# Patient Record
Sex: FEMALE | HISPANIC OR LATINO | Employment: OTHER | URBAN - METROPOLITAN AREA
[De-identification: names, ages, dates, MRNs, and addresses within clinical notes are randomized per-mention and may not be internally consistent; named-entity substitution may affect disease eponyms.]

---

## 2017-03-15 ENCOUNTER — OFFICE VISIT - HEALTHEAST (OUTPATIENT)
Dept: FAMILY MEDICINE | Facility: CLINIC | Age: 82
End: 2017-03-15

## 2017-03-15 DIAGNOSIS — N39.0 URINARY TRACT INFECTION: ICD-10-CM

## 2017-03-15 DIAGNOSIS — S39.012A LUMBAR STRAIN: ICD-10-CM

## 2017-03-15 DIAGNOSIS — R35.0 FREQUENCY OF URINATION: ICD-10-CM

## 2017-03-17 ENCOUNTER — AMBULATORY - HEALTHEAST (OUTPATIENT)
Dept: FAMILY MEDICINE | Facility: CLINIC | Age: 82
End: 2017-03-17

## 2017-03-17 DIAGNOSIS — N39.0 UTI (URINARY TRACT INFECTION): ICD-10-CM

## 2017-04-12 ENCOUNTER — OFFICE VISIT - HEALTHEAST (OUTPATIENT)
Dept: FAMILY MEDICINE | Facility: CLINIC | Age: 82
End: 2017-04-12

## 2017-04-12 DIAGNOSIS — R23.3 PETECHIAL RASH: ICD-10-CM

## 2017-04-19 ENCOUNTER — OFFICE VISIT - HEALTHEAST (OUTPATIENT)
Dept: FAMILY MEDICINE | Facility: CLINIC | Age: 82
End: 2017-04-19

## 2017-04-19 DIAGNOSIS — M54.9 BACK PAIN: ICD-10-CM

## 2017-04-19 DIAGNOSIS — I87.8 VENOUS STASIS: ICD-10-CM

## 2017-04-19 DIAGNOSIS — D64.9 ANEMIA: ICD-10-CM

## 2017-04-19 DIAGNOSIS — I10 ESSENTIAL HYPERTENSION WITH GOAL BLOOD PRESSURE LESS THAN 140/90: ICD-10-CM

## 2017-04-19 ASSESSMENT — MIFFLIN-ST. JEOR: SCORE: 1076.59

## 2017-04-20 ENCOUNTER — COMMUNICATION - HEALTHEAST (OUTPATIENT)
Dept: FAMILY MEDICINE | Facility: CLINIC | Age: 82
End: 2017-04-20

## 2017-09-16 ENCOUNTER — COMMUNICATION - HEALTHEAST (OUTPATIENT)
Dept: FAMILY MEDICINE | Facility: CLINIC | Age: 82
End: 2017-09-16

## 2017-09-16 DIAGNOSIS — I10 HYPERTENSION: ICD-10-CM

## 2017-12-02 ENCOUNTER — COMMUNICATION - HEALTHEAST (OUTPATIENT)
Dept: FAMILY MEDICINE | Facility: CLINIC | Age: 82
End: 2017-12-02

## 2017-12-02 DIAGNOSIS — I10 ESSENTIAL HYPERTENSION: ICD-10-CM

## 2017-12-05 ENCOUNTER — COMMUNICATION - HEALTHEAST (OUTPATIENT)
Dept: FAMILY MEDICINE | Facility: CLINIC | Age: 82
End: 2017-12-05

## 2017-12-21 ENCOUNTER — OFFICE VISIT - HEALTHEAST (OUTPATIENT)
Dept: FAMILY MEDICINE | Facility: CLINIC | Age: 82
End: 2017-12-21

## 2017-12-21 DIAGNOSIS — R35.0 FREQUENCY OF URINATION: ICD-10-CM

## 2017-12-21 DIAGNOSIS — M25.512 SHOULDER PAIN, LEFT: ICD-10-CM

## 2017-12-21 DIAGNOSIS — M25.561 BILATERAL ANTERIOR KNEE PAIN: ICD-10-CM

## 2017-12-21 DIAGNOSIS — M25.562 BILATERAL ANTERIOR KNEE PAIN: ICD-10-CM

## 2017-12-21 DIAGNOSIS — M19.90 OSTEOARTHRITIS: ICD-10-CM

## 2017-12-21 ASSESSMENT — MIFFLIN-ST. JEOR: SCORE: 1062.98

## 2017-12-21 NOTE — ASSESSMENT & PLAN NOTE
Generalized but anterior knee pain worse today, referred for physical therapy.  Also physical therapy for post fracture shoulder pain (2012) left side

## 2017-12-28 ENCOUNTER — COMMUNICATION - HEALTHEAST (OUTPATIENT)
Dept: FAMILY MEDICINE | Facility: CLINIC | Age: 82
End: 2017-12-28

## 2018-01-08 ENCOUNTER — OFFICE VISIT - HEALTHEAST (OUTPATIENT)
Dept: PHYSICAL THERAPY | Facility: REHABILITATION | Age: 83
End: 2018-01-08

## 2018-01-08 DIAGNOSIS — M25.561 BILATERAL CHRONIC KNEE PAIN: ICD-10-CM

## 2018-01-08 DIAGNOSIS — G89.29 BILATERAL CHRONIC KNEE PAIN: ICD-10-CM

## 2018-01-08 DIAGNOSIS — M25.612 SHOULDER STIFFNESS, LEFT: ICD-10-CM

## 2018-01-08 DIAGNOSIS — G89.29 CHRONIC LEFT SHOULDER PAIN: ICD-10-CM

## 2018-01-08 DIAGNOSIS — M25.512 CHRONIC LEFT SHOULDER PAIN: ICD-10-CM

## 2018-01-08 DIAGNOSIS — M25.562 BILATERAL CHRONIC KNEE PAIN: ICD-10-CM

## 2018-01-08 DIAGNOSIS — R53.1 DECREASED STRENGTH: ICD-10-CM

## 2018-01-11 ENCOUNTER — OFFICE VISIT - HEALTHEAST (OUTPATIENT)
Dept: PHYSICAL THERAPY | Facility: REHABILITATION | Age: 83
End: 2018-01-11

## 2018-01-11 DIAGNOSIS — M25.561 BILATERAL CHRONIC KNEE PAIN: ICD-10-CM

## 2018-01-11 DIAGNOSIS — M25.612 SHOULDER STIFFNESS, LEFT: ICD-10-CM

## 2018-01-11 DIAGNOSIS — M25.512 CHRONIC LEFT SHOULDER PAIN: ICD-10-CM

## 2018-01-11 DIAGNOSIS — G89.29 CHRONIC LEFT SHOULDER PAIN: ICD-10-CM

## 2018-01-11 DIAGNOSIS — G89.29 BILATERAL CHRONIC KNEE PAIN: ICD-10-CM

## 2018-01-11 DIAGNOSIS — R53.1 DECREASED STRENGTH: ICD-10-CM

## 2018-01-11 DIAGNOSIS — M25.562 BILATERAL CHRONIC KNEE PAIN: ICD-10-CM

## 2018-01-16 ENCOUNTER — OFFICE VISIT - HEALTHEAST (OUTPATIENT)
Dept: PHYSICAL THERAPY | Facility: REHABILITATION | Age: 83
End: 2018-01-16

## 2018-01-16 DIAGNOSIS — G89.29 BILATERAL CHRONIC KNEE PAIN: ICD-10-CM

## 2018-01-16 DIAGNOSIS — R53.1 DECREASED STRENGTH: ICD-10-CM

## 2018-01-16 DIAGNOSIS — M25.512 CHRONIC LEFT SHOULDER PAIN: ICD-10-CM

## 2018-01-16 DIAGNOSIS — G89.29 CHRONIC LEFT SHOULDER PAIN: ICD-10-CM

## 2018-01-16 DIAGNOSIS — M25.562 BILATERAL CHRONIC KNEE PAIN: ICD-10-CM

## 2018-01-16 DIAGNOSIS — M25.561 BILATERAL CHRONIC KNEE PAIN: ICD-10-CM

## 2018-01-16 DIAGNOSIS — M25.612 SHOULDER STIFFNESS, LEFT: ICD-10-CM

## 2018-01-18 ENCOUNTER — OFFICE VISIT - HEALTHEAST (OUTPATIENT)
Dept: PHYSICAL THERAPY | Facility: REHABILITATION | Age: 83
End: 2018-01-18

## 2018-01-18 DIAGNOSIS — G89.29 CHRONIC LEFT SHOULDER PAIN: ICD-10-CM

## 2018-01-18 DIAGNOSIS — M25.612 SHOULDER STIFFNESS, LEFT: ICD-10-CM

## 2018-01-18 DIAGNOSIS — M25.561 BILATERAL CHRONIC KNEE PAIN: ICD-10-CM

## 2018-01-18 DIAGNOSIS — R53.1 DECREASED STRENGTH: ICD-10-CM

## 2018-01-18 DIAGNOSIS — G89.29 BILATERAL CHRONIC KNEE PAIN: ICD-10-CM

## 2018-01-18 DIAGNOSIS — M25.562 BILATERAL CHRONIC KNEE PAIN: ICD-10-CM

## 2018-01-18 DIAGNOSIS — M25.512 CHRONIC LEFT SHOULDER PAIN: ICD-10-CM

## 2018-01-22 ENCOUNTER — OFFICE VISIT - HEALTHEAST (OUTPATIENT)
Dept: PHYSICAL THERAPY | Facility: REHABILITATION | Age: 83
End: 2018-01-22

## 2018-01-22 DIAGNOSIS — M25.512 CHRONIC LEFT SHOULDER PAIN: ICD-10-CM

## 2018-01-22 DIAGNOSIS — M25.561 BILATERAL CHRONIC KNEE PAIN: ICD-10-CM

## 2018-01-22 DIAGNOSIS — G89.29 CHRONIC LEFT SHOULDER PAIN: ICD-10-CM

## 2018-01-22 DIAGNOSIS — R53.1 DECREASED STRENGTH: ICD-10-CM

## 2018-01-22 DIAGNOSIS — M25.562 BILATERAL CHRONIC KNEE PAIN: ICD-10-CM

## 2018-01-22 DIAGNOSIS — M25.612 SHOULDER STIFFNESS, LEFT: ICD-10-CM

## 2018-01-22 DIAGNOSIS — G89.29 BILATERAL CHRONIC KNEE PAIN: ICD-10-CM

## 2018-01-25 ENCOUNTER — OFFICE VISIT - HEALTHEAST (OUTPATIENT)
Dept: PHYSICAL THERAPY | Facility: REHABILITATION | Age: 83
End: 2018-01-25

## 2018-01-25 DIAGNOSIS — M25.562 BILATERAL CHRONIC KNEE PAIN: ICD-10-CM

## 2018-01-25 DIAGNOSIS — M25.512 CHRONIC LEFT SHOULDER PAIN: ICD-10-CM

## 2018-01-25 DIAGNOSIS — G89.29 BILATERAL CHRONIC KNEE PAIN: ICD-10-CM

## 2018-01-25 DIAGNOSIS — G89.29 CHRONIC LEFT SHOULDER PAIN: ICD-10-CM

## 2018-01-25 DIAGNOSIS — M25.561 BILATERAL CHRONIC KNEE PAIN: ICD-10-CM

## 2018-01-25 DIAGNOSIS — M25.612 SHOULDER STIFFNESS, LEFT: ICD-10-CM

## 2018-01-29 ENCOUNTER — OFFICE VISIT - HEALTHEAST (OUTPATIENT)
Dept: PHYSICAL THERAPY | Facility: REHABILITATION | Age: 83
End: 2018-01-29

## 2018-01-29 DIAGNOSIS — M25.512 CHRONIC LEFT SHOULDER PAIN: ICD-10-CM

## 2018-01-29 DIAGNOSIS — M25.562 BILATERAL CHRONIC KNEE PAIN: ICD-10-CM

## 2018-01-29 DIAGNOSIS — R53.1 DECREASED STRENGTH: ICD-10-CM

## 2018-01-29 DIAGNOSIS — M25.561 BILATERAL CHRONIC KNEE PAIN: ICD-10-CM

## 2018-01-29 DIAGNOSIS — G89.29 BILATERAL CHRONIC KNEE PAIN: ICD-10-CM

## 2018-01-29 DIAGNOSIS — G89.29 CHRONIC LEFT SHOULDER PAIN: ICD-10-CM

## 2018-01-29 DIAGNOSIS — M25.612 SHOULDER STIFFNESS, LEFT: ICD-10-CM

## 2018-02-05 ENCOUNTER — OFFICE VISIT - HEALTHEAST (OUTPATIENT)
Dept: PHYSICAL THERAPY | Facility: REHABILITATION | Age: 83
End: 2018-02-05

## 2018-02-05 DIAGNOSIS — M25.512 CHRONIC LEFT SHOULDER PAIN: ICD-10-CM

## 2018-02-05 DIAGNOSIS — G89.29 CHRONIC LEFT SHOULDER PAIN: ICD-10-CM

## 2018-02-05 DIAGNOSIS — M25.612 SHOULDER STIFFNESS, LEFT: ICD-10-CM

## 2018-02-05 DIAGNOSIS — M25.562 BILATERAL CHRONIC KNEE PAIN: ICD-10-CM

## 2018-02-05 DIAGNOSIS — M25.561 BILATERAL CHRONIC KNEE PAIN: ICD-10-CM

## 2018-02-05 DIAGNOSIS — R53.1 DECREASED STRENGTH: ICD-10-CM

## 2018-02-05 DIAGNOSIS — G89.29 BILATERAL CHRONIC KNEE PAIN: ICD-10-CM

## 2018-02-12 ENCOUNTER — OFFICE VISIT - HEALTHEAST (OUTPATIENT)
Dept: PHYSICAL THERAPY | Facility: REHABILITATION | Age: 83
End: 2018-02-12

## 2018-02-12 DIAGNOSIS — M25.512 CHRONIC LEFT SHOULDER PAIN: ICD-10-CM

## 2018-02-12 DIAGNOSIS — R53.1 DECREASED STRENGTH: ICD-10-CM

## 2018-02-12 DIAGNOSIS — M25.561 BILATERAL CHRONIC KNEE PAIN: ICD-10-CM

## 2018-02-12 DIAGNOSIS — G89.29 BILATERAL CHRONIC KNEE PAIN: ICD-10-CM

## 2018-02-12 DIAGNOSIS — M25.562 BILATERAL CHRONIC KNEE PAIN: ICD-10-CM

## 2018-02-12 DIAGNOSIS — G89.29 CHRONIC LEFT SHOULDER PAIN: ICD-10-CM

## 2018-02-12 DIAGNOSIS — M25.612 SHOULDER STIFFNESS, LEFT: ICD-10-CM

## 2018-03-07 ENCOUNTER — COMMUNICATION - HEALTHEAST (OUTPATIENT)
Dept: FAMILY MEDICINE | Facility: CLINIC | Age: 83
End: 2018-03-07

## 2018-03-07 DIAGNOSIS — I10 ESSENTIAL HYPERTENSION: ICD-10-CM

## 2018-03-16 ENCOUNTER — COMMUNICATION - HEALTHEAST (OUTPATIENT)
Dept: FAMILY MEDICINE | Facility: CLINIC | Age: 83
End: 2018-03-16

## 2018-03-16 DIAGNOSIS — I10 ESSENTIAL HYPERTENSION: ICD-10-CM

## 2018-08-08 ENCOUNTER — COMMUNICATION - HEALTHEAST (OUTPATIENT)
Dept: FAMILY MEDICINE | Facility: CLINIC | Age: 83
End: 2018-08-08

## 2018-08-08 DIAGNOSIS — I10 HYPERTENSION: ICD-10-CM

## 2018-08-10 ENCOUNTER — COMMUNICATION - HEALTHEAST (OUTPATIENT)
Dept: FAMILY MEDICINE | Facility: CLINIC | Age: 83
End: 2018-08-10

## 2018-08-10 ENCOUNTER — AMBULATORY - HEALTHEAST (OUTPATIENT)
Dept: FAMILY MEDICINE | Facility: CLINIC | Age: 83
End: 2018-08-10

## 2018-08-10 DIAGNOSIS — I10 ESSENTIAL HYPERTENSION: ICD-10-CM

## 2018-09-14 ENCOUNTER — COMMUNICATION - HEALTHEAST (OUTPATIENT)
Dept: FAMILY MEDICINE | Facility: CLINIC | Age: 83
End: 2018-09-14

## 2018-09-14 DIAGNOSIS — I10 ESSENTIAL HYPERTENSION: ICD-10-CM

## 2018-09-28 ENCOUNTER — COMMUNICATION - HEALTHEAST (OUTPATIENT)
Dept: SCHEDULING | Facility: CLINIC | Age: 83
End: 2018-09-28

## 2018-10-02 ENCOUNTER — OFFICE VISIT - HEALTHEAST (OUTPATIENT)
Dept: FAMILY MEDICINE | Facility: CLINIC | Age: 83
End: 2018-10-02

## 2018-10-02 DIAGNOSIS — Z00.00 HEALTHCARE MAINTENANCE: ICD-10-CM

## 2018-10-02 DIAGNOSIS — I10 ESSENTIAL HYPERTENSION: ICD-10-CM

## 2018-10-02 DIAGNOSIS — M17.12 PRIMARY OSTEOARTHRITIS OF LEFT KNEE: ICD-10-CM

## 2018-10-02 LAB
ANION GAP SERPL CALCULATED.3IONS-SCNC: 11 MMOL/L (ref 5–18)
BUN SERPL-MCNC: 28 MG/DL (ref 8–28)
CALCIUM SERPL-MCNC: 9.2 MG/DL (ref 8.5–10.5)
CHLORIDE BLD-SCNC: 103 MMOL/L (ref 98–107)
CO2 SERPL-SCNC: 26 MMOL/L (ref 22–31)
CREAT SERPL-MCNC: 1.24 MG/DL (ref 0.6–1.1)
GFR SERPL CREATININE-BSD FRML MDRD: 41 ML/MIN/1.73M2
GLUCOSE BLD-MCNC: 108 MG/DL (ref 70–125)
HGB BLD-MCNC: 12.3 G/DL (ref 12–16)
POTASSIUM BLD-SCNC: 3.8 MMOL/L (ref 3.5–5)
SODIUM SERPL-SCNC: 140 MMOL/L (ref 136–145)

## 2018-10-02 NOTE — ASSESSMENT & PLAN NOTE
Sudden increase in symptoms, left knee, likely due to degenerative tear of medial meniscus-noobstructive symptoms  Patient requested knee injection, using Tylenol    Knee Arthrocentesis  Injection Procedure Note    Diagnosis: left,  Knee osteoarthritis    Post-operative Diagnosis: same    Indications: pain      Procedure Details   Verbal consent was obtained for procedure.  Area was prepped with Betadine sterilely.  Landmarks were palpated and 27-gauge 1-1/4 needle advanced from medial approach under the patella into the joint space.  Medications then injected.  Needle removed.      Complications:  None; patient tolerated the procedure well.

## 2018-10-02 NOTE — ASSESSMENT & PLAN NOTE
Hypertension is overly well controlled-diastolic hypotension  BP Readings from Last 3 Encounters:   10/02/18 116/42   12/21/17 112/52   04/19/17 122/56     Comment: No symptoms of presyncope, somewhat bradycardic today  Current antihypertensives bisoprolol 1 mg, Hyzaar 100/25 and amlodipine 10 mg  Plan: Stop bisoprolol, follow-up 1 month, check BMP

## 2018-10-04 ENCOUNTER — COMMUNICATION - HEALTHEAST (OUTPATIENT)
Dept: FAMILY MEDICINE | Facility: CLINIC | Age: 83
End: 2018-10-04

## 2018-10-09 ENCOUNTER — OFFICE VISIT - HEALTHEAST (OUTPATIENT)
Dept: FAMILY MEDICINE | Facility: CLINIC | Age: 83
End: 2018-10-09

## 2018-10-09 DIAGNOSIS — I10 ESSENTIAL HYPERTENSION: ICD-10-CM

## 2018-10-09 DIAGNOSIS — M70.50 PES ANSERINE BURSITIS: ICD-10-CM

## 2018-10-09 NOTE — ASSESSMENT & PLAN NOTE
Hypertension is well controlled  BP Readings from Last 3 Encounters:   10/09/18    10/02/18 116/42   12/21/17 112/52     Comment: Stopped bisoprolol last visit  Current antihypertensives amlodipine 10, losartan/hydrochlorothiazide 100/25  Plan: No change  Follow-up: 6 months

## 2018-10-16 ENCOUNTER — OFFICE VISIT - HEALTHEAST (OUTPATIENT)
Dept: FAMILY MEDICINE | Facility: CLINIC | Age: 83
End: 2018-10-16

## 2018-10-16 DIAGNOSIS — M70.50 PES ANSERINE BURSITIS: ICD-10-CM

## 2018-10-16 NOTE — ASSESSMENT & PLAN NOTE
Superimposed on osteoarthritis of the left knee.  Pretty clear that she has distinct symptoms from her osteoarthritis    Discussed option of injection.  Discussed uncertainty, especially with knee effusion, of my diagnosis      Past anserine bursa injection left knee  Discussed risks and benefits of injection with patient including nerve and artery injury as well as infection  Patient agreed to risks/requested injection  Sterilely prepped wrist with alcohol  Located landmarks and palpated for area of maximal tenderness on medial knee  Using 27-gauge 1-1/4 needle injected 1 cc of lidocaine with 1/2 cc of 40 mg Depo-Medrol  Patient tolerated this well.    She had immediate relief of pain following this injection

## 2018-10-22 ENCOUNTER — RECORDS - HEALTHEAST (OUTPATIENT)
Dept: GENERAL RADIOLOGY | Facility: CLINIC | Age: 83
End: 2018-10-22

## 2018-10-22 ENCOUNTER — OFFICE VISIT - HEALTHEAST (OUTPATIENT)
Dept: FAMILY MEDICINE | Facility: CLINIC | Age: 83
End: 2018-10-22

## 2018-10-22 DIAGNOSIS — M25.562 PAIN IN LEFT KNEE: ICD-10-CM

## 2018-10-22 DIAGNOSIS — M25.562 ACUTE PAIN OF LEFT KNEE: ICD-10-CM

## 2018-10-22 DIAGNOSIS — G89.29 OTHER CHRONIC PAIN: ICD-10-CM

## 2018-10-31 ENCOUNTER — OFFICE VISIT - HEALTHEAST (OUTPATIENT)
Dept: PHYSICAL THERAPY | Facility: REHABILITATION | Age: 83
End: 2018-10-31

## 2018-10-31 DIAGNOSIS — R26.9 ABNORMALITY OF GAIT: ICD-10-CM

## 2018-10-31 DIAGNOSIS — M25.562 LEFT MEDIAL KNEE PAIN: ICD-10-CM

## 2018-11-06 ENCOUNTER — OFFICE VISIT - HEALTHEAST (OUTPATIENT)
Dept: PHYSICAL THERAPY | Facility: REHABILITATION | Age: 83
End: 2018-11-06

## 2018-11-06 DIAGNOSIS — R26.9 ABNORMALITY OF GAIT: ICD-10-CM

## 2018-11-06 DIAGNOSIS — M25.562 LEFT MEDIAL KNEE PAIN: ICD-10-CM

## 2018-11-13 ENCOUNTER — OFFICE VISIT - HEALTHEAST (OUTPATIENT)
Dept: PHYSICAL THERAPY | Facility: REHABILITATION | Age: 83
End: 2018-11-13

## 2018-11-13 DIAGNOSIS — R26.9 ABNORMALITY OF GAIT: ICD-10-CM

## 2018-11-13 DIAGNOSIS — M25.562 LEFT MEDIAL KNEE PAIN: ICD-10-CM

## 2018-11-20 ENCOUNTER — OFFICE VISIT - HEALTHEAST (OUTPATIENT)
Dept: PHYSICAL THERAPY | Facility: REHABILITATION | Age: 83
End: 2018-11-20

## 2018-11-20 DIAGNOSIS — M25.562 LEFT MEDIAL KNEE PAIN: ICD-10-CM

## 2018-11-20 DIAGNOSIS — R26.9 ABNORMALITY OF GAIT: ICD-10-CM

## 2018-11-27 ENCOUNTER — OFFICE VISIT - HEALTHEAST (OUTPATIENT)
Dept: PHYSICAL THERAPY | Facility: REHABILITATION | Age: 83
End: 2018-11-27

## 2018-11-27 DIAGNOSIS — R26.9 ABNORMALITY OF GAIT: ICD-10-CM

## 2018-11-27 DIAGNOSIS — M25.562 LEFT MEDIAL KNEE PAIN: ICD-10-CM

## 2019-02-28 ENCOUNTER — COMMUNICATION - HEALTHEAST (OUTPATIENT)
Dept: FAMILY MEDICINE | Facility: CLINIC | Age: 84
End: 2019-02-28

## 2019-02-28 DIAGNOSIS — I10 ESSENTIAL HYPERTENSION: ICD-10-CM

## 2019-05-30 ENCOUNTER — COMMUNICATION - HEALTHEAST (OUTPATIENT)
Dept: FAMILY MEDICINE | Facility: CLINIC | Age: 84
End: 2019-05-30

## 2019-05-30 DIAGNOSIS — I10 ESSENTIAL HYPERTENSION: ICD-10-CM

## 2019-08-29 ENCOUNTER — COMMUNICATION - HEALTHEAST (OUTPATIENT)
Dept: FAMILY MEDICINE | Facility: CLINIC | Age: 84
End: 2019-08-29

## 2019-08-29 DIAGNOSIS — I10 ESSENTIAL HYPERTENSION: ICD-10-CM

## 2019-11-27 ENCOUNTER — COMMUNICATION - HEALTHEAST (OUTPATIENT)
Dept: FAMILY MEDICINE | Facility: CLINIC | Age: 84
End: 2019-11-27

## 2019-11-27 DIAGNOSIS — I10 ESSENTIAL HYPERTENSION: ICD-10-CM

## 2019-11-29 ENCOUNTER — COMMUNICATION - HEALTHEAST (OUTPATIENT)
Dept: SCHEDULING | Facility: CLINIC | Age: 84
End: 2019-11-29

## 2019-11-29 DIAGNOSIS — I10 ESSENTIAL HYPERTENSION: ICD-10-CM

## 2020-02-11 ENCOUNTER — OFFICE VISIT - HEALTHEAST (OUTPATIENT)
Dept: FAMILY MEDICINE | Facility: CLINIC | Age: 85
End: 2020-02-11

## 2020-02-11 DIAGNOSIS — R22.32 NODULE OF FINGER OF LEFT HAND: ICD-10-CM

## 2020-02-11 ASSESSMENT — MIFFLIN-ST. JEOR: SCORE: 1027.83

## 2020-02-11 NOTE — ASSESSMENT & PLAN NOTE
Volar aspect, seems to pop out just proximal to the PIP joint.  Came up overnight  Unclear what this is  No warmth, redness to suggest infection but with pain this is clearly on the differential  Synovial cyst also distinct possibility but it seemed to arise very quickly for this problem and does not transilluminate  Hematoma or other fluid collection possibility as well    Discussed option of referral to hand surgery/ultrasound/empiric treatment for infection and follow    Decided on the latter, will soak, splint at night, elevate, cephalexin, follow-up if not improving.  Discussed to contact us promptly should things worsen.

## 2020-02-28 ENCOUNTER — COMMUNICATION - HEALTHEAST (OUTPATIENT)
Dept: FAMILY MEDICINE | Facility: CLINIC | Age: 85
End: 2020-02-28

## 2020-02-28 ENCOUNTER — AMBULATORY - HEALTHEAST (OUTPATIENT)
Dept: FAMILY MEDICINE | Facility: CLINIC | Age: 85
End: 2020-02-28

## 2020-02-28 DIAGNOSIS — R22.32 NODULE OF FINGER OF LEFT HAND: ICD-10-CM

## 2020-02-28 DIAGNOSIS — I10 ESSENTIAL HYPERTENSION: ICD-10-CM

## 2020-03-02 ENCOUNTER — RECORDS - HEALTHEAST (OUTPATIENT)
Dept: ADMINISTRATIVE | Facility: OTHER | Age: 85
End: 2020-03-02

## 2020-03-06 ENCOUNTER — OFFICE VISIT - HEALTHEAST (OUTPATIENT)
Dept: FAMILY MEDICINE | Facility: CLINIC | Age: 85
End: 2020-03-06

## 2020-03-06 DIAGNOSIS — R22.32 NODULE OF FINGER OF LEFT HAND: ICD-10-CM

## 2020-03-06 DIAGNOSIS — Z01.818 PREOP EXAMINATION: ICD-10-CM

## 2020-03-06 LAB
HGB BLD-MCNC: 12.3 G/DL (ref 12–16)
POTASSIUM BLD-SCNC: 3.6 MMOL/L (ref 3.5–5)

## 2020-03-06 ASSESSMENT — MIFFLIN-ST. JEOR: SCORE: 1013.93

## 2020-03-09 LAB
ATRIAL RATE - MUSE: 57 BPM
DIASTOLIC BLOOD PRESSURE - MUSE: NORMAL
INTERPRETATION ECG - MUSE: NORMAL
P AXIS - MUSE: 47 DEGREES
PR INTERVAL - MUSE: 166 MS
QRS DURATION - MUSE: 84 MS
QT - MUSE: 454 MS
QTC - MUSE: 441 MS
R AXIS - MUSE: -6 DEGREES
SYSTOLIC BLOOD PRESSURE - MUSE: NORMAL
T AXIS - MUSE: 36 DEGREES
VENTRICULAR RATE- MUSE: 57 BPM

## 2020-03-12 ENCOUNTER — RECORDS - HEALTHEAST (OUTPATIENT)
Dept: ADMINISTRATIVE | Facility: OTHER | Age: 85
End: 2020-03-12

## 2020-03-17 ENCOUNTER — RECORDS - HEALTHEAST (OUTPATIENT)
Dept: ADMINISTRATIVE | Facility: OTHER | Age: 85
End: 2020-03-17

## 2020-03-17 LAB
LAB AP CHARGES (HE HISTORICAL CONVERSION): NORMAL
PATH REPORT.COMMENTS IMP SPEC: NORMAL
PATH REPORT.COMMENTS IMP SPEC: NORMAL
PATH REPORT.FINAL DX SPEC: NORMAL
PATH REPORT.GROSS SPEC: NORMAL
PATH REPORT.MICROSCOPIC SPEC OTHER STN: NORMAL
PATH REPORT.MICROSCOPIC SPEC OTHER STN: NORMAL
PATH REPORT.RELEVANT HX SPEC: NORMAL
RESULT FLAG (HE HISTORICAL CONVERSION): NORMAL

## 2020-03-23 ENCOUNTER — RECORDS - HEALTHEAST (OUTPATIENT)
Dept: ADMINISTRATIVE | Facility: OTHER | Age: 85
End: 2020-03-23

## 2020-03-26 ENCOUNTER — COMMUNICATION - HEALTHEAST (OUTPATIENT)
Dept: FAMILY MEDICINE | Facility: CLINIC | Age: 85
End: 2020-03-26

## 2020-03-26 DIAGNOSIS — I10 ESSENTIAL HYPERTENSION: ICD-10-CM

## 2020-08-12 ENCOUNTER — COMMUNICATION - HEALTHEAST (OUTPATIENT)
Dept: FAMILY MEDICINE | Facility: CLINIC | Age: 85
End: 2020-08-12

## 2020-09-03 ENCOUNTER — OFFICE VISIT - HEALTHEAST (OUTPATIENT)
Dept: FAMILY MEDICINE | Facility: CLINIC | Age: 85
End: 2020-09-03

## 2020-09-03 DIAGNOSIS — Z23 ENCOUNTER FOR IMMUNIZATION: ICD-10-CM

## 2020-09-03 DIAGNOSIS — Z78.0 ASYMPTOMATIC MENOPAUSAL STATE: ICD-10-CM

## 2020-09-03 DIAGNOSIS — I10 ESSENTIAL HYPERTENSION: ICD-10-CM

## 2020-09-03 DIAGNOSIS — F43.21 GRIEF REACTION: ICD-10-CM

## 2020-09-03 DIAGNOSIS — Z00.00 HEALTHCARE MAINTENANCE: ICD-10-CM

## 2020-09-03 LAB
ANION GAP SERPL CALCULATED.3IONS-SCNC: 9 MMOL/L (ref 5–18)
BUN SERPL-MCNC: 17 MG/DL (ref 8–28)
CALCIUM SERPL-MCNC: 9.4 MG/DL (ref 8.5–10.5)
CHLORIDE BLD-SCNC: 104 MMOL/L (ref 98–107)
CO2 SERPL-SCNC: 25 MMOL/L (ref 22–31)
CREAT SERPL-MCNC: 0.84 MG/DL (ref 0.6–1.1)
GFR SERPL CREATININE-BSD FRML MDRD: >60 ML/MIN/1.73M2
GLUCOSE BLD-MCNC: 95 MG/DL (ref 70–125)
HGB BLD-MCNC: 12.4 G/DL (ref 12–16)
POTASSIUM BLD-SCNC: 3.4 MMOL/L (ref 3.5–5)
SODIUM SERPL-SCNC: 138 MMOL/L (ref 136–145)

## 2020-09-03 ASSESSMENT — PATIENT HEALTH QUESTIONNAIRE - PHQ9: SUM OF ALL RESPONSES TO PHQ QUESTIONS 1-9: 2

## 2020-09-03 ASSESSMENT — MIFFLIN-ST. JEOR: SCORE: 996.07

## 2020-09-03 NOTE — ASSESSMENT & PLAN NOTE
Amlodipine 10, losartan/hydrochlorthiazide 100/25, blood pressure well controlled.  No change, check BMP

## 2020-09-03 NOTE — ASSESSMENT & PLAN NOTE
3 weeks ago, helped place her  in a care facility for dementia since things were not manageable at home.  She is now very sad and lonely.  Tearful, not sleeping at night well, difficulty enjoying things during the day, not suicidal.    She has numerous social outlets, largely through her [employer], however she has not been reaching out to them.    Also, she has a son whois in town who she sees regularly but he is going to be going to [place] in October    Recommend therapist, referral made  Recommend continuing social activities even if she does not feel like it  Recommend physical activity    I was asked about possibility medication.  Decided against this at this early stage, this is normal grief response.  If problems persist or worsen it for the next 6 weeks, recommend that they follow-up.

## 2020-09-08 ENCOUNTER — COMMUNICATION - HEALTHEAST (OUTPATIENT)
Dept: NURSING | Facility: CLINIC | Age: 85
End: 2020-09-08

## 2020-09-08 ENCOUNTER — COMMUNICATION - HEALTHEAST (OUTPATIENT)
Dept: FAMILY MEDICINE | Facility: CLINIC | Age: 85
End: 2020-09-08

## 2020-09-08 ENCOUNTER — COMMUNICATION - HEALTHEAST (OUTPATIENT)
Dept: CARE COORDINATION | Facility: CLINIC | Age: 85
End: 2020-09-08

## 2020-09-08 ASSESSMENT — ACTIVITIES OF DAILY LIVING (ADL): DEPENDENT_IADLS:: INDEPENDENT

## 2020-09-11 ENCOUNTER — AMBULATORY - HEALTHEAST (OUTPATIENT)
Dept: CARE COORDINATION | Facility: CLINIC | Age: 85
End: 2020-09-11

## 2020-09-11 DIAGNOSIS — Z65.9 PSYCHOSOCIAL PROBLEM: ICD-10-CM

## 2020-09-22 ENCOUNTER — OFFICE VISIT - HEALTHEAST (OUTPATIENT)
Dept: BEHAVIORAL HEALTH | Facility: CLINIC | Age: 85
End: 2020-09-22

## 2020-09-22 DIAGNOSIS — F43.21 GRIEF REACTION: ICD-10-CM

## 2020-09-22 ASSESSMENT — ANXIETY QUESTIONNAIRES
7. FEELING AFRAID AS IF SOMETHING AWFUL MIGHT HAPPEN: NOT AT ALL
5. BEING SO RESTLESS THAT IT IS HARD TO SIT STILL: NOT AT ALL
GAD7 TOTAL SCORE: 7
2. NOT BEING ABLE TO STOP OR CONTROL WORRYING: NEARLY EVERY DAY
3. WORRYING TOO MUCH ABOUT DIFFERENT THINGS: NEARLY EVERY DAY
6. BECOMING EASILY ANNOYED OR IRRITABLE: NOT AT ALL
1. FEELING NERVOUS, ANXIOUS, OR ON EDGE: SEVERAL DAYS
IF YOU CHECKED OFF ANY PROBLEMS ON THIS QUESTIONNAIRE, HOW DIFFICULT HAVE THESE PROBLEMS MADE IT FOR YOU TO DO YOUR WORK, TAKE CARE OF THINGS AT HOME, OR GET ALONG WITH OTHER PEOPLE: SOMEWHAT DIFFICULT
4. TROUBLE RELAXING: NOT AT ALL

## 2020-09-22 ASSESSMENT — PATIENT HEALTH QUESTIONNAIRE - PHQ9: SUM OF ALL RESPONSES TO PHQ QUESTIONS 1-9: 1

## 2020-09-23 ENCOUNTER — COMMUNICATION - HEALTHEAST (OUTPATIENT)
Dept: NURSING | Facility: CLINIC | Age: 85
End: 2020-09-23

## 2020-09-25 ENCOUNTER — OFFICE VISIT - HEALTHEAST (OUTPATIENT)
Dept: FAMILY MEDICINE | Facility: CLINIC | Age: 85
End: 2020-09-25

## 2020-09-25 DIAGNOSIS — F43.21 ADJUSTMENT DISORDER WITH DEPRESSED MOOD: ICD-10-CM

## 2020-09-25 ASSESSMENT — MIFFLIN-ST. JEOR: SCORE: 991.54

## 2020-09-25 NOTE — ASSESSMENT & PLAN NOTE
placed in memory care facility 2 months ago, now lonely and sad.  Seeing therapist, staying active  Problems getting somewhat better, 2 nights per week, wakes up for 3 hours in the middle of the night and cannot sleep, otherwise better.    Decided against medication at this point, continue with therapist.  Follow-up to discuss 6 weeks

## 2020-10-01 ENCOUNTER — OFFICE VISIT - HEALTHEAST (OUTPATIENT)
Dept: BEHAVIORAL HEALTH | Facility: HOSPITAL | Age: 85
End: 2020-10-01

## 2020-10-01 DIAGNOSIS — F43.21 GRIEF REACTION: ICD-10-CM

## 2020-10-01 ASSESSMENT — ANXIETY QUESTIONNAIRES
3. WORRYING TOO MUCH ABOUT DIFFERENT THINGS: MORE THAN HALF THE DAYS
4. TROUBLE RELAXING: NOT AT ALL
1. FEELING NERVOUS, ANXIOUS, OR ON EDGE: NOT AT ALL
6. BECOMING EASILY ANNOYED OR IRRITABLE: MORE THAN HALF THE DAYS
7. FEELING AFRAID AS IF SOMETHING AWFUL MIGHT HAPPEN: MORE THAN HALF THE DAYS
5. BEING SO RESTLESS THAT IT IS HARD TO SIT STILL: MORE THAN HALF THE DAYS
IF YOU CHECKED OFF ANY PROBLEMS ON THIS QUESTIONNAIRE, HOW DIFFICULT HAVE THESE PROBLEMS MADE IT FOR YOU TO DO YOUR WORK, TAKE CARE OF THINGS AT HOME, OR GET ALONG WITH OTHER PEOPLE: SOMEWHAT DIFFICULT
2. NOT BEING ABLE TO STOP OR CONTROL WORRYING: MORE THAN HALF THE DAYS
GAD7 TOTAL SCORE: 10

## 2020-10-01 ASSESSMENT — PATIENT HEALTH QUESTIONNAIRE - PHQ9: SUM OF ALL RESPONSES TO PHQ QUESTIONS 1-9: 2

## 2020-10-07 ENCOUNTER — COMMUNICATION - HEALTHEAST (OUTPATIENT)
Dept: NURSING | Facility: CLINIC | Age: 85
End: 2020-10-07

## 2020-10-08 ENCOUNTER — OFFICE VISIT - HEALTHEAST (OUTPATIENT)
Dept: BEHAVIORAL HEALTH | Facility: HOSPITAL | Age: 85
End: 2020-10-08

## 2020-10-08 DIAGNOSIS — F43.22 ADJUSTMENT DISORDER WITH ANXIETY: ICD-10-CM

## 2020-10-08 DIAGNOSIS — F43.21 GRIEF REACTION: ICD-10-CM

## 2020-10-08 ASSESSMENT — ANXIETY QUESTIONNAIRES
7. FEELING AFRAID AS IF SOMETHING AWFUL MIGHT HAPPEN: NEARLY EVERY DAY
IF YOU CHECKED OFF ANY PROBLEMS ON THIS QUESTIONNAIRE, HOW DIFFICULT HAVE THESE PROBLEMS MADE IT FOR YOU TO DO YOUR WORK, TAKE CARE OF THINGS AT HOME, OR GET ALONG WITH OTHER PEOPLE: SOMEWHAT DIFFICULT
GAD7 TOTAL SCORE: 9
5. BEING SO RESTLESS THAT IT IS HARD TO SIT STILL: NOT AT ALL
2. NOT BEING ABLE TO STOP OR CONTROL WORRYING: NEARLY EVERY DAY
4. TROUBLE RELAXING: NOT AT ALL
6. BECOMING EASILY ANNOYED OR IRRITABLE: NOT AT ALL
1. FEELING NERVOUS, ANXIOUS, OR ON EDGE: NOT AT ALL
3. WORRYING TOO MUCH ABOUT DIFFERENT THINGS: NEARLY EVERY DAY

## 2020-10-08 ASSESSMENT — PATIENT HEALTH QUESTIONNAIRE - PHQ9: SUM OF ALL RESPONSES TO PHQ QUESTIONS 1-9: 0

## 2020-10-16 ENCOUNTER — COMMUNICATION - HEALTHEAST (OUTPATIENT)
Dept: CARE COORDINATION | Facility: CLINIC | Age: 85
End: 2020-10-16

## 2020-10-28 ENCOUNTER — OFFICE VISIT - HEALTHEAST (OUTPATIENT)
Dept: BEHAVIORAL HEALTH | Facility: HOSPITAL | Age: 85
End: 2020-10-28

## 2020-10-28 ENCOUNTER — AMBULATORY - HEALTHEAST (OUTPATIENT)
Dept: BEHAVIORAL HEALTH | Facility: HOSPITAL | Age: 85
End: 2020-10-28

## 2020-10-28 DIAGNOSIS — F43.22 ADJUSTMENT DISORDER WITH ANXIETY: ICD-10-CM

## 2020-10-28 DIAGNOSIS — F43.21 GRIEF REACTION: ICD-10-CM

## 2020-10-28 ASSESSMENT — ANXIETY QUESTIONNAIRES
6. BECOMING EASILY ANNOYED OR IRRITABLE: NEARLY EVERY DAY
4. TROUBLE RELAXING: NEARLY EVERY DAY
GAD7 TOTAL SCORE: 20
3. WORRYING TOO MUCH ABOUT DIFFERENT THINGS: NEARLY EVERY DAY
1. FEELING NERVOUS, ANXIOUS, OR ON EDGE: MORE THAN HALF THE DAYS
7. FEELING AFRAID AS IF SOMETHING AWFUL MIGHT HAPPEN: NEARLY EVERY DAY
IF YOU CHECKED OFF ANY PROBLEMS ON THIS QUESTIONNAIRE, HOW DIFFICULT HAVE THESE PROBLEMS MADE IT FOR YOU TO DO YOUR WORK, TAKE CARE OF THINGS AT HOME, OR GET ALONG WITH OTHER PEOPLE: EXTREMELY DIFFICULT
2. NOT BEING ABLE TO STOP OR CONTROL WORRYING: NEARLY EVERY DAY
5. BEING SO RESTLESS THAT IT IS HARD TO SIT STILL: NEARLY EVERY DAY

## 2020-10-28 ASSESSMENT — PATIENT HEALTH QUESTIONNAIRE - PHQ9: SUM OF ALL RESPONSES TO PHQ QUESTIONS 1-9: 11

## 2020-11-04 ENCOUNTER — OFFICE VISIT - HEALTHEAST (OUTPATIENT)
Dept: BEHAVIORAL HEALTH | Facility: HOSPITAL | Age: 85
End: 2020-11-04

## 2020-11-04 DIAGNOSIS — F43.22 ADJUSTMENT DISORDER WITH ANXIETY: ICD-10-CM

## 2020-11-04 DIAGNOSIS — F43.21 GRIEF REACTION: ICD-10-CM

## 2020-11-04 ASSESSMENT — ANXIETY QUESTIONNAIRES
GAD7 TOTAL SCORE: 3
2. NOT BEING ABLE TO STOP OR CONTROL WORRYING: SEVERAL DAYS
4. TROUBLE RELAXING: NOT AT ALL
7. FEELING AFRAID AS IF SOMETHING AWFUL MIGHT HAPPEN: NOT AT ALL
3. WORRYING TOO MUCH ABOUT DIFFERENT THINGS: SEVERAL DAYS
IF YOU CHECKED OFF ANY PROBLEMS ON THIS QUESTIONNAIRE, HOW DIFFICULT HAVE THESE PROBLEMS MADE IT FOR YOU TO DO YOUR WORK, TAKE CARE OF THINGS AT HOME, OR GET ALONG WITH OTHER PEOPLE: SOMEWHAT DIFFICULT
5. BEING SO RESTLESS THAT IT IS HARD TO SIT STILL: NOT AT ALL
6. BECOMING EASILY ANNOYED OR IRRITABLE: NOT AT ALL
1. FEELING NERVOUS, ANXIOUS, OR ON EDGE: SEVERAL DAYS

## 2020-11-04 ASSESSMENT — PATIENT HEALTH QUESTIONNAIRE - PHQ9: SUM OF ALL RESPONSES TO PHQ QUESTIONS 1-9: 0

## 2020-11-10 ENCOUNTER — COMMUNICATION - HEALTHEAST (OUTPATIENT)
Dept: NURSING | Facility: CLINIC | Age: 85
End: 2020-11-10

## 2020-11-18 ENCOUNTER — OFFICE VISIT - HEALTHEAST (OUTPATIENT)
Dept: BEHAVIORAL HEALTH | Facility: HOSPITAL | Age: 85
End: 2020-11-18

## 2020-11-18 DIAGNOSIS — F43.22 ADJUSTMENT DISORDER WITH ANXIETY: ICD-10-CM

## 2020-11-18 DIAGNOSIS — F43.21 GRIEF REACTION: ICD-10-CM

## 2020-11-18 ASSESSMENT — PATIENT HEALTH QUESTIONNAIRE - PHQ9: SUM OF ALL RESPONSES TO PHQ QUESTIONS 1-9: 0

## 2020-11-18 ASSESSMENT — ANXIETY QUESTIONNAIRES
6. BECOMING EASILY ANNOYED OR IRRITABLE: NOT AT ALL
2. NOT BEING ABLE TO STOP OR CONTROL WORRYING: MORE THAN HALF THE DAYS
4. TROUBLE RELAXING: NOT AT ALL
3. WORRYING TOO MUCH ABOUT DIFFERENT THINGS: SEVERAL DAYS
5. BEING SO RESTLESS THAT IT IS HARD TO SIT STILL: NOT AT ALL
1. FEELING NERVOUS, ANXIOUS, OR ON EDGE: MORE THAN HALF THE DAYS
7. FEELING AFRAID AS IF SOMETHING AWFUL MIGHT HAPPEN: NOT AT ALL
GAD7 TOTAL SCORE: 5
IF YOU CHECKED OFF ANY PROBLEMS ON THIS QUESTIONNAIRE, HOW DIFFICULT HAVE THESE PROBLEMS MADE IT FOR YOU TO DO YOUR WORK, TAKE CARE OF THINGS AT HOME, OR GET ALONG WITH OTHER PEOPLE: SOMEWHAT DIFFICULT

## 2020-11-23 ENCOUNTER — COMMUNICATION - HEALTHEAST (OUTPATIENT)
Dept: NURSING | Facility: CLINIC | Age: 85
End: 2020-11-23

## 2020-11-25 ENCOUNTER — OFFICE VISIT - HEALTHEAST (OUTPATIENT)
Dept: FAMILY MEDICINE | Facility: CLINIC | Age: 85
End: 2020-11-25

## 2020-11-25 DIAGNOSIS — F43.21 GRIEF REACTION: ICD-10-CM

## 2020-11-25 DIAGNOSIS — M75.81 ROTATOR CUFF TENDONITIS, RIGHT: ICD-10-CM

## 2020-11-25 ASSESSMENT — MIFFLIN-ST. JEOR: SCORE: 1014.22

## 2020-11-25 NOTE — ASSESSMENT & PLAN NOTE
Recurrent problem, x-ray back in 2012 was negative.  Recommend physical therapy, she agreed.  Discussed option of injection, declinedat this point and I concur.  We will see back if needed.

## 2020-11-30 ENCOUNTER — COMMUNICATION - HEALTHEAST (OUTPATIENT)
Dept: CARE COORDINATION | Facility: CLINIC | Age: 85
End: 2020-11-30

## 2020-12-09 ENCOUNTER — AMBULATORY - HEALTHEAST (OUTPATIENT)
Dept: BEHAVIORAL HEALTH | Facility: HOSPITAL | Age: 85
End: 2020-12-09

## 2020-12-17 ENCOUNTER — COMMUNICATION - HEALTHEAST (OUTPATIENT)
Dept: NURSING | Facility: CLINIC | Age: 85
End: 2020-12-17

## 2021-01-06 ENCOUNTER — OFFICE VISIT - HEALTHEAST (OUTPATIENT)
Dept: BEHAVIORAL HEALTH | Facility: HOSPITAL | Age: 86
End: 2021-01-06

## 2021-01-06 ENCOUNTER — COMMUNICATION - HEALTHEAST (OUTPATIENT)
Dept: FAMILY MEDICINE | Facility: CLINIC | Age: 86
End: 2021-01-06

## 2021-01-06 DIAGNOSIS — F43.22 ADJUSTMENT DISORDER WITH ANXIETY: ICD-10-CM

## 2021-01-06 DIAGNOSIS — F43.21 GRIEF REACTION: ICD-10-CM

## 2021-01-06 DIAGNOSIS — I10 ESSENTIAL HYPERTENSION: ICD-10-CM

## 2021-01-06 RX ORDER — HYDROCHLOROTHIAZIDE 25 MG/1
25 TABLET ORAL DAILY
Qty: 30 TABLET | Refills: 11 | Status: SHIPPED | OUTPATIENT
Start: 2021-01-06 | End: 2022-01-13

## 2021-01-06 ASSESSMENT — ANXIETY QUESTIONNAIRES
GAD7 TOTAL SCORE: 2
IF YOU CHECKED OFF ANY PROBLEMS ON THIS QUESTIONNAIRE, HOW DIFFICULT HAVE THESE PROBLEMS MADE IT FOR YOU TO DO YOUR WORK, TAKE CARE OF THINGS AT HOME, OR GET ALONG WITH OTHER PEOPLE: NOT DIFFICULT AT ALL
4. TROUBLE RELAXING: NOT AT ALL
2. NOT BEING ABLE TO STOP OR CONTROL WORRYING: NOT AT ALL
7. FEELING AFRAID AS IF SOMETHING AWFUL MIGHT HAPPEN: NOT AT ALL
1. FEELING NERVOUS, ANXIOUS, OR ON EDGE: SEVERAL DAYS
6. BECOMING EASILY ANNOYED OR IRRITABLE: SEVERAL DAYS
5. BEING SO RESTLESS THAT IT IS HARD TO SIT STILL: NOT AT ALL
3. WORRYING TOO MUCH ABOUT DIFFERENT THINGS: NOT AT ALL

## 2021-01-06 ASSESSMENT — PATIENT HEALTH QUESTIONNAIRE - PHQ9: SUM OF ALL RESPONSES TO PHQ QUESTIONS 1-9: 0

## 2021-01-13 ENCOUNTER — OFFICE VISIT - HEALTHEAST (OUTPATIENT)
Dept: BEHAVIORAL HEALTH | Facility: HOSPITAL | Age: 86
End: 2021-01-13

## 2021-01-13 DIAGNOSIS — F43.22 ADJUSTMENT DISORDER WITH ANXIETY: ICD-10-CM

## 2021-01-13 DIAGNOSIS — F43.21 GRIEF REACTION: ICD-10-CM

## 2021-01-13 ASSESSMENT — ANXIETY QUESTIONNAIRES
2. NOT BEING ABLE TO STOP OR CONTROL WORRYING: NOT AT ALL
3. WORRYING TOO MUCH ABOUT DIFFERENT THINGS: NOT AT ALL
4. TROUBLE RELAXING: NOT AT ALL
7. FEELING AFRAID AS IF SOMETHING AWFUL MIGHT HAPPEN: NOT AT ALL
6. BECOMING EASILY ANNOYED OR IRRITABLE: NOT AT ALL
5. BEING SO RESTLESS THAT IT IS HARD TO SIT STILL: NOT AT ALL
1. FEELING NERVOUS, ANXIOUS, OR ON EDGE: NOT AT ALL
IF YOU CHECKED OFF ANY PROBLEMS ON THIS QUESTIONNAIRE, HOW DIFFICULT HAVE THESE PROBLEMS MADE IT FOR YOU TO DO YOUR WORK, TAKE CARE OF THINGS AT HOME, OR GET ALONG WITH OTHER PEOPLE: NOT DIFFICULT AT ALL
GAD7 TOTAL SCORE: 0

## 2021-01-13 ASSESSMENT — PATIENT HEALTH QUESTIONNAIRE - PHQ9: SUM OF ALL RESPONSES TO PHQ QUESTIONS 1-9: 0

## 2021-01-20 ENCOUNTER — OFFICE VISIT - HEALTHEAST (OUTPATIENT)
Dept: BEHAVIORAL HEALTH | Facility: HOSPITAL | Age: 86
End: 2021-01-20

## 2021-01-20 ENCOUNTER — COMMUNICATION - HEALTHEAST (OUTPATIENT)
Dept: NURSING | Facility: CLINIC | Age: 86
End: 2021-01-20

## 2021-01-20 DIAGNOSIS — F43.21 GRIEF REACTION: ICD-10-CM

## 2021-01-20 DIAGNOSIS — F43.22 ADJUSTMENT DISORDER WITH ANXIETY: ICD-10-CM

## 2021-01-20 ASSESSMENT — PATIENT HEALTH QUESTIONNAIRE - PHQ9: SUM OF ALL RESPONSES TO PHQ QUESTIONS 1-9: 0

## 2021-01-20 ASSESSMENT — ANXIETY QUESTIONNAIRES
5. BEING SO RESTLESS THAT IT IS HARD TO SIT STILL: NOT AT ALL
2. NOT BEING ABLE TO STOP OR CONTROL WORRYING: NOT AT ALL
7. FEELING AFRAID AS IF SOMETHING AWFUL MIGHT HAPPEN: NOT AT ALL
3. WORRYING TOO MUCH ABOUT DIFFERENT THINGS: NOT AT ALL
1. FEELING NERVOUS, ANXIOUS, OR ON EDGE: NOT AT ALL
4. TROUBLE RELAXING: NOT AT ALL
IF YOU CHECKED OFF ANY PROBLEMS ON THIS QUESTIONNAIRE, HOW DIFFICULT HAVE THESE PROBLEMS MADE IT FOR YOU TO DO YOUR WORK, TAKE CARE OF THINGS AT HOME, OR GET ALONG WITH OTHER PEOPLE: NOT DIFFICULT AT ALL
GAD7 TOTAL SCORE: 0
6. BECOMING EASILY ANNOYED OR IRRITABLE: NOT AT ALL

## 2021-01-25 ENCOUNTER — COMMUNICATION - HEALTHEAST (OUTPATIENT)
Dept: NURSING | Facility: CLINIC | Age: 86
End: 2021-01-25

## 2021-01-27 ENCOUNTER — COMMUNICATION - HEALTHEAST (OUTPATIENT)
Dept: CARE COORDINATION | Facility: CLINIC | Age: 86
End: 2021-01-27

## 2021-01-27 ENCOUNTER — OFFICE VISIT - HEALTHEAST (OUTPATIENT)
Dept: BEHAVIORAL HEALTH | Facility: HOSPITAL | Age: 86
End: 2021-01-27

## 2021-01-27 DIAGNOSIS — F43.22 ADJUSTMENT DISORDER WITH ANXIETY: ICD-10-CM

## 2021-01-27 DIAGNOSIS — F43.21 GRIEF REACTION: ICD-10-CM

## 2021-01-27 ASSESSMENT — ANXIETY QUESTIONNAIRES
GAD7 TOTAL SCORE: 0
3. WORRYING TOO MUCH ABOUT DIFFERENT THINGS: NOT AT ALL
5. BEING SO RESTLESS THAT IT IS HARD TO SIT STILL: NOT AT ALL
6. BECOMING EASILY ANNOYED OR IRRITABLE: NOT AT ALL
4. TROUBLE RELAXING: NOT AT ALL
IF YOU CHECKED OFF ANY PROBLEMS ON THIS QUESTIONNAIRE, HOW DIFFICULT HAVE THESE PROBLEMS MADE IT FOR YOU TO DO YOUR WORK, TAKE CARE OF THINGS AT HOME, OR GET ALONG WITH OTHER PEOPLE: NOT DIFFICULT AT ALL
2. NOT BEING ABLE TO STOP OR CONTROL WORRYING: NOT AT ALL
7. FEELING AFRAID AS IF SOMETHING AWFUL MIGHT HAPPEN: NOT AT ALL
1. FEELING NERVOUS, ANXIOUS, OR ON EDGE: NOT AT ALL

## 2021-01-27 ASSESSMENT — PATIENT HEALTH QUESTIONNAIRE - PHQ9: SUM OF ALL RESPONSES TO PHQ QUESTIONS 1-9: 0

## 2021-02-03 ENCOUNTER — COMMUNICATION - HEALTHEAST (OUTPATIENT)
Dept: FAMILY MEDICINE | Facility: CLINIC | Age: 86
End: 2021-02-03

## 2021-02-03 DIAGNOSIS — I10 ESSENTIAL HYPERTENSION: ICD-10-CM

## 2021-02-03 RX ORDER — LOSARTAN POTASSIUM 100 MG/1
100 TABLET ORAL DAILY
Qty: 90 TABLET | Refills: 2 | Status: SHIPPED | OUTPATIENT
Start: 2021-02-03 | End: 2022-01-13

## 2021-02-04 ENCOUNTER — OFFICE VISIT - HEALTHEAST (OUTPATIENT)
Dept: BEHAVIORAL HEALTH | Facility: HOSPITAL | Age: 86
End: 2021-02-04

## 2021-02-04 ENCOUNTER — AMBULATORY - HEALTHEAST (OUTPATIENT)
Dept: BEHAVIORAL HEALTH | Facility: HOSPITAL | Age: 86
End: 2021-02-04

## 2021-02-04 DIAGNOSIS — F43.22 ADJUSTMENT DISORDER WITH ANXIETY: ICD-10-CM

## 2021-02-04 DIAGNOSIS — F43.21 GRIEF REACTION: ICD-10-CM

## 2021-02-04 ASSESSMENT — PATIENT HEALTH QUESTIONNAIRE - PHQ9: SUM OF ALL RESPONSES TO PHQ QUESTIONS 1-9: 0

## 2021-02-04 ASSESSMENT — ANXIETY QUESTIONNAIRES
3. WORRYING TOO MUCH ABOUT DIFFERENT THINGS: NOT AT ALL
5. BEING SO RESTLESS THAT IT IS HARD TO SIT STILL: NOT AT ALL
GAD7 TOTAL SCORE: 0
4. TROUBLE RELAXING: NOT AT ALL
7. FEELING AFRAID AS IF SOMETHING AWFUL MIGHT HAPPEN: NOT AT ALL
IF YOU CHECKED OFF ANY PROBLEMS ON THIS QUESTIONNAIRE, HOW DIFFICULT HAVE THESE PROBLEMS MADE IT FOR YOU TO DO YOUR WORK, TAKE CARE OF THINGS AT HOME, OR GET ALONG WITH OTHER PEOPLE: NOT DIFFICULT AT ALL
6. BECOMING EASILY ANNOYED OR IRRITABLE: NOT AT ALL
1. FEELING NERVOUS, ANXIOUS, OR ON EDGE: NOT AT ALL
2. NOT BEING ABLE TO STOP OR CONTROL WORRYING: NOT AT ALL

## 2021-02-10 ENCOUNTER — OFFICE VISIT - HEALTHEAST (OUTPATIENT)
Dept: BEHAVIORAL HEALTH | Facility: HOSPITAL | Age: 86
End: 2021-02-10

## 2021-02-10 DIAGNOSIS — F43.21 GRIEF REACTION: ICD-10-CM

## 2021-02-10 DIAGNOSIS — F43.22 ADJUSTMENT DISORDER WITH ANXIETY: ICD-10-CM

## 2021-02-10 ASSESSMENT — PATIENT HEALTH QUESTIONNAIRE - PHQ9: SUM OF ALL RESPONSES TO PHQ QUESTIONS 1-9: 0

## 2021-02-10 ASSESSMENT — ANXIETY QUESTIONNAIRES
6. BECOMING EASILY ANNOYED OR IRRITABLE: NOT AT ALL
IF YOU CHECKED OFF ANY PROBLEMS ON THIS QUESTIONNAIRE, HOW DIFFICULT HAVE THESE PROBLEMS MADE IT FOR YOU TO DO YOUR WORK, TAKE CARE OF THINGS AT HOME, OR GET ALONG WITH OTHER PEOPLE: NOT DIFFICULT AT ALL
GAD7 TOTAL SCORE: 0
4. TROUBLE RELAXING: NOT AT ALL
7. FEELING AFRAID AS IF SOMETHING AWFUL MIGHT HAPPEN: NOT AT ALL
3. WORRYING TOO MUCH ABOUT DIFFERENT THINGS: NOT AT ALL
1. FEELING NERVOUS, ANXIOUS, OR ON EDGE: NOT AT ALL
2. NOT BEING ABLE TO STOP OR CONTROL WORRYING: NOT AT ALL
5. BEING SO RESTLESS THAT IT IS HARD TO SIT STILL: NOT AT ALL

## 2021-02-17 ENCOUNTER — COMMUNICATION - HEALTHEAST (OUTPATIENT)
Dept: FAMILY MEDICINE | Facility: CLINIC | Age: 86
End: 2021-02-17

## 2021-02-17 ENCOUNTER — OFFICE VISIT - HEALTHEAST (OUTPATIENT)
Dept: BEHAVIORAL HEALTH | Facility: HOSPITAL | Age: 86
End: 2021-02-17

## 2021-02-17 DIAGNOSIS — F43.21 GRIEF REACTION: ICD-10-CM

## 2021-02-17 DIAGNOSIS — F43.22 ADJUSTMENT DISORDER WITH ANXIETY: ICD-10-CM

## 2021-02-17 ASSESSMENT — ANXIETY QUESTIONNAIRES
1. FEELING NERVOUS, ANXIOUS, OR ON EDGE: NOT AT ALL
6. BECOMING EASILY ANNOYED OR IRRITABLE: NOT AT ALL
IF YOU CHECKED OFF ANY PROBLEMS ON THIS QUESTIONNAIRE, HOW DIFFICULT HAVE THESE PROBLEMS MADE IT FOR YOU TO DO YOUR WORK, TAKE CARE OF THINGS AT HOME, OR GET ALONG WITH OTHER PEOPLE: NOT DIFFICULT AT ALL
3. WORRYING TOO MUCH ABOUT DIFFERENT THINGS: NOT AT ALL
7. FEELING AFRAID AS IF SOMETHING AWFUL MIGHT HAPPEN: NOT AT ALL
4. TROUBLE RELAXING: NOT AT ALL
GAD7 TOTAL SCORE: 0
5. BEING SO RESTLESS THAT IT IS HARD TO SIT STILL: NOT AT ALL
2. NOT BEING ABLE TO STOP OR CONTROL WORRYING: NOT AT ALL

## 2021-02-17 ASSESSMENT — PATIENT HEALTH QUESTIONNAIRE - PHQ9: SUM OF ALL RESPONSES TO PHQ QUESTIONS 1-9: 0

## 2021-02-20 ENCOUNTER — IMMUNIZATION (OUTPATIENT)
Dept: FAMILY MEDICINE | Facility: CLINIC | Age: 86
End: 2021-02-20
Payer: COMMERCIAL

## 2021-02-20 PROCEDURE — 0001A PR COVID VAC PFIZER DIL RECON 30 MCG/0.3 ML IM: CPT

## 2021-02-20 PROCEDURE — 91300 PR COVID VAC PFIZER DIL RECON 30 MCG/0.3 ML IM: CPT

## 2021-02-22 ENCOUNTER — COMMUNICATION - HEALTHEAST (OUTPATIENT)
Dept: NURSING | Facility: CLINIC | Age: 86
End: 2021-02-22

## 2021-02-26 ENCOUNTER — AMBULATORY - HEALTHEAST (OUTPATIENT)
Dept: BEHAVIORAL HEALTH | Facility: HOSPITAL | Age: 86
End: 2021-02-26

## 2021-03-02 ENCOUNTER — COMMUNICATION - HEALTHEAST (OUTPATIENT)
Dept: CARE COORDINATION | Facility: CLINIC | Age: 86
End: 2021-03-02

## 2021-03-03 ENCOUNTER — OFFICE VISIT - HEALTHEAST (OUTPATIENT)
Dept: BEHAVIORAL HEALTH | Facility: HOSPITAL | Age: 86
End: 2021-03-03

## 2021-03-03 DIAGNOSIS — F43.21 GRIEF REACTION: ICD-10-CM

## 2021-03-03 DIAGNOSIS — F43.22 ADJUSTMENT DISORDER WITH ANXIETY: ICD-10-CM

## 2021-03-03 ASSESSMENT — ANXIETY QUESTIONNAIRES
IF YOU CHECKED OFF ANY PROBLEMS ON THIS QUESTIONNAIRE, HOW DIFFICULT HAVE THESE PROBLEMS MADE IT FOR YOU TO DO YOUR WORK, TAKE CARE OF THINGS AT HOME, OR GET ALONG WITH OTHER PEOPLE: NOT DIFFICULT AT ALL
3. WORRYING TOO MUCH ABOUT DIFFERENT THINGS: NOT AT ALL
6. BECOMING EASILY ANNOYED OR IRRITABLE: NOT AT ALL
5. BEING SO RESTLESS THAT IT IS HARD TO SIT STILL: NOT AT ALL
GAD7 TOTAL SCORE: 0
4. TROUBLE RELAXING: NOT AT ALL
7. FEELING AFRAID AS IF SOMETHING AWFUL MIGHT HAPPEN: NOT AT ALL
2. NOT BEING ABLE TO STOP OR CONTROL WORRYING: NOT AT ALL
1. FEELING NERVOUS, ANXIOUS, OR ON EDGE: NOT AT ALL

## 2021-03-03 ASSESSMENT — PATIENT HEALTH QUESTIONNAIRE - PHQ9: SUM OF ALL RESPONSES TO PHQ QUESTIONS 1-9: 0

## 2021-03-05 ENCOUNTER — OFFICE VISIT - HEALTHEAST (OUTPATIENT)
Dept: FAMILY MEDICINE | Facility: CLINIC | Age: 86
End: 2021-03-05

## 2021-03-05 DIAGNOSIS — M75.81 ROTATOR CUFF TENDONITIS, RIGHT: ICD-10-CM

## 2021-03-05 ASSESSMENT — MIFFLIN-ST. JEOR: SCORE: 1000.61

## 2021-03-05 NOTE — ASSESSMENT & PLAN NOTE
Today with prominence of long head of the biceps tendinitis.  Patient was referred for physical therapy/OT last time I saw her, 11/25/2020.  However, it appears that she was not contacted for this.  Will refer again today.  Offered long head of the biceps injection which she accepted.  Bicipital groove/tendon was found using thumb.  40 mg of Depo-Medrol 1 cc of lidocaine were injected with a 27-gauge 1-1/2 inch long needle around the bicipital groove,  This was prepped with alcohol prior to this.  Patient tolerated the procedure well.

## 2021-03-10 ENCOUNTER — OFFICE VISIT - HEALTHEAST (OUTPATIENT)
Dept: BEHAVIORAL HEALTH | Facility: HOSPITAL | Age: 86
End: 2021-03-10

## 2021-03-10 DIAGNOSIS — F43.22 ADJUSTMENT DISORDER WITH ANXIETY: ICD-10-CM

## 2021-03-10 DIAGNOSIS — F43.21 GRIEF REACTION: ICD-10-CM

## 2021-03-10 ASSESSMENT — ANXIETY QUESTIONNAIRES
3. WORRYING TOO MUCH ABOUT DIFFERENT THINGS: NOT AT ALL
5. BEING SO RESTLESS THAT IT IS HARD TO SIT STILL: NOT AT ALL
7. FEELING AFRAID AS IF SOMETHING AWFUL MIGHT HAPPEN: NOT AT ALL
4. TROUBLE RELAXING: NOT AT ALL
IF YOU CHECKED OFF ANY PROBLEMS ON THIS QUESTIONNAIRE, HOW DIFFICULT HAVE THESE PROBLEMS MADE IT FOR YOU TO DO YOUR WORK, TAKE CARE OF THINGS AT HOME, OR GET ALONG WITH OTHER PEOPLE: NOT DIFFICULT AT ALL
6. BECOMING EASILY ANNOYED OR IRRITABLE: NOT AT ALL
GAD7 TOTAL SCORE: 0
2. NOT BEING ABLE TO STOP OR CONTROL WORRYING: NOT AT ALL
1. FEELING NERVOUS, ANXIOUS, OR ON EDGE: NOT AT ALL

## 2021-03-10 ASSESSMENT — PATIENT HEALTH QUESTIONNAIRE - PHQ9: SUM OF ALL RESPONSES TO PHQ QUESTIONS 1-9: 0

## 2021-03-13 ENCOUNTER — IMMUNIZATION (OUTPATIENT)
Dept: FAMILY MEDICINE | Facility: CLINIC | Age: 86
End: 2021-03-13
Attending: FAMILY MEDICINE
Payer: COMMERCIAL

## 2021-03-13 PROCEDURE — 0002A PR COVID VAC PFIZER DIL RECON 30 MCG/0.3 ML IM: CPT

## 2021-03-13 PROCEDURE — 91300 PR COVID VAC PFIZER DIL RECON 30 MCG/0.3 ML IM: CPT

## 2021-03-18 ENCOUNTER — OFFICE VISIT - HEALTHEAST (OUTPATIENT)
Dept: BEHAVIORAL HEALTH | Facility: HOSPITAL | Age: 86
End: 2021-03-18

## 2021-03-18 DIAGNOSIS — F43.22 ADJUSTMENT DISORDER WITH ANXIETY: ICD-10-CM

## 2021-03-18 DIAGNOSIS — F43.21 GRIEF REACTION: ICD-10-CM

## 2021-03-18 ASSESSMENT — ANXIETY QUESTIONNAIRES
6. BECOMING EASILY ANNOYED OR IRRITABLE: NOT AT ALL
5. BEING SO RESTLESS THAT IT IS HARD TO SIT STILL: NOT AT ALL
7. FEELING AFRAID AS IF SOMETHING AWFUL MIGHT HAPPEN: NOT AT ALL
4. TROUBLE RELAXING: NOT AT ALL
2. NOT BEING ABLE TO STOP OR CONTROL WORRYING: NOT AT ALL
IF YOU CHECKED OFF ANY PROBLEMS ON THIS QUESTIONNAIRE, HOW DIFFICULT HAVE THESE PROBLEMS MADE IT FOR YOU TO DO YOUR WORK, TAKE CARE OF THINGS AT HOME, OR GET ALONG WITH OTHER PEOPLE: NOT DIFFICULT AT ALL
1. FEELING NERVOUS, ANXIOUS, OR ON EDGE: NOT AT ALL
3. WORRYING TOO MUCH ABOUT DIFFERENT THINGS: NOT AT ALL
GAD7 TOTAL SCORE: 0

## 2021-03-18 ASSESSMENT — PATIENT HEALTH QUESTIONNAIRE - PHQ9: SUM OF ALL RESPONSES TO PHQ QUESTIONS 1-9: 0

## 2021-03-24 ENCOUNTER — COMMUNICATION - HEALTHEAST (OUTPATIENT)
Dept: NURSING | Facility: CLINIC | Age: 86
End: 2021-03-24

## 2021-03-25 ENCOUNTER — OFFICE VISIT - HEALTHEAST (OUTPATIENT)
Dept: BEHAVIORAL HEALTH | Facility: HOSPITAL | Age: 86
End: 2021-03-25

## 2021-03-25 DIAGNOSIS — F43.21 GRIEF REACTION: ICD-10-CM

## 2021-03-25 DIAGNOSIS — F43.22 ADJUSTMENT DISORDER WITH ANXIETY: ICD-10-CM

## 2021-03-25 ASSESSMENT — ANXIETY QUESTIONNAIRES
IF YOU CHECKED OFF ANY PROBLEMS ON THIS QUESTIONNAIRE, HOW DIFFICULT HAVE THESE PROBLEMS MADE IT FOR YOU TO DO YOUR WORK, TAKE CARE OF THINGS AT HOME, OR GET ALONG WITH OTHER PEOPLE: NOT DIFFICULT AT ALL
4. TROUBLE RELAXING: NOT AT ALL
2. NOT BEING ABLE TO STOP OR CONTROL WORRYING: NOT AT ALL
3. WORRYING TOO MUCH ABOUT DIFFERENT THINGS: NOT AT ALL
6. BECOMING EASILY ANNOYED OR IRRITABLE: NOT AT ALL
7. FEELING AFRAID AS IF SOMETHING AWFUL MIGHT HAPPEN: NOT AT ALL
1. FEELING NERVOUS, ANXIOUS, OR ON EDGE: NOT AT ALL
5. BEING SO RESTLESS THAT IT IS HARD TO SIT STILL: NOT AT ALL
GAD7 TOTAL SCORE: 0

## 2021-03-25 ASSESSMENT — PATIENT HEALTH QUESTIONNAIRE - PHQ9: SUM OF ALL RESPONSES TO PHQ QUESTIONS 1-9: 0

## 2021-04-01 ENCOUNTER — OFFICE VISIT - HEALTHEAST (OUTPATIENT)
Dept: BEHAVIORAL HEALTH | Facility: HOSPITAL | Age: 86
End: 2021-04-01

## 2021-04-01 DIAGNOSIS — F43.22 ADJUSTMENT DISORDER WITH ANXIETY: ICD-10-CM

## 2021-04-01 DIAGNOSIS — F43.21 GRIEF REACTION: ICD-10-CM

## 2021-04-01 ASSESSMENT — ANXIETY QUESTIONNAIRES
5. BEING SO RESTLESS THAT IT IS HARD TO SIT STILL: NOT AT ALL
4. TROUBLE RELAXING: NOT AT ALL
7. FEELING AFRAID AS IF SOMETHING AWFUL MIGHT HAPPEN: NOT AT ALL
3. WORRYING TOO MUCH ABOUT DIFFERENT THINGS: NOT AT ALL
1. FEELING NERVOUS, ANXIOUS, OR ON EDGE: NOT AT ALL
6. BECOMING EASILY ANNOYED OR IRRITABLE: NOT AT ALL
GAD7 TOTAL SCORE: 0
2. NOT BEING ABLE TO STOP OR CONTROL WORRYING: NOT AT ALL
IF YOU CHECKED OFF ANY PROBLEMS ON THIS QUESTIONNAIRE, HOW DIFFICULT HAVE THESE PROBLEMS MADE IT FOR YOU TO DO YOUR WORK, TAKE CARE OF THINGS AT HOME, OR GET ALONG WITH OTHER PEOPLE: NOT DIFFICULT AT ALL

## 2021-04-01 ASSESSMENT — PATIENT HEALTH QUESTIONNAIRE - PHQ9: SUM OF ALL RESPONSES TO PHQ QUESTIONS 1-9: 0

## 2021-04-16 ENCOUNTER — COMMUNICATION - HEALTHEAST (OUTPATIENT)
Dept: CARE COORDINATION | Facility: CLINIC | Age: 86
End: 2021-04-16

## 2021-04-16 ENCOUNTER — OFFICE VISIT - HEALTHEAST (OUTPATIENT)
Dept: BEHAVIORAL HEALTH | Facility: HOSPITAL | Age: 86
End: 2021-04-16

## 2021-04-16 DIAGNOSIS — F43.21 GRIEF REACTION: ICD-10-CM

## 2021-04-16 DIAGNOSIS — F43.22 ADJUSTMENT DISORDER WITH ANXIETY: ICD-10-CM

## 2021-04-16 ASSESSMENT — ANXIETY QUESTIONNAIRES
4. TROUBLE RELAXING: NOT AT ALL
2. NOT BEING ABLE TO STOP OR CONTROL WORRYING: NOT AT ALL
3. WORRYING TOO MUCH ABOUT DIFFERENT THINGS: NOT AT ALL
IF YOU CHECKED OFF ANY PROBLEMS ON THIS QUESTIONNAIRE, HOW DIFFICULT HAVE THESE PROBLEMS MADE IT FOR YOU TO DO YOUR WORK, TAKE CARE OF THINGS AT HOME, OR GET ALONG WITH OTHER PEOPLE: NOT DIFFICULT AT ALL
7. FEELING AFRAID AS IF SOMETHING AWFUL MIGHT HAPPEN: NOT AT ALL
GAD7 TOTAL SCORE: 0
1. FEELING NERVOUS, ANXIOUS, OR ON EDGE: NOT AT ALL
5. BEING SO RESTLESS THAT IT IS HARD TO SIT STILL: NOT AT ALL
6. BECOMING EASILY ANNOYED OR IRRITABLE: NOT AT ALL

## 2021-04-16 ASSESSMENT — PATIENT HEALTH QUESTIONNAIRE - PHQ9: SUM OF ALL RESPONSES TO PHQ QUESTIONS 1-9: 0

## 2021-04-27 ENCOUNTER — COMMUNICATION - HEALTHEAST (OUTPATIENT)
Dept: NURSING | Facility: CLINIC | Age: 86
End: 2021-04-27

## 2021-04-28 ENCOUNTER — OFFICE VISIT - HEALTHEAST (OUTPATIENT)
Dept: BEHAVIORAL HEALTH | Facility: HOSPITAL | Age: 86
End: 2021-04-28

## 2021-04-28 DIAGNOSIS — F43.21 GRIEF REACTION: ICD-10-CM

## 2021-04-28 DIAGNOSIS — F43.22 ADJUSTMENT DISORDER WITH ANXIETY: ICD-10-CM

## 2021-04-28 ASSESSMENT — ANXIETY QUESTIONNAIRES
IF YOU CHECKED OFF ANY PROBLEMS ON THIS QUESTIONNAIRE, HOW DIFFICULT HAVE THESE PROBLEMS MADE IT FOR YOU TO DO YOUR WORK, TAKE CARE OF THINGS AT HOME, OR GET ALONG WITH OTHER PEOPLE: NOT DIFFICULT AT ALL
GAD7 TOTAL SCORE: 0
4. TROUBLE RELAXING: NOT AT ALL
7. FEELING AFRAID AS IF SOMETHING AWFUL MIGHT HAPPEN: NOT AT ALL
6. BECOMING EASILY ANNOYED OR IRRITABLE: NOT AT ALL
2. NOT BEING ABLE TO STOP OR CONTROL WORRYING: NOT AT ALL
3. WORRYING TOO MUCH ABOUT DIFFERENT THINGS: NOT AT ALL
1. FEELING NERVOUS, ANXIOUS, OR ON EDGE: NOT AT ALL
5. BEING SO RESTLESS THAT IT IS HARD TO SIT STILL: NOT AT ALL

## 2021-04-28 ASSESSMENT — PATIENT HEALTH QUESTIONNAIRE - PHQ9: SUM OF ALL RESPONSES TO PHQ QUESTIONS 1-9: 0

## 2021-05-04 ENCOUNTER — COMMUNICATION - HEALTHEAST (OUTPATIENT)
Dept: NURSING | Facility: CLINIC | Age: 86
End: 2021-05-04

## 2021-05-05 ENCOUNTER — COMMUNICATION - HEALTHEAST (OUTPATIENT)
Dept: FAMILY MEDICINE | Facility: CLINIC | Age: 86
End: 2021-05-05

## 2021-05-05 DIAGNOSIS — I10 ESSENTIAL HYPERTENSION: ICD-10-CM

## 2021-05-06 ENCOUNTER — OFFICE VISIT - HEALTHEAST (OUTPATIENT)
Dept: FAMILY MEDICINE | Facility: CLINIC | Age: 86
End: 2021-05-06

## 2021-05-06 DIAGNOSIS — M25.532 LEFT WRIST PAIN: ICD-10-CM

## 2021-05-06 RX ORDER — AMLODIPINE BESYLATE 10 MG/1
TABLET ORAL
Qty: 90 TABLET | Refills: 3 | Status: SHIPPED | OUTPATIENT
Start: 2021-05-06 | End: 2021-08-12

## 2021-05-12 ENCOUNTER — OFFICE VISIT - HEALTHEAST (OUTPATIENT)
Dept: BEHAVIORAL HEALTH | Facility: HOSPITAL | Age: 86
End: 2021-05-12

## 2021-05-12 ENCOUNTER — AMBULATORY - HEALTHEAST (OUTPATIENT)
Dept: BEHAVIORAL HEALTH | Facility: HOSPITAL | Age: 86
End: 2021-05-12

## 2021-05-12 DIAGNOSIS — F43.22 ADJUSTMENT DISORDER WITH ANXIETY: ICD-10-CM

## 2021-05-12 DIAGNOSIS — F43.21 GRIEF REACTION: ICD-10-CM

## 2021-05-12 ASSESSMENT — ANXIETY QUESTIONNAIRES
IF YOU CHECKED OFF ANY PROBLEMS ON THIS QUESTIONNAIRE, HOW DIFFICULT HAVE THESE PROBLEMS MADE IT FOR YOU TO DO YOUR WORK, TAKE CARE OF THINGS AT HOME, OR GET ALONG WITH OTHER PEOPLE: NOT DIFFICULT AT ALL
7. FEELING AFRAID AS IF SOMETHING AWFUL MIGHT HAPPEN: NOT AT ALL
2. NOT BEING ABLE TO STOP OR CONTROL WORRYING: NOT AT ALL
1. FEELING NERVOUS, ANXIOUS, OR ON EDGE: NOT AT ALL
6. BECOMING EASILY ANNOYED OR IRRITABLE: NOT AT ALL
GAD7 TOTAL SCORE: 0
5. BEING SO RESTLESS THAT IT IS HARD TO SIT STILL: NOT AT ALL
4. TROUBLE RELAXING: NOT AT ALL
3. WORRYING TOO MUCH ABOUT DIFFERENT THINGS: NOT AT ALL

## 2021-05-26 ENCOUNTER — OFFICE VISIT - HEALTHEAST (OUTPATIENT)
Dept: FAMILY MEDICINE | Facility: CLINIC | Age: 86
End: 2021-05-26

## 2021-05-26 ENCOUNTER — OFFICE VISIT - HEALTHEAST (OUTPATIENT)
Dept: BEHAVIORAL HEALTH | Facility: HOSPITAL | Age: 86
End: 2021-05-26

## 2021-05-26 ENCOUNTER — COMMUNICATION - HEALTHEAST (OUTPATIENT)
Dept: NURSING | Facility: CLINIC | Age: 86
End: 2021-05-26

## 2021-05-26 DIAGNOSIS — F43.21 GRIEF REACTION: ICD-10-CM

## 2021-05-26 DIAGNOSIS — M18.12 ARTHRITIS OF CARPOMETACARPAL (CMC) JOINT OF LEFT THUMB: ICD-10-CM

## 2021-05-26 DIAGNOSIS — I10 ESSENTIAL HYPERTENSION: ICD-10-CM

## 2021-05-26 DIAGNOSIS — F43.22 ADJUSTMENT DISORDER WITH ANXIETY: ICD-10-CM

## 2021-05-26 LAB
ANION GAP SERPL CALCULATED.3IONS-SCNC: 12 MMOL/L (ref 5–18)
BUN SERPL-MCNC: 22 MG/DL (ref 8–28)
CALCIUM SERPL-MCNC: 8.8 MG/DL (ref 8.5–10.5)
CHLORIDE BLD-SCNC: 104 MMOL/L (ref 98–107)
CO2 SERPL-SCNC: 24 MMOL/L (ref 22–31)
CREAT SERPL-MCNC: 0.94 MG/DL (ref 0.6–1.1)
GFR SERPL CREATININE-BSD FRML MDRD: 56 ML/MIN/1.73M2
GLUCOSE BLD-MCNC: 99 MG/DL (ref 70–125)
HGB BLD-MCNC: 11.8 G/DL (ref 12–16)
POTASSIUM BLD-SCNC: 3.9 MMOL/L (ref 3.5–5)
SODIUM SERPL-SCNC: 140 MMOL/L (ref 136–145)

## 2021-05-26 ASSESSMENT — ANXIETY QUESTIONNAIRES
6. BECOMING EASILY ANNOYED OR IRRITABLE: NOT AT ALL
2. NOT BEING ABLE TO STOP OR CONTROL WORRYING: NOT AT ALL
7. FEELING AFRAID AS IF SOMETHING AWFUL MIGHT HAPPEN: NOT AT ALL
IF YOU CHECKED OFF ANY PROBLEMS ON THIS QUESTIONNAIRE, HOW DIFFICULT HAVE THESE PROBLEMS MADE IT FOR YOU TO DO YOUR WORK, TAKE CARE OF THINGS AT HOME, OR GET ALONG WITH OTHER PEOPLE: NOT DIFFICULT AT ALL
4. TROUBLE RELAXING: NOT AT ALL
3. WORRYING TOO MUCH ABOUT DIFFERENT THINGS: NOT AT ALL
1. FEELING NERVOUS, ANXIOUS, OR ON EDGE: NOT AT ALL
5. BEING SO RESTLESS THAT IT IS HARD TO SIT STILL: NOT AT ALL
GAD7 TOTAL SCORE: 0

## 2021-05-26 ASSESSMENT — PATIENT HEALTH QUESTIONNAIRE - PHQ9
SUM OF ALL RESPONSES TO PHQ QUESTIONS 1-9: 1
SUM OF ALL RESPONSES TO PHQ QUESTIONS 1-9: 0
SUM OF ALL RESPONSES TO PHQ QUESTIONS 1-9: 0

## 2021-05-26 NOTE — ASSESSMENT & PLAN NOTE
Blood pressure well controlled on amlodipine 10, hydrochlorothiazide 25 and losartan 100.  No changes, check BMP and hemoglobin today.

## 2021-05-26 NOTE — ASSESSMENT & PLAN NOTE
Wrist pain seems more localized now to this joint.  Has improved somewhat over the last visit.  Recommend ongoing use of acetaminophen, could add diclofenac gel.  Patient has wrist splint but probably not as effective as thumb spica splint would be.  Offered this but declined at this point.  Recommend relative rest, agree with idea of ice.  No further intervention planned at this point.

## 2021-05-27 ENCOUNTER — COMMUNICATION - HEALTHEAST (OUTPATIENT)
Dept: FAMILY MEDICINE | Facility: CLINIC | Age: 86
End: 2021-05-27

## 2021-05-27 VITALS
DIASTOLIC BLOOD PRESSURE: 77 MMHG | HEART RATE: 99 BPM | WEIGHT: 150 LBS | SYSTOLIC BLOOD PRESSURE: 134 MMHG | RESPIRATION RATE: 16 BRPM

## 2021-05-27 ASSESSMENT — PATIENT HEALTH QUESTIONNAIRE - PHQ9
SUM OF ALL RESPONSES TO PHQ QUESTIONS 1-9: 2
SUM OF ALL RESPONSES TO PHQ QUESTIONS 1-9: 2
SUM OF ALL RESPONSES TO PHQ QUESTIONS 1-9: 0
SUM OF ALL RESPONSES TO PHQ QUESTIONS 1-9: 11
SUM OF ALL RESPONSES TO PHQ QUESTIONS 1-9: 0

## 2021-05-28 ENCOUNTER — RECORDS - HEALTHEAST (OUTPATIENT)
Dept: ADMINISTRATIVE | Facility: CLINIC | Age: 86
End: 2021-05-28

## 2021-05-28 ASSESSMENT — ANXIETY QUESTIONNAIRES
GAD7 TOTAL SCORE: 0
GAD7 TOTAL SCORE: 0
GAD7 TOTAL SCORE: 2
GAD7 TOTAL SCORE: 0
GAD7 TOTAL SCORE: 0
GAD7 TOTAL SCORE: 9
GAD7 TOTAL SCORE: 0
GAD7 TOTAL SCORE: 5
GAD7 TOTAL SCORE: 20
GAD7 TOTAL SCORE: 3
GAD7 TOTAL SCORE: 10
GAD7 TOTAL SCORE: 7
GAD7 TOTAL SCORE: 0

## 2021-05-29 ENCOUNTER — RECORDS - HEALTHEAST (OUTPATIENT)
Dept: ADMINISTRATIVE | Facility: CLINIC | Age: 86
End: 2021-05-29

## 2021-05-29 NOTE — TELEPHONE ENCOUNTER
Refill Approved    Rx renewed per Medication Renewal Policy. Medication was last renewed on 8/10/18.    Virginia Rosario, [facility] [place] Connection Triage/Med Refill 5/31/2019     Requested Prescriptions   Pending Prescriptions Disp Refills     losartan-hydrochlorothiazide (HYZAAR) 100-25 mg per tablet [Pharmacy Med Name: LOSARTAN/HCTZ 100-25MG TAB] 90 tablet 2     Sig: TAKE 1 TABLET BY MOUTH ONCE DAILY       Diuretics/Combination Diuretics Refill Protocol  Passed - 5/30/2019  9:22 AM        Passed - Visit with PCP or prescribing provider visit in past 12 months     Last office visit with prescriber/PCP: 10/16/2018 Abdulkadir Rodriguez MD OR same dept: 10/22/2018 Eda Valencia MD OR same specialty: 10/22/2018 Eda Valencia MD  Last physical: Visit date not found Last MTM visit: Visit date not found   Next visit within 3 mo: Visit date not found  Next physical within 3 mo: Visit date not found  Prescriber OR PCP: Abdulkadir Rodriguez MD  Last diagnosis associated with med order: There are no diagnoses linked to this encounter.  If protocol passes may refill for 12 months if within 3 months of last provider visit (or a total of 15 months).             Passed - Serum Potassium in past 12 months      Lab Results   Component Value Date    Potassium 3.8 10/02/2018             Passed - Serum Sodium in past 12 months      Lab Results   Component Value Date    Sodium 140 10/02/2018             Passed - Blood pressure on file in past 12 months     BP Readings from Last 1 Encounters:   10/22/18 124/70             Passed - Serum Creatinine in past 12 months      Creatinine   Date Value Ref Range Status   10/02/2018 1.24 (H) 0.60 - 1.10 mg/dL Final

## 2021-05-30 VITALS — HEIGHT: 59 IN | BODY MASS INDEX: 33.06 KG/M2 | WEIGHT: 164 LBS

## 2021-05-30 VITALS — WEIGHT: 162.4 LBS | BODY MASS INDEX: 33.36 KG/M2

## 2021-05-30 VITALS — BODY MASS INDEX: 33.9 KG/M2 | WEIGHT: 165 LBS

## 2021-05-31 VITALS — BODY MASS INDEX: 32.46 KG/M2 | WEIGHT: 161 LBS | HEIGHT: 59 IN

## 2021-05-31 NOTE — TELEPHONE ENCOUNTER
Refill Approved    Rx renewed per Medication Renewal Policy. Medication was last renewed on 2/28/19.5/31/19    Virginia Rosario, [facility] Connection Triage/Med Refill 8/30/2019     Requested Prescriptions   Pending Prescriptions Disp Refills     amLODIPine (NORVASC) 10 MG tablet [Pharmacy Med Name: AMLODIPINE 10MG TAB] 90 tablet 1     Sig: TAKE 1 TABLET BY MOUTH ONCE DAILY       Calcium-Channel Blockers Protocol Passed - 8/29/2019  9:35 AM        Passed - PCP or prescribing provider visit in past 12 months or next 3 months     Last office visit with prescriber/PCP: 10/16/2018 Abdulkadir Rodriguez MD OR same dept: 10/22/2018 Eda Valencia MD OR same specialty: 10/22/2018 Eda Valencia MD  Last physical: Visit date not found Last MTM visit: Visit date not found   Next visit within 3 mo: Visit date not found  Next physical within 3 mo: Visit date not found  Prescriber OR PCP: Abdulkadir Rodriguez MD  Last diagnosis associated with med order: 1. Essential hypertension  - amLODIPine (NORVASC) 10 MG tablet [Pharmacy Med Name: AMLODIPINE 10MG TAB]; TAKE 1 TABLET BY MOUTH ONCE DAILY  Dispense: 90 tablet; Refill: 1  - losartan-hydrochlorothiazide (HYZAAR) 100-25 mg per tablet [Pharmacy Med Name: LOSARTAN/HCTZ 100-25MG TAB]; TAKE 1 TABLET BY MOUTH ONCE DAILY  Dispense: 90 tablet; Refill: 0    If protocol passes may refill for 12 months if within 3 months of last provider visit (or a total of 15 months).             Passed - Blood pressure filed in past 12 months     BP Readings from Last 1 Encounters:   10/22/18 124/70             losartan-hydrochlorothiazide (HYZAAR) 100-25 mg per tablet [Pharmacy Med Name: LOSARTAN/HCTZ 100-25MG TAB] 90 tablet 0     Sig: TAKE 1 TABLET BY MOUTH ONCE DAILY       Diuretics/Combination Diuretics Refill Protocol  Passed - 8/29/2019  9:35 AM        Passed - Visit with PCP or prescribing provider visit in past 12 months     Last office visit with prescriber/PCP: 10/16/2018 Abdulkadir Rodriguez  MD David OR same dept: 10/22/2018 Eda Valencia MD OR same specialty: 10/22/2018 Eda Valencia MD  Last physical: Visit date not found Last MTM visit: Visit date not found   Next visit within 3 mo: Visit date not found  Next physical within 3 mo: Visit date not found  Prescriber OR PCP: Abdulkadir Rodriguez MD  Last diagnosis associated with med order: 1. Essential hypertension  - amLODIPine (NORVASC) 10 MG tablet [Pharmacy Med Name: AMLODIPINE 10MG TAB]; TAKE 1 TABLET BY MOUTH ONCE DAILY  Dispense: 90 tablet; Refill: 1  - losartan-hydrochlorothiazide (HYZAAR) 100-25 mg per tablet [Pharmacy Med Name: LOSARTAN/HCTZ 100-25MG TAB]; TAKE 1 TABLET BY MOUTH ONCE DAILY  Dispense: 90 tablet; Refill: 0    If protocol passes may refill for 12 months if within 3 months of last provider visit (or a total of 15 months).             Passed - Serum Potassium in past 12 months      Lab Results   Component Value Date    Potassium 3.8 10/02/2018             Passed - Serum Sodium in past 12 months      Lab Results   Component Value Date    Sodium 140 10/02/2018             Passed - Blood pressure on file in past 12 months     BP Readings from Last 1 Encounters:   10/22/18 124/70             Passed - Serum Creatinine in past 12 months      Creatinine   Date Value Ref Range Status   10/02/2018 1.24 (H) 0.60 - 1.10 mg/dL Final

## 2021-06-02 VITALS — WEIGHT: 157 LBS | BODY MASS INDEX: 32.25 KG/M2

## 2021-06-02 VITALS — BODY MASS INDEX: 32.41 KG/M2 | WEIGHT: 157.75 LBS

## 2021-06-02 VITALS — WEIGHT: 159.25 LBS | BODY MASS INDEX: 32.72 KG/M2

## 2021-06-02 VITALS — BODY MASS INDEX: 32.82 KG/M2 | WEIGHT: 159.75 LBS

## 2021-06-03 NOTE — TELEPHONE ENCOUNTER
Medication Question or Clarification  Who is calling: Pharmacy: [facility]  What medication are you calling about?: hyzaar  What dose do you take?: 100-25 mg  How often are you taking the medication?: daily  Who prescribed the medication?: Jennifer  What is your question/concern?: on backorder- please send 2 separate new rx  Pharmacy: [facility]  Okay to leave a detailed message?: Yes  Site CMT - Please call the pharmacy to obtain any additional needed information.

## 2021-06-03 NOTE — TELEPHONE ENCOUNTER
Refill NOT Given    Refill given per Policy, patient informed they are overdue for Labs and Office Visit & B/P  OV 10/16/18    Cristy Grey, [facility] Connection Triage/Med Refill 11/28/2019    Requested Prescriptions   Pending Prescriptions Disp Refills     losartan-hydrochlorothiazide (HYZAAR) 100-25 mg per tablet [Pharmacy Med Name: LOSARTAN/HCTZ 100-25MG TAB] 90 tablet 0     Sig: TAKE 1 TABLET BY MOUTH ONCE DAILY       Diuretics/Combination Diuretics Refill Protocol  Failed - 11/27/2019  9:26 AM        Failed - Visit with PCP or prescribing provider visit in past 12 months     Last office visit with prescriber/PCP: 10/16/2018 Abdulkadir Rodriguez MD OR same dept: Visit date not found OR same specialty: 10/22/2018 Eda Valencia MD  Last physical: Visit date not found Last MTM visit: Visit date not found   Next visit within 3 mo: Visit date not found  Next physical within 3 mo: Visit date not found  Prescriber OR PCP: Abdulkadir Rodriguez MD  Last diagnosis associated with med order: 1. Essential hypertension  - losartan-hydrochlorothiazide (HYZAAR) 100-25 mg per tablet [Pharmacy Med Name: LOSARTAN/HCTZ 100-25MG TAB]; TAKE 1 TABLET BY MOUTH ONCE DAILY  Dispense: 90 tablet; Refill: 0  - amLODIPine (NORVASC) 10 MG tablet [Pharmacy Med Name: AMLODIPINE 10MG TAB]; TAKE 1 TABLET BY MOUTH ONCE DAILY  Dispense: 90 tablet; Refill: 0    If protocol passes may refill for 12 months if within 3 months of last provider visit (or a total of 15 months).             Failed - Serum Potassium in past 12 months      No results found for: LN-POTASSIUM          Failed - Serum Sodium in past 12 months      No results found for: LN-SODIUM          Failed - Blood pressure on file in past 12 months     BP Readings from Last 1 Encounters:   10/22/18 124/70             Failed - Serum Creatinine in past 12 months      Creatinine   Date Value Ref Range Status   10/02/2018 1.24 (H) 0.60 - 1.10 mg/dL Final             amLODIPine (NORVASC)  10 MG tablet [Pharmacy Med Name: AMLODIPINE 10MG TAB] 90 tablet 0     Sig: TAKE 1 TABLET BY MOUTH ONCE DAILY       Calcium-Channel Blockers Protocol Failed - 11/27/2019  9:26 AM        Failed - PCP or prescribing provider visit in past 12 months or next 3 months     Last office visit with prescriber/PCP: 10/16/2018 Abdulkadir Rodriguez MD OR same dept: Visit date not found OR same specialty: 10/22/2018 Eda Valencia MD  Last physical: Visit date not found Last MTM visit: Visit date not found   Next visit within 3 mo: Visit date not found  Next physical within 3 mo: Visit date not found  Prescriber OR PCP: Abdulkadir Rodriguez MD  Last diagnosis associated with med order: 1. Essential hypertension  - losartan-hydrochlorothiazide (HYZAAR) 100-25 mg per tablet [Pharmacy Med Name: LOSARTAN/HCTZ 100-25MG TAB]; TAKE 1 TABLET BY MOUTH ONCE DAILY  Dispense: 90 tablet; Refill: 0  - amLODIPine (NORVASC) 10 MG tablet [Pharmacy Med Name: AMLODIPINE 10MG TAB]; TAKE 1 TABLET BY MOUTH ONCE DAILY  Dispense: 90 tablet; Refill: 0    If protocol passes may refill for 12 months if within 3 months of last provider visit (or a total of 15 months).             Failed - Blood pressure filed in past 12 months     BP Readings from Last 1 Encounters:   10/22/18 124/70

## 2021-06-04 ENCOUNTER — COMMUNICATION - HEALTHEAST (OUTPATIENT)
Dept: NURSING | Facility: CLINIC | Age: 86
End: 2021-06-04

## 2021-06-04 VITALS
OXYGEN SATURATION: 96 % | RESPIRATION RATE: 16 BRPM | HEIGHT: 58 IN | HEART RATE: 64 BPM | WEIGHT: 151 LBS | TEMPERATURE: 98.7 F | DIASTOLIC BLOOD PRESSURE: 58 MMHG | SYSTOLIC BLOOD PRESSURE: 115 MMHG | BODY MASS INDEX: 31.7 KG/M2

## 2021-06-04 VITALS
HEART RATE: 80 BPM | RESPIRATION RATE: 24 BRPM | WEIGHT: 155 LBS | BODY MASS INDEX: 32.54 KG/M2 | DIASTOLIC BLOOD PRESSURE: 54 MMHG | HEIGHT: 58 IN | SYSTOLIC BLOOD PRESSURE: 108 MMHG

## 2021-06-04 VITALS
WEIGHT: 148 LBS | SYSTOLIC BLOOD PRESSURE: 132 MMHG | HEART RATE: 71 BPM | RESPIRATION RATE: 15 BRPM | HEIGHT: 58 IN | BODY MASS INDEX: 31.07 KG/M2 | TEMPERATURE: 98.4 F | DIASTOLIC BLOOD PRESSURE: 73 MMHG

## 2021-06-05 VITALS
TEMPERATURE: 97.6 F | SYSTOLIC BLOOD PRESSURE: 180 MMHG | BODY MASS INDEX: 30.86 KG/M2 | WEIGHT: 147 LBS | HEIGHT: 58 IN | HEART RATE: 76 BPM | RESPIRATION RATE: 14 BRPM | DIASTOLIC BLOOD PRESSURE: 67 MMHG

## 2021-06-05 VITALS
HEIGHT: 58 IN | TEMPERATURE: 97.7 F | RESPIRATION RATE: 18 BRPM | DIASTOLIC BLOOD PRESSURE: 72 MMHG | SYSTOLIC BLOOD PRESSURE: 138 MMHG | WEIGHT: 149 LBS | BODY MASS INDEX: 31.28 KG/M2 | HEART RATE: 74 BPM

## 2021-06-05 VITALS
WEIGHT: 152 LBS | RESPIRATION RATE: 16 BRPM | TEMPERATURE: 97.4 F | BODY MASS INDEX: 31.91 KG/M2 | DIASTOLIC BLOOD PRESSURE: 74 MMHG | SYSTOLIC BLOOD PRESSURE: 147 MMHG | HEART RATE: 69 BPM | HEIGHT: 58 IN

## 2021-06-06 NOTE — PROGRESS NOTES
Assessment/ Plan  Problem List Items Addressed This Visit        Unprioritized    Nodule of finger of left hand - Primary     Volar aspect, seems to pop out just proximal to the PIP joint.  Came up overnight  Unclear what this is  No warmth, redness to suggest infection but with pain this is clearly on the differential  Synovial cyst also distinct possibility but it seemed to arise very quickly for this problem and does not transilluminate  Hematoma or other fluid collection possibility as well    Discussed option of referral to hand surgery/ultrasound/empiric treatment for infection and follow    Decided on the latter, will soak, splint at night, elevate, cephalexin, follow-up if not improving.  Discussed to contact us promptly should things worsen.         Relevant Medications    cephalexin (KEFLEX) 500 MG capsule        Subjective  CC:  Chief Complaint   Patient presents with     Hand Pain     left middle finger swollen     HPI:  Finger pain; third finger, left hand    Duration/ timing of onset: 3 days  Location of pain volar surface    Severity/ Quality: mod- severe  Modifying factors: Make it worse- moving it   Make it better- nothing  Stiffness/ difficulty moving? yes  History of finger problems/injury or previous work-up/ treatment?  No  Comments came on very quickly    BP Readings from Last 3 Encounters:   02/11/20 108/54   10/22/18 124/70   10/16/18 126/52         PFSH:  Patient Active Problem List   Diagnosis     Diverticulosis     Esophageal reflux     Osteoarthritis     Hyperlipidemia     Essential hypertension     Healthcare maintenance     Pes anserine bursitis     Nodule of finger of left hand     Current medications reviewed as follows:  Current Outpatient Medications on File Prior to Visit   Medication Sig     acetaminophen (TYLENOL) 325 MG tablet Take 650 mg by mouth every 6 (six) hours as needed for pain.     amLODIPine (NORVASC) 10 MG tablet TAKE 1 TABLET BY MOUTH ONCE DAILY      "hydroCHLOROthiazide (HYDRODIURIL) 25 MG tablet Take 1 tablet (25 mg total) by mouth daily.     losartan (COZAAR) 100 MG tablet Take 1 tablet (100 mg total) by mouth daily.     [DISCONTINUED] biotin 1 mg tablet Take 1,000 mcg by mouth daily.      [DISCONTINUED] diclofenac sodium (VOLTAREN) 1 % Gel Apply topically to left knee 4x/day as needed for pain     [DISCONTINUED] losartan-hydrochlorothiazide (HYZAAR) 100-25 mg per tablet TAKE 1 TABLET BY MOUTH ONCE DAILY     [DISCONTINUED] omeprazole (PRILOSEC OTC) 20 MG tablet Take 40 mg by mouth daily.     [DISCONTINUED] triamcinolone (KENALOG) 0.1 % cream Apply to rash twice daily.     No current facility-administered medications on file prior to visit.      Social History     Tobacco Use   Smoking Status Never Smoker   Smokeless Tobacco Never Used     Social History     Social History Narrative    Patient is  to Dom    Moved to [facility] in 2016     Patient Care Team:  Abdulkadir Rodriguez MD as PCP - General  Abdulkadir Rodriguez MD as Assigned PCP  ROS  Negative constitutional, rheumatic, skin      Objective  Physical Exam  Vitals:    02/11/20 0841   BP: 108/54   Patient Site: Left Arm   Patient Position: Sitting   Cuff Size: Adult Regular   Pulse: 80   Resp: 24   Weight: 155 lb (70.3 kg)   Height: 4' 10\" (1.473 m)     Body mass index is 32.4 kg/m .  IP joint with distinct area of swelling, induration, no redness or warmth, no significant pain on superficial palpation, some pain on deeper palpation.  In around this is normal.  Diagnostics:   None      Please note: Voice recognition software was used in this dictation.  It may therefore contain typographical errors.        "

## 2021-06-06 NOTE — PROGRESS NOTES
Preoperative Exam    Scheduled Procedure: exc left middle finger mass  Surgery Date:  03/12/2020  Surgery Location: [facility] [place] fax:[phone]    Surgeon:  Dr. Gage Kaplan    Assessment/Plan:     1. Preop examination  Low risk surgery, patient without risk factors.  No special recommendations except amlodipine and losartan with a sip of water on morning of surgery.  - Hemoglobin  - Potassium  - Electrocardiogram Perform - Clinic    2. 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     Subjective:      Laine Sharma is a 86 y.o. female who presents for a preoperative consultation.  86-year-old with recent development of nodule on needle finger of the left hand.  Presented rapidly in early February, moderate tenderness.  Treated initially for possible infection with cephalexin with no effect.    All other systems reviewed and are negative, other than those listed in the HPI.    Pertinent History  Do you have difficulty breathing or chest pain after walking up a flight of stairs: No  History of obstructive sleep apnea: No  Steroid use in the last 6 months: No  Frequent Aspirin/NSAID use: No  Prior Blood Transfusion: No  Prior Blood Transfusion Reaction: No  If for some reason prior to, during or after the procedure, if it is medically indicated, would you be willing to have a blood transfusion?:  There is no transfusion refusal.    Current Outpatient Medications   Medication Sig  Dispense Refill     acetaminophen (TYLENOL) 325 MG tablet Take 650 mg by mouth every 6 (six) hours as needed for pain.       amLODIPine (NORVASC) 10 MG tablet Take 1 tablet (10 mg total) by mouth daily. 90 tablet 3     hydroCHLOROthiazide (HYDRODIURIL) 25 MG tablet Take 1 tablet (25 mg total) by mouth daily. 30 tablet 3     losartan (COZAAR) 100 MG tablet Take 1 tablet (100 mg total) by mouth daily. 30 tablet 11     No current facility-administered medications for this visit.         Allergies   Allergen Reactions     Iodine        Patient Active Problem List   Diagnosis     Diverticulosis     Esophageal reflux     Osteoarthritis     Hyperlipidemia     Essential hypertension     Healthcare maintenance     Pes anserine bursitis     Nodule of finger of left hand       No past medical history on file.    Past Surgical History:   Procedure Laterality Date     [place] REMOVAL GALLBLADDER      Description: Cholecystectomy;  Recorded: 01/06/2009;     [place] TOTAL ABDOM HYSTERECTOMY      Description: Hysterectomy;  Recorded: 01/06/2009;       Social History     Socioeconomic History     Marital status:      Spouse name: Not on file     Number of children: Not on file     Years of education: Not on file     Highest education level: Not on file   Occupational History     Not on file   Social Needs     Financial resource strain: Not on file     Food insecurity:     Worry: Not on file     Inability: Not on file     Transportation needs:     Medical: Not on file     Non-medical: Not on file   Tobacco Use     Smoking status: Never Smoker     Smokeless tobacco: Never Used   Substance and Sexual Activity     Alcohol use: No     Drug use: Not on file     Sexual activity: Not on file   Lifestyle     Physical activity:     Days per week: Not on file     Minutes per session: Not on file     Stress: Not on file   Relationships     Social connections:     Talks on phone: Not on file     Gets together: Not on file     Attends Shinto  service: Not on file     Active member of club or organization: Not on file     Attends meetings of clubs or organizations: Not on file     Relationship status: Not on file     Intimate partner violence:     Fear of current or ex partner: Not on file     Emotionally abused: Not on file     Physically abused: Not on file     Forced sexual activity: Not on file   Other Topics Concern     Not on file   Social History Narrative    Patient is  to Dom    Moved to [facility] house in 2016       Objective:     There were no vitals filed for this visit.      Physical Exam:  Physical Exam  Gen- alert, oriented/ appropriately responsive  HEENT- normal cephalic, atraumatic.   Chest- Normal inspiration and expiration.    Clear to ascultation.    No chest wall deformity or scar.  CV- Heart regular rate and rhythm  normal tones, no murmurs   No gallops or rubs.  Ext- appear well perfused, no edema  Skin- warm and dry,   Nodule on finger as previously described  no visualized rash  Results for orders placed or performed in visit on 03/06/20   Hemoglobin   Result Value Ref Range    Hemoglobin 12.3 12.0 - 16.0 g/dL   Electrocardiogram Perform - Clinic   Result Value Ref Range    SYSTOLIC BLOOD PRESSURE      DIASTOLIC BLOOD PRESSURE      VENTRICULAR RATE 57 BPM    ATRIAL RATE 57 BPM    P-R INTERVAL 166 ms    QRS DURATION 84 ms    Q-T INTERVAL 454 ms    QTC CALCULATION (BEZET) 441 ms    P Axis 47 degrees    R AXIS -6 degrees    T AXIS 36 degrees    MUSE DIAGNOSIS       Sinus bradycardia  Otherwise normal ECG  When compared with ECG of 12-JUL-2004 04:27,  Premature supraventricular complexes are no longer Present       Personally reviewed EKG and agree  Note that patient is on a beta-blocker    Potassium is pending and will be forwarded  Immunization History   Administered Date(s) Administered     DT (pediatric) 10/01/1997     Influenza high dose,seasonal,PF, 65+ yrs 10/17/2016     Influenza, inj, historic,unspecified 10/06/2012,  09/21/2015, 09/19/2017     Pneumo Conj 13-V (2010&after) 10/17/2016     Pneumo Polysac 23-V 07/30/2004     Td, adult adsorbed, PF 10/02/2018     Td,adult,historic,unspecified 01/31/2008     Tdap 01/31/2008     ZOSTER, LIVE 01/19/2012           Electronically signed by Abdulkadir Rodriguez MD 03/06/20 1:24 PM

## 2021-06-06 NOTE — TELEPHONE ENCOUNTER
Who is calling:  Patient   Reason for Call:  Patient states her  have appointment today at 1 pm with Abdulkadir Abreu patient is coming with him she is questioning if provider can see her finger .? PCP prescribed medication for her finger it didn't do nothing requesting if provider can take a look .   Date of last appointment with primary care: 02/11/20  Okay to leave a detailed message: No

## 2021-06-09 NOTE — PROGRESS NOTES
Assessment/Plan:   Acute cystitis  Acute lumbar strain    Ice to low back every couple hours for a few days then may alternate with heat  Drink water  Cephalexin twice a day for 7 days  Urine culture pending - will call with result.    Follow up if worse or no better    Subjective:      Laine Sharma is a 83 y.o. female who presents with increased urinary frequency and urgency.  This started about 4 days ago, got a bit better and now is worse again.  No fever or chills.  Some bloating.  Mild constipation.  No vomiting or diarrhea.  She denies blood in the urine.  Has some pain with voiding, is only voiding small amounts.  Some right side low back pain - onset today when she crossed her right leg over left knee to put on her sock.  This has happened before and she usually tries to remember not to do that but forgot this morning.  She took tylenol and it is already feeling better. No rash.      Allergies   Allergen Reactions     Iodine      Current Outpatient Prescriptions on File Prior to Visit   Medication Sig Dispense Refill     acetaminophen (TYLENOL) 325 MG tablet Take 650 mg by mouth every 6 (six) hours as needed for pain.       amLODIPine (NORVASC) 10 MG tablet Take 1 tablet (10 mg total) by mouth daily. 90 tablet 3     aspirin 325 MG tablet Take 325 mg by mouth every 6 (six) hours as needed for pain.       biotin 1 mg tablet Take 1,000 mcg by mouth daily.        bisoprolol (ZEBETA) 5 MG tablet Take 1 tablet (5 mg total) by mouth daily. 90 tablet 3     omeprazole (PRILOSEC OTC) 20 MG tablet Take 40 mg by mouth daily.       valsartan-hydrochlorothiazide (DIOVAN-HCT) 320-25 mg per tablet Take 1 tablet by mouth daily. 90 tablet 3     amoxicillin (AMOXIL) 500 MG capsule Take 1 capsule (500 mg total) by mouth 3 (three) times a day. 30 capsule 0     triamcinolone (KENALOG) 0.1 % cream Apply to rash twice daily. 30 g 6     No current facility-administered medications on file prior to visit.      Patient Active Problem  List   Diagnosis     Diverticulosis     Esophageal reflux     Osteoarthritis     Hyperlipidemia     Essential hypertension       Objective:     Visit Vitals     /60     Pulse (!) 54     Temp 98  F (36.7  C) (Oral)     Resp 14     Wt 165 lb (74.8 kg)     SpO2 96%     Breastfeeding No     BMI 33.9 kg/m2       Physical  General Appearance: Alert, cooperative, no distress  Head: Normocephalic, without obvious abnormality, atraumatic  Eyes: Conjunctivae are normal.  Lungs: Clear to auscultation bilaterally, respirations unlabored  Heart: Regular rate and rhythm  Abdomen: Soft, non-tender, no masses, no organomegaly, no CVA tenderness with percussion.  There is soreness and muscle spasm low lumbar area on the right side.  Negative SLR.  Gait normal.   Skin: no rashes or lesions  Psychiatric: Patient has a normal mood and affect.        Recent Results (from the past 24 hour(s))   Urinalysis-UC if Indicated   Result Value Ref Range    Color, UA Yellow Colorless, Yellow, Straw, Light Yellow    Clarity, UA Slightly Cloudy (!) Clear    Glucose, UA Negative Negative    Bilirubin, UA Negative Negative    Ketones, UA Negative Negative    Specific Gravity, UA 1.015 1.005 - 1.030    Blood, UA Trace (!) Negative    pH, UA 6.5 5.0 - 8.0    Protein, UA Negative Negative mg/dL    Urobilinogen, UA 0.2 E.U./dL 0.2 E.U./dL, 1.0 E.U./dL    Nitrite, UA Positive (!) Negative    Leukocytes, UA Moderate (!) Negative    Bacteria, UA Few (!) None Seen hpf    RBC, UA 0-2 None Seen, 0-2 hpf    WBC, UA 25-50 (!) None Seen, 0-5 hpf    Squam Epithel, UA 5-10 (!) None Seen, 0-5 lpf

## 2021-06-10 ENCOUNTER — COMMUNICATION - HEALTHEAST (OUTPATIENT)
Dept: CARE COORDINATION | Facility: CLINIC | Age: 86
End: 2021-06-10

## 2021-06-10 NOTE — TELEPHONE ENCOUNTER
Called and spoke to patient and schedule future visit    ----- Message -----  From: Sathish Brown MD  Sent: 6/9/2020   1:05 PM CDT  To: [facility] Family [facility]/Ob Support Pool    Please set up Annual Wellness visit virtual visit.

## 2021-06-10 NOTE — PROGRESS NOTES
Subjective:      Patient ID: Laine Sharma is a 83 y.o. female.    Chief Complaint:    HPI Laine Sharma is a 83 y.o. female who presents today complaining of rash x 1 day. Patient woke up this morning with a rash on her lower legs.  The rash does not itch, she has not tried any new lotions, soaps, or other products.  She has not been walking through any tall grasses.  She has never had a rash like this before.  She is not currently taking any blood thinners other than aspirin.  She is not having any other sick symptoms such as fever, cough, or runny nose.  She denies any weakness, tingling, or headaches.  He had a UTI a few months ago, however she has not had any new medications since then.  She has never had any problems with her liver, she does not drink, nor does she have a history of hepatitis.        Past Surgical History:   Procedure Laterality Date     [place] REMOVAL GALLBLADDER      Description: Cholecystectomy;  Recorded: 01/06/2009;     [place] TOTAL ABDOM HYSTERECTOMY      Description: Hysterectomy;  Recorded: 01/06/2009;       No family history on file.    Social History   Substance Use Topics     Smoking status: Never Smoker     Smokeless tobacco: Never Used     Alcohol use No       Review of Systems   Constitutional: Negative for chills, fatigue and fever.   HENT: Negative for congestion and sore throat.    Respiratory: Negative for cough and shortness of breath.    Musculoskeletal: Negative for myalgias.   Skin: Positive for rash. Negative for wound.   Neurological: Negative for weakness, numbness and headaches.       Objective:     /60  Pulse 60  Temp 98.4  F (36.9  C) (Oral)   Resp 16  Wt 162 lb 6.4 oz (73.7 kg)  SpO2 95%  BMI 33.36 kg/m2    Physical Exam   Constitutional: She appears well-developed and well-nourished. No distress.   HENT:   Head: Normocephalic and atraumatic.   Right Ear: External ear normal.   Left Ear: External ear normal.   Mouth/Throat: Oropharynx is clear and moist. No  oropharyngeal exudate.   Eyes: Conjunctivae and EOM are normal. Pupils are equal, round, and reactive to light.   Neck: Normal range of motion. Neck supple.   Cardiovascular: Normal rate, regular rhythm and normal heart sounds.  Exam reveals no gallop and no friction rub.    No murmur heard.  Pulmonary/Chest: Effort normal and breath sounds normal. No respiratory distress. She has no wheezes. She has no rales.   Abdominal: Soft. Bowel sounds are normal. She exhibits no distension and no mass. There is no hepatosplenomegaly. There is no tenderness. There is no rebound and no guarding.   Lymphadenopathy:     She has no cervical adenopathy.   Neurological: She is alert.   Skin: Skin is warm and dry. Petechiae noted. No bruising and no purpura noted. She is not diaphoretic. No erythema.   Nonraised nonblanching petechial rash noted on  bilateral lower extremities below the knee.  No excoriations noted   Psychiatric: She has a normal mood and affect. Her behavior is normal. Judgment and thought content normal.     Recent Results (from the past 24 hour(s))   HM1 (CBC with Diff)   Result Value Ref Range    WBC 6.8 4.0 - 11.0 thou/uL    RBC 3.73 (L) 3.80 - 5.40 mill/uL    Hemoglobin 11.8 (L) 12.0 - 16.0 g/dL    Hematocrit 33.6 (L) 35.0 - 47.0 %    MCV 90 80 - 100 fL    MCH 31.5 27.0 - 34.0 pg    MCHC 35.0 32.0 - 36.0 g/dL    RDW 12.2 11.0 - 14.5 %    Platelets 184 140 - 440 thou/uL    MPV 6.3 (L) 7.0 - 10.0 fL    Neutrophils % 57 50 - 70 %    Lymphocytes % 25 20 - 40 %    Monocytes % 11 (H) 2 - 10 %    Eosinophils % 7 (H) 0 - 6 %    Basophils % 1 0 - 2 %    Neutrophils Absolute 3.9 2.0 - 7.7 thou/uL    Lymphocytes Absolute 1.7 0.8 - 4.4 thou/uL    Monocytes Absolute 0.7 0.0 - 0.9 thou/uL    Eosinophils Absolute 0.5 (H) 0.0 - 0.4 thou/uL    Basophils Absolute 0.0 0.0 - 0.2 thou/uL     INR and PT/PTT still pending.    Procedures  Considering the patient is otherwise well and healthy, she has normal vital signs, and no other  symptoms her petechia may possibly be idiopathic.  Her white count shows no signs of infection.  Her platelet levels are normal.  Her INR and PT/PTT are still pending, but I believe that they are most likely going to be normal considering she is not currently on any blood thinners.  She had no other signs of bleeding.  I recommend that she follow-up with her primary care provider.  I informed the patient that it is unlikely that her rash will just disappear, it will likely take the same amount of time as a bruise takes to heal.  If the patient's rash is still present when she follows up with primary, she may require a biopsy of the lesion.    At the end of the encounter, I discussed results, diagnosis, medications. Discussed red flags for immediate return to clinic/ER, as well as indications for follow up if no improvement. Patient  understood and agreed to plan. Patient was stable for discharge.      Assessment / Plan:     1. Petechial rash  INR    HM1(CBC and Differential)    APTT(PTT)    HM1 (CBC with Diff)         Patient Instructions   1) You will be notified of the rest of your lab results.  2) Follow up with primary care in about 1 week to revaluate the rash   3) Follow up sooner if you start having other sick symptoms.

## 2021-06-10 NOTE — PROGRESS NOTES
Assessment/ Plan  1. Venous stasis  Prescription for knee-high Jobst stockings, 20-30 mmHg.    2. Essential hypertension with goal blood pressure less than 140/90  At goal, continue high-dose Diovan/HCT, amlodipine 10 and 5 mg bisoprolol  - Basic Metabolic Panel    3. Anemia  Very mild, suspect this may be due to age, chronic disease or renal issues.  Check ferritin  - HM2(CBC w/o Differential)  - Ferritin    4. Back pain  Latissimus dorsi on right side with specific movement.  Recommend time, Tylenol, stretching, ice or heat.  Consider physical therapy in the future.    Body mass index is 33.69 kg/(m^2).    Subjective  CC:  Chief Complaint   Patient presents with     Follow-up     Flank Pain     right side, goes down to right leg.      HPI:    Here for follow-up on rash, lower extremities.  Patient was seen 4/12 in the walk-in clinic.  Petechial rash and lower extremities, non-itching.  Patient has a history of lower extremity edema.      Back pain  Narrative R flank pain  ---------------------  Duration/timing- happens after putting on sock, crossing legs- only after crossing legs has been going on about 2 months  Location/ radiation- R flank  Quality/Severity-very sharp, severe, only after crossing leg and bending over to put on socks  Context/modifying factors-otherwise does not hurt  Red flags- progressive weakness, weight loss/ fever, night-time awakening?- no  Back pain history  previous evaluation, treatment, imaging? -,  No problem  Comments-no previous history of back problems    HTN  Narrative/ comments: Last visit 12/22/16, we cut back on bisoprolol from 10 mg to 5 mg because she was having some shortness of breath.  Patient reports this is helped.  Antihypertensive meds: Amlodipine 10 mg, Diovan/HCT, bisoprolol 5 mg  Does patient check blood pressure on his/her own?  Occasionally, has been good  Low salt, plenty of fruits and vegetables in diet?  Yes  Exercise?  No  Alcohol?  No  Meds that may raise BP  (NSAIDs, decongestants etc?  No    Patient also notes that she had slightly low hemoglobin on visit in urgent care.  11.8.  Normocytic indices.  -    PFSH:  Current medications reviewed as follows:  Current Outpatient Prescriptions on File Prior to Visit   Medication Sig     amLODIPine (NORVASC) 10 MG tablet Take 1 tablet (10 mg total) by mouth daily.     biotin 1 mg tablet Take 1,000 mcg by mouth daily.      bisoprolol (ZEBETA) 5 MG tablet Take 1 tablet (5 mg total) by mouth daily.     omeprazole (PRILOSEC OTC) 20 MG tablet Take 40 mg by mouth daily.     valsartan-hydrochlorothiazide (DIOVAN-HCT) 320-25 mg per tablet Take 1 tablet by mouth daily.     acetaminophen (TYLENOL) 325 MG tablet Take 650 mg by mouth every 6 (six) hours as needed for pain.     amoxicillin (AMOXIL) 500 MG capsule Take 1 capsule (500 mg total) by mouth 3 (three) times a day.     aspirin 325 MG tablet Take 325 mg by mouth every 6 (six) hours as needed for pain.     triamcinolone (KENALOG) 0.1 % cream Apply to rash twice daily.     No current facility-administered medications on file prior to visit.      Patient Active Problem List    Diagnosis Date Noted     Hyperlipidemia      Priority: High     Overview Note:     Created by Conversion         Essential hypertension      Priority: High     Overview Note:     Created by Conversion    Replacement Utility updated for latest IMO load       Esophageal reflux      Priority: Medium     Overview Note:     Created by Conversion         Osteoarthritis      Priority: Medium     Overview Note:     Created by Conversion    Replacement Utility updated for latest IMO load       Diverticulosis      Overview Note:     Created by Conversion  [facility] Annotation: May  5 2010  8:50Abdulkadir Miller: colonoscopy    Replacement Utility updated for latest IMO load       History   Smoking Status     Never Smoker   Smokeless Tobacco     Never Used     Social History     Social History Narrative    Patient is  " to Dom    Moved to [facility] house in 2016         Patient Care Team:  Abdulkadir Rodriguez MD as PCP - General  ROS  Full 10 system review including constitutional, respiratory, cardiac, gi, urinary, rheumatologic, neurologic, reproductive, dermatologic psychiatric is  performed (via questionnaire) and is negative      Objective  Physical Exam  Vitals:    04/19/17 1556   BP: 122/56   Patient Site: Left Arm   Patient Position: Sitting   Cuff Size: Adult Regular   Pulse: 72   Resp: 20   Temp: 98.7  F (37.1  C)   TempSrc: Oral   Weight: 164 lb (74.4 kg)   Height: 4' 10.5\" (1.486 m)     Pleasant 83-year-old no distress  Gen- alert, oriented/ appropriately responsive  HEENT- normal cephalic, atraumatic.   Chest- Normal inspiration and expiration.  Clear to ascultation.  No chest wall deformity or scar.  CV- Heart regular rate and rhythm, normal tones, no murmurs gallops or rubs.  Ext- appear well perfused, 1+ edema  Skin- warm and dry, symmetric petechial rash, anterior and medial shins lower extremities.  Consistent with venous stasis change.    Diagnostics  Results for orders placed or performed in visit on 04/19/17   HM2(CBC w/o Differential)   Result Value Ref Range    WBC 7.3 4.0 - 11.0 thou/uL    RBC 3.93 3.80 - 5.40 mill/uL    Hemoglobin 12.1 12.0 - 16.0 g/dL    Hematocrit 35.9 35.0 - 47.0 %    MCV 91 80 - 100 fL    MCH 30.9 27.0 - 34.0 pg    MCHC 33.8 32.0 - 36.0 g/dL    RDW 11.6 11.0 - 14.5 %    Platelets 262 140 - 440 thou/uL    MPV 6.7 (L) 7.0 - 10.0 fL         Please note: Voice recognition software was used in this dictation.  It may therefore contain typographical errors.    "

## 2021-06-11 ENCOUNTER — OFFICE VISIT - HEALTHEAST (OUTPATIENT)
Dept: BEHAVIORAL HEALTH | Facility: HOSPITAL | Age: 86
End: 2021-06-11

## 2021-06-11 DIAGNOSIS — F43.21 GRIEF REACTION: ICD-10-CM

## 2021-06-11 DIAGNOSIS — F43.22 ADJUSTMENT DISORDER WITH ANXIETY: ICD-10-CM

## 2021-06-11 ASSESSMENT — ANXIETY QUESTIONNAIRES
2. NOT BEING ABLE TO STOP OR CONTROL WORRYING: NOT AT ALL
1. FEELING NERVOUS, ANXIOUS, OR ON EDGE: NOT AT ALL
7. FEELING AFRAID AS IF SOMETHING AWFUL MIGHT HAPPEN: NOT AT ALL
3. WORRYING TOO MUCH ABOUT DIFFERENT THINGS: NOT AT ALL
GAD7 TOTAL SCORE: 0
5. BEING SO RESTLESS THAT IT IS HARD TO SIT STILL: NOT AT ALL
IF YOU CHECKED OFF ANY PROBLEMS ON THIS QUESTIONNAIRE, HOW DIFFICULT HAVE THESE PROBLEMS MADE IT FOR YOU TO DO YOUR WORK, TAKE CARE OF THINGS AT HOME, OR GET ALONG WITH OTHER PEOPLE: NOT DIFFICULT AT ALL
6. BECOMING EASILY ANNOYED OR IRRITABLE: NOT AT ALL
4. TROUBLE RELAXING: NOT AT ALL

## 2021-06-11 ASSESSMENT — PATIENT HEALTH QUESTIONNAIRE - PHQ9: SUM OF ALL RESPONSES TO PHQ QUESTIONS 1-9: 0

## 2021-06-11 NOTE — PROGRESS NOTES
"Mental Health tele Visit Note    Patient: Laine Sharma    : 1933 MRN: [id number]    Date: 10/01/2020 Start time: 8:04  Stop Time: 8:55   Intake Session # 2    The patient has been notified of the following:   \"We have found that certain health care needs can be provided without the need for a face to face visit.  This service lets us provide the care you need with a phone conversation.  I will have full access to your [facility] medical record during this entire phone call.   I will be taking notes for your medical record. Since this is like an office visit, we will bill your insurance company for this service.  There are potential benefits and risks of telephone visits (e.g. limits to patient confidentiality) that differ from in-person visits.?  Confidentiality still applies for telephone services, and nobody will record the visit.  It is important to be in a quiet, private space that is free of distractions (including cell phone or other devices) during the visit.?? If during the course of the call I believe a telephone visit is not appropriate, you will not be charged for this service\"  Consent has been obtained for this service by care team member: Yes, per verbal agreement   Session Type: Patient is participating in a telemedicine phone visit: No device for video visit    Chief Complaint   Patient presents with     Assessment session     Telephone visit for mental health      New symptoms or complaints: Patient returned for her second assessment session. She reported with a diagnosis of grief following 's removal from home a memory care where he is being receiving care.     Functional Impairment:   Personal: 3  Family: 3  Work: 3  Social:3        ASSESSMENT: Current Emotional / Mental Status (status of significant symptoms):              Risk status (Self / Other harm or suicidal ideation)              Patient denied personal safety              Patient denies current or recent suicidal ideation or " behaviors.              Patient denies current or recent homicidal ideation or behaviors.              Patient denies current or recent self injurious behavior or ideation.              Patient denies other safety concerns.              Patient denied a change in risk factors              Patient denied the change in the protective factors              Recommended that patient call 911 or go to the local ED should there be a change in any of these risk factors.                Attitude:                                   Cooperative               Orientation:                             3X              Speech                          Rate / Production:       Normal/ Responsive                          Volume:                       Normal               Mood:                                      Anxious  crying all the time              Thought Content:                    Clear               Thought Form:                        Coherent  Logical               Insight:                                     Good     Patient's impression of their current status: Patient shared ongoing sadness about 's memory. Shared she is much more worried about him than herself. Felt bad when she visited him and found out he starts to forget who she is. Mentioned about their wedding anniversary coming up on 10/7 but he could not remember;seemed like he was being informed about something new. This has been increased tears in patient. Reports she tries to keep all the pain inside when she is with him and explode after she leaves the memory care facility. Family continues to support her. Patient provided information needed around family/social history for her diagnostic assessment. She has one more session to complete this process. She denied any safety concern today.     Therapist impression of patients current state: Writer called the patient for her second assessment over the phone. Assessed for safety. Offered empathy about  reported feelings related to 's illness. Completed the section related to her family/social history. Encouraged the patient to keep in touch with the family as they are a big support. Patient remains determined to process her feelings and learn how to cope with current stress. She will benefit from active listening, empathy and validation as appropriate. She will also be able to challenge any negative thoughts will continue to process her grief.     Type of psychotherapeutic technique provided: Client centered and , rapport building, assessment    Progress toward short term goals: continues to experience sadeness and cries due to thinking about 's situation    Review of long term goals: Not done at today's visit; will be developed once the DA is completed     Diagnosis:  1. Grief reaction      Plan and Follow up: Patient will keep in touch with family. Will visit her  weekly as allowed. Will keep up with self care( talk walks, get some sleep, keep up with her good diet as well as personal care). Patient will get her next phone visit in a week.     Discharge Criteria/Planning: patient will complete her assessement before a treatment plan can be developed    I have reviewed the note as documented above.  This accurately captures the substance of my conversation with the patient.  As the provider I attest to compliance with applicable laws and regulations related to telemedicine.  Performed and documented by CHASITY Newell- JOAQUIM; [facility] 10/2/2020

## 2021-06-11 NOTE — PROGRESS NOTES
"Mental Health tele Visit Note    Patient: Laine Sharma    : 1933 MRN: [id number]    Date:   Start time: 8:05  Stop Time: 8:55  Assessment Session # 1    The patient has been notified of the following:   \"We have found that certain health care needs can be provided without the need for a face to face visit.  This service lets us provide the care you need with a phone conversation.  I will have full access to your [facility] medical record during this entire phone call.   I will be taking notes for your medical record. Since this is like an office visit, we will bill your insurance company for this service.  There are potential benefits and risks of telephone visits (e.g. limits to patient confidentiality) that differ from in-person visits.?  Confidentiality still applies for telephone services, and nobody will record the visit.  It is important to be in a quiet, private space that is free of distractions (including cell phone or other devices) during the visit.?? If during the course of the call I believe a telephone visit is not appropriate, you will not be charged for this service\"  Consent has been obtained for this service by care team member: Yes, per verbal agreement   Session Type: Patient is participating in a telemedicine phone visit: No device for Video visit    Chief Complaint   Patient presents with     Assessment visit 1     Telephone visit for mental health      New symptoms or complaints: Patient presented on time to this mental health clinic having been referred to this provider by Abdulkadir Rodriguez MD with [facility] for a psychotherapy. She presented with Grief Reaction.  A diagnostic assessment was initiated with patient cooperation.  AIDET was performed.  Patient and writer completed the necessary paper work for today's visit. These include PHQ 9; CHAU 7, CAGE AID, C-SSRS, and WHODAS 2.0.  Additional information is needed to complete the DA. Patient  Was " explained that the process with take up to 3 sessions. She was scheduled for a second diagnostic assessment encounter  for 10/01.     Functional Impairment:   Personal: 4  Family: 4  Work: 0  Social:4        ASSESSMENT: Current Emotional / Mental Status (status of significant symptoms):              Risk status (Self / Other harm or suicidal ideation)              Patient denied a personal safety              Patient denies current or recent suicidal ideation or behaviors.              Patient denies current or recent homicidal ideation or behaviors.              Patient denies current or recent self injurious behavior or ideation.              Patient denies other safety concerns.              Patient denied change in risk factors              Patient denied the change in the protective factors              Recommended that patient call 911 or go to the local ED should there be a change in any of these risk factors.                Attitude:                                   Cooperative               Orientation:                             3X              Speech                          Rate / Production:       Normal/ Responsive                          Volume:                       Normal               Mood:                                      Anxious  Sad  tearful               Thought Content:                    Clear               Thought Form:                        Coherent  Logical               Insight:                                     Good     Patient's impression of their current status:Frank reported that on 8/03, her  woke up in the middle of night grabbed the table lamp and left the house. Patient then called son who found him down street.  was sent to memory care. She expressed sadness about his memory loss. She notes they are very close and talk about everything but every time she brings up the incident when he left home at night, he denies it. She notes this brings sadness and  tears. Has been restraining herself from crying when she is with  who is currently in a memory care facility. She is allowed to visit him once a week due to COVID19.  She shared that she has been crying a lot especially during night.Shared she sleeps okay but has to wait until Midnight so she can be exhausted enough. This is a change for her. Has noted some decreased appetite but forces herself to eat. Has a strong support network: 2 sisters-in- law who lives near by check on her regularly, at least 3 times a week. The three plan to get together soon. Own brother lives at [facility] but talk more often. Step son also talks to her. She has been in touch with her  and she prays for her  and herself. Patient has a cat that she enjoys having around when she feels alone. She also watches TV at night. Patient's expectation is to be able to talk to a professional and learn how to cope. She opted for a weekly visit for now.     Therapist impression of patients current state: Patient was self determined to have her therapy sessions and learn how to cope with presented issues. She will do well with active listening, empathy, validation of her feelings as appropriate and some options to challenge any negative thoughts related to her separation from  and his illness.     Type of psychotherapeutic technique provided: Client centered and rapport building, assessment PHQ9:1; CHAU 7: 7; CAGE AID: 0/4; WHODAS 2.0:25 %; C-SSRS: 0    Progress toward short term goals: Unable to assess at this very first intake session     Review of long term goals: Not done at today's visit  Diagnosis:  1. Adjustment disorder, unspecified type      Plan and Follow up: Patient will keep up with current social and family connection. Patient's next telephone visit is in a week. Will continue to visit her  as permitted.     Discharge Criteria/Planning: Patient will continue with follow-up until therapy can be discontinued  without return of signs and symptoms.     I have reviewed the note as documented above.  This accurately captures the substance of my conversation with the patient.  As the provider I attest to compliance with applicable laws and regulations related to telemedicine.  Performed and documented by CHASITY Newell- [facility]; [facility] 9/22/2020

## 2021-06-11 NOTE — PROGRESS NOTES
I spent over 15 minutes spent, greater than 50% of this counseling regarding following issues:      Problem List Items Addressed This Visit        Unprioritized    Adjustment disorder with depressed mood - Primary      placed in memory care facility 2 months ago, now lonely and sad.  Seeing therapist, staying active  Problems getting somewhat better, 2 nights per week, wakes up for 3 hours in the middle of the night and cannot sleep, otherwise better.    Decided against medication at this point, continue with therapist.  Follow-up to discuss 6 weeks               Pt presented for:  Chief Complaint   Patient presents with     Follow-up     As described above.  Patient able to visit her , doing so once a week.  Would do it more, except at the long distance drive and she does not feel comfortable doing that alone.  Remains active with her Mos[employer],  keeps in touch.  Some conflict with her brother and his wife makes her less likely to visit him in [place] this year.

## 2021-06-11 NOTE — PROGRESS NOTES
9/8/2020  Patient enrolled in [facility] as of 9-8-20.  Reviewed assessment, goals, and any delegations from [facility] RN/CCC SW    CHW follow up: 9-23-20

## 2021-06-11 NOTE — PROGRESS NOTES
Assessment and Plan:       Problem List Items Addressed This Visit        High    Essential hypertension     Amlodipine 10, losartan/hydrochlorthiazide 100/25, blood pressure well controlled.  No change, check BMP         Relevant Orders    Basic Metabolic Panel    Hemoglobin (Completed)       Unprioritized    Healthcare maintenance - Primary     DEXA scan, flu, advanced directives completed  Encourage regular physical activity         Relevant Orders    DXA Bone Density Scan    Grief reaction     3 weeks ago, helped place her  in a care facility for dementia since things were not manageable at home.  She is now very sad and lonely.  Tearful, not sleeping at night well, difficulty enjoying things during the day, not suicidal.    She has numerous social outlets, largely through her [employer], however she has not been reaching out to them.    Also, she has a son who is in town who she sees regularly but he is going to be going to [place] in October    Recommend therapist, referral made  Recommend continuing social activities even if she does not feel like it  Recommend physical activity    I was asked about possibility medication.  Decided against this at this early stage, this is normal grief response.  If problems persist or worsen it for the next 6 weeks, recommend that they follow-up.         Relevant Orders    AMB REFERRAL TO MENTAL HEALTH AND ADDICTION  - Adult (18+); Outpatient Treatment; Individual/Couples/Family/Group Therapy/Health Psychology; [facility] [phone]    Asymptomatic menopausal state      DEXA scan         Relevant Orders    DXA Bone Density Scan      Other Visit Diagnoses     Encounter for immunization         Relevant Orders    Influenza,Quad,High Dose,PF 65 YR+ (Completed)            The patient's current medical problems were reviewed.    I have had an Advance Directives discussion with the patient.  The following health maintenance schedule was reviewed  with the patient and provided in printed form in the after visit summary:   Health Maintenance   Topic Date Due     DXA SCAN  07/06/1998     ZOSTER VACCINES (2 of 3) 03/15/2012     INFLUENZA VACCINE RULE BASED (1) 08/01/2020     FALL RISK ASSESSMENT  02/11/2021     MEDICARE ANNUAL WELLNESS VISIT  09/03/2021     ADVANCE CARE PLANNING  10/09/2023     TD 18+ HE  10/02/2028     PNEUMOCOCCAL IMMUNIZATION 65+ LOW/MEDIUM RISK  Completed     HEPATITIS B VACCINES  Aged Out        Subjective:   Chief Complaint: Laine Sharma is an 87 y.o. female here for an Annual Wellness visit.   HPI: See discussion above, primarily spoke about mood/loneliness, recent placement of  in a care facility    Review of Systems:    Please see above.  The rest of the review of systems are negative for all systems.    Patient Care Team:  Abdulkadir Rodriguez MD as PCP - General  Abdulkadir Rodriguez MD as Assigned PCP     Patient Active Problem List   Diagnosis     Diverticulosis     Esophageal reflux     Osteoarthritis     Hyperlipidemia     Essential hypertension     Healthcare maintenance     Pes anserine bursitis     Nodule of finger of left hand     Grief reaction     Asymptomatic menopausal state      No past medical history on file.   Past Surgical History:   Procedure Laterality Date     APPENDECTOMY       AL REMOVAL GALLBLADDER      Description: Cholecystectomy;  Recorded: 01/06/2009;     AL TOTAL ABDOM HYSTERECTOMY      Description: Hysterectomy;  Recorded: 01/06/2009;      No family history on file.   Social History     Socioeconomic History     Marital status:      Spouse name: Not on file     Number of children: Not on file     Years of education: Not on file     Highest education level: Not on file   Occupational History     Not on file   Social Needs     Financial resource strain: Not on file     Food insecurity     Worry: Not on file     Inability: Not on file     Transportation needs     Medical: Not on file      "Non-medical: Not on file   Tobacco Use     Smoking status: Never Smoker     Smokeless tobacco: Never Used   Substance and Sexual Activity     Alcohol use: No     Drug use: Not on file     Sexual activity: Not on file   Lifestyle     Physical activity     Days per week: Not on file     Minutes per session: Not on file     Stress: Not on file   Relationships     Social connections     Talks on phone: Not on file     Gets together: Not on file     Attends [employer] service: Not on file     Active member of club or organization: Not on file     Attends meetings of clubs or organizations: Not on file     Relationship status: Not on file     Intimate partner violence     Fear of current or ex partner: Not on file     Emotionally abused: Not on file     Physically abused: Not on file     Forced sexual activity: Not on file   Other Topics Concern     Not on file   Social History Narrative    Patient is  to Dom    Moved to [facility] in 2016      Current Outpatient Medications   Medication Sig Dispense Refill     acetaminophen (TYLENOL) 325 MG tablet Take 650 mg by mouth every 6 (six) hours as needed for pain.       amLODIPine (NORVASC) 10 MG tablet Take 1 tablet (10 mg total) by mouth daily. 90 tablet 3     hydroCHLOROthiazide (HYDRODIURIL) 25 MG tablet Take 1 tablet (25 mg total) by mouth daily. 30 tablet 11     losartan (COZAAR) 100 MG tablet Take 1 tablet (100 mg total) by mouth daily. 30 tablet 11     No current facility-administered medications for this visit.       Objective:   Vital Signs:   Visit Vitals  /73 (Patient Site: Right Arm, Patient Position: Sitting, Cuff Size: Adult Regular)   Pulse 71   Temp 98.4  F (36.9  C) (Oral)   Resp 15   Ht 4' 10\" (1.473 m)   Wt 148 lb (67.1 kg)   BMI 30.93 kg/m           VisionScreening:  No exam data present     PHYSICAL EXAM  Gen- alert, oriented/ appropriately responsive  HEENT- normal cephalic, atraumatic.   Chest- Normal inspiration and expiration.    Clear to " ascultation.    No chest wall deformity or scar.  CV- Heart regular rate and rhythm  normal tones, no murmurs   No gallops or rubs.  Ext- appear well perfused, no edema  Skin- warm and dry,   no visualized rash      Assessment Results 9/3/2020   Activities of Daily Living No help needed   Instrumental Activities of Daily Living No help needed   Mini Cog Total Score 3   Some recent data might be hidden     A Mini-Cog score of 0-2 suggests the possibility of dementia, score of 3-5 suggests no dementia      Identified Health Risks:     She is at risk for lack of exercise and has been provided with information to increase physical activity for the benefit of her well-being.  The patient was counseled and encouraged to consider modifying their diet and eating habits. She was provided with information on recommended healthy diet options.  Information on urinary incontinence and treatment options given to patient.  Patient's advanced directive was discussed and I am comfortable with the patient's wishes.

## 2021-06-11 NOTE — PROGRESS NOTES
9/23/2020   Clinic Care Coordination Contact    Community Health Worker Follow Up    Goals:   Goals Addressed                 This Visit's Progress       Patient Stated      Mental Health Management (pt-stated)        Goal Statement: I want to be aware of support groups in my area (grief,  recently in memory care) within 1-2 months  Date Goal set: 09/08/20  Barriers: Unsure of resources  Strengths: Children support  Date to Achieve By: 11/08/2020  Patient expressed understanding of goal: yes  Action steps to achieve this goal:  1. I will wait to get the resources from   2. I will do puzzles to keep busy.  3. I will meet with friend today for lunch.  4. I will meet with my stepson tomorrow night to connect with my  through [employer].   5. I will see Dr Rodriguez this Friday 9-25-20 at 1:20pm for follow up.  6. I will update CCC team    Updated: 9-23-20 AL          Called and spoke to patient and follow up on goal.  Patient reported:  -doing okay.   -will be meeting with lady friend today for lunch  -stepson will be over at the house tomorrow night to connect with  through [employer].  Patient expressed feeling emotional staying at home would to go to adult day group but the places are not open due to COVID.    Engaged in conversation with patient of things to do to keep busy and distract from feeling isolated.  Patient been doing puzzles and watch sports on TV.  She will look for some more puzzles to do.  Has appt with PCP this Friday at 1:20pm.    CHW Next Follow up 2 weeks: 10-7-20

## 2021-06-12 NOTE — PROGRESS NOTES
Outpatient Mental Health Treatment Plan    Name:  Laine Sharma  :  1933  MRN:  [id number]    Treatment Plan:  Initial Treatment Plan  Intake/initial treatment plan date:  10/28/2020  Benefit and risks and alternatives have been discussed: Yes  Is this treatment appropriate with minimal intrusion/restrictions: Yes  Estimated duration of treatment:  10+  Anticipated frequency of services:  Every week.  Necessity for frequency: This frequency is needed to establish therapeutic goals and for continuity of care in order to monitor progress.  Necessity for treatment: To address cognitive, behavioral, and/or emotional barriers in order to work toward goals and to improve quality of life.    Session Type: Patient is presenting for an Individual session.    Plan:   Grief and Loss    Goal: Accept the loss of beloved ones and return to stable level of functioning as evidenced by acceptance     Strategies:    ? [x]  Express unresolved emotions regarding losses  ? [x]  Display an understanding of the grief process and how this process may be exacerbated by or may exacerbate mental illness symptoms   ?[x]  Develop an understanding of how avoidance of the grief process may affect functioning in all areas   ?[x]  Develop alternative diversions and other coping mechanisms that are related to loss issues   ?[x]  Resolve spiritual conflict   ?[x]  Redevelop a supportive social system and improve interpersonal relationships      Degree to which this is a problem (1-4): 4    Degree to which goal is met (1-4):1    Date of  Next Review: 2021       ?   ? Anxiety    Goal:  Decrease average anxiety level from 4 to 3.   Strategies: ? [x]Learn and practice relaxation techniques and other coping strategies (e.g., thought stopping, reframing,    meditation)     ? [x] Increase involvement in meaningful activities     ? [x] Discuss sleep hygiene     ? [x] Explore thoughts and expectations about self and others     ? [x] Identify and  monitor triggers for panic/anxiety symptoms     ? [x] Implement physical activity routine (with physician approval)     ? [] Consider introduction of bibliotherapy and/or videos     ? [x] Continue compliance with medical treatment plan (or explore  barriers)                                         Degree to which this is a problem: 4  Degree to which goal is met: 1  Date of  Next Review: 1/28/2021       Functional Impairment:  1=Not at all/Rarely  2=Some days  3=Most Days  4=Every Day    Personal : 4  Family : 4  Social : 4   Work/school : 0    Diagnosis:   1. Grief reaction   2.Adjustment disorder with anxiety    WHODAS 2.0 12-item version:2 or 4 %    Scores presented in qualifiers to represent level of disability.   NO Problem (none, absent, negligible,...) 0-4%    H1= 0  H2= 0  H3= 0    Clinical assessments and measures completed:. PHQ9: 11-due to grief ;GAD7;20; CAGE AID: 0/4; WHODAS 2:0: 4 %; C-SSRS: 0     Strengths:Patient identified the following strengths or resources that will help them succeed in treatment: Religion / Protestant, friends / good social support, family support, insight, intelligence and strong social skills.     Limitations:Things that may interfere with the patient's success in treatment include: grief : lost  and now brother is pretty ill.  Too much to handle alone.    Cultural Considerations:   American. Family oriented. Strong in her Latter day Ciara. Uses [employer]. Might needs extra support to handle household responsibilities.     Persons responsible for this plan: Patient and Provider    Psychotherapist Signature           Patient Signature:              Guardian Signature             Provider: Performed and documented by JUDE Newell; [facility]   Date:  10/28/2020

## 2021-06-12 NOTE — PROGRESS NOTES
"Evaluator Name: Rosa Stafford     Credentials:  MSW-LICSW;[facility]    PATIENT'S NAME: Laine Sharma  PREFERRED NAME: Laine  PREFERRED PRONOUNS: None  MRN:   [id number]  :   1933   ACCT. NUMBER: [id number]  DATE OF SERVICE: 10/08/2020  START TIME: 8:00  END TIME: 9:00  PREFERRED PHONE:[phone]  May we leave a program related message: Yes  SERVICE MODALITY:  Phone Visit:    The patient has been notified of the following:      \"We have found that certain health care needs can be provided without the need for a face to face visit.  This service lets us provide the care you need with a phone conversation.       I will have full access to your [facility] medical record during this entire phone call.  I will be taking notes for your medical record.      Since this is like an office visit, we will bill your insurance company for this service.       There are potential benefits and risks of telephone visits (e.g. limits to patient confidentiality) that differ from in-person visits. Confidentiality still applies for telephone services, and nobody will record the visit. It is important to be in a quiet, private space that is free of distractions (including cell phone or other devices) during the visit.??      If during the course of the call I believe a telephone visit is not appropriate, you will not be charged for this service\"     Consent has been obtained for this service by care team member: Yes     UNIVERSAL ADULT DIAGNOSTIC ASSESSMENT   Identifying Information:  Patient is a 87 y.o., .  The pronoun use throughout this assessment reflects the patient's chosen pronoun.  Patient was referred for an assessment by self.  Patient attended the session alone.     Chief Complaint:  The reason for seeking services at this time is: \"On ,  woke up in the middle of night grabbed the table lamp and left the house. I called my son who found him down street confused. He called for help and  was taken to " "memory care facility. He has been there since then. He does not recall anything. It makes me sad and I have been crying since then. I try to retrain myself from crying in front of him when I visit him but that all I do when I leave the facility. Crie all night until she is exhausted. I can see him only once a week due to COVID 19\" Patient shared she has dome decreased appetite and forces herself to eat. The problem(s) began the night of 8/3. Patient has attempted to resolve these concerns by seeing her PCP who sent to this writer by he PCP for psychotherapy to process her grief. This week patient found out that  is now being visited by hospice care team. This confuses her and wonders if they know something she does not know.   This week also, patient learned that her brother who lives in [place] is on ventilator due to COVID 19. She is not able to be there for him and she is concerned by his health. Niece wants her there but she can't make it as she has to be there for .    Social/Family History:  Patient reported they grew up in [place], [place].  They were raised by foster parents.   Parents  when patient was very younger. Never had a chance to know them. Grand parents could not afford to raise all 4 children so they sent them to foster care. 3 boys in one foster care and 2 girls in a different foster care. Patient reported that her childhood was terrible due to not having her own parents. Mother  giving birth the 5th child at age 23. Both mom and baby . Father  at age 42 of diabetes.  Patient is the youngest of 5. She lost her oldest brother 20 years ago.  Her other brother is 83. He lives in [facility]. He has disability and daughter prevents them from communicating. She is controlling. The other brother lives in [place]. He is in hospital, ventilator fighting COVID19. His daughter wants her there, a sign that he might not make it. This bring much more anxiety and sadness. Her older sister " never talked to her for 42 years now. She is described as greedy and sarcastic. The patient describes their cultural background as inclusive.  Has sisters in laws that are very supportive.     Cultural influences and impact on patient's life structure, values, norms, and healthcare: Spiritual Beliefs: Cheondoism/Jewish and very active.  Contextual influences on patient's health include: Individual Factors: never talked to sister for the last 42 years and Family Factors:  being in a memory care, brother being ill and on a ventulator fighting COVID 19 . These factors will be addressed in the Preliminary Treatment plan.  Patient identified their preferred language to be English. Patient reported she does not need the assistance of an  or other support involved in therapy.     Patient reported had no significant delays in developmental tasks. Patient's highest education level was GED. Left school in 11th grade to work. She was living in a foster home. Patient identified the following learning problems: none reported; only the fact she was living in a foster home.   Modifications will not be used to assist communication in therapy.  Patient reports they are  able to understand written materials.    Patient reported the following relationship history. Patient's current relationship status is  for 42 years.  Patient identified their sexual orientation as heterosexual.  Patient reported having zero child(pérez). Has 5 step children whom she treats like her own children and treat her like their own mother.     Patient's current living/housing situation involves staying in own home/apartment. She lives alone since 8/03 as  is now in memory care facility. She reports that housing is stable. Patient identified siblings, adult child, friends and sisters in law,  and others in [employer] as part of their support system.  Patient identified the quality of these relationships as stable and  meaningful.      Patient is currently retired since 1988. Patient reports their finances are obtained through [employer].  Patient does not identify finances as a current stressor.      Patient reported that they have not been involved with the legal system. Patient denies being on probation / parole / under the jurisdiction of the court.    Patient's Strengths and Limitations:  Patient identified the following strengths or resources that will help them succeed in treatment: Zoroastrian / Jew, friends / good social support, family support, insight, intelligence and strong social skills. Things that may interfere with the patient's success in treatment include: too much to handle alone, uncertaintly of the wellbeing of the loved ones (  in memory care/hospice, brother on a ventilator fighting COVID 19).   _______________________________________________  Personal and Family Medical History:    Patient does not report a family history of mental health concerns.  Patient reports family history is not on file. Patient denied any previous MH issues. Patient has had a physical exam to rule out medical causes for current symptoms.  Date of last physical exam was within the past year. Symptoms have developed since last physical exam and client was encouraged to follow up with PCP.. The patient has a [facility] Provider, who is named Abdulkadir Rodriguez MD Patient reports the following current medical concerns Hypotension for over 42 years. .  There are not significant appetite / nutritional concerns / weight changes.   Patient does not report a history of head injury / trauma / cognitive impairment.      Patient reports current meds as:   Current Outpatient Medications   Medication Sig Dispense Refill     acetaminophen (TYLENOL) 325 MG tablet Take 650 mg by mouth every 6 (six) hours as needed for pain.       amLODIPine (NORVASC) 10 MG tablet Take 1 tablet (10 mg total) by mouth daily. 90 tablet 3      hydroCHLOROthiazide (HYDRODIURIL) 25 MG tablet Take 1 tablet (25 mg total) by mouth daily. 30 tablet 11     losartan (COZAAR) 100 MG tablet Take 1 tablet (100 mg total) by mouth daily. 30 tablet 11     No current facility-administered medications for this visit.      Medication Adherence: Patient reports taking prescribed medications as prescribed.    Patient Allergies:    Allergies   Allergen Reactions     Iodine      Medical History:  No past medical history on file.    Current Mental Status Exam:   Appearance:  unable to assess over the phone    Eye Contact:  unable to assess over the phone   Psychomotor:  unable to assess over the phone       Gait / station:  Unable to assess over the phone    Attitude / Demeanor: Cooperative   Speech      Rate / Production: Normal/ Responsive      Volume:  Normal  volume      Language:  English is primary language  Mood:   Anxious  Irritable  Sad   Affect:   can't see her, on phone    Thought Content: Clear   Thought Process: Coherent  Logical  [place]      Associations: No loosening of associations  Insight:   Good   Judgment:  Intact   Orientation:  Person Place Time Situation  Attention/concentration: Good    Rating Scales:    PHQ9:    PHQ-9 SCORE 9/22/2020 10/1/2020 10/8/2020   PHQ-9 Total Score 1 2 0   ;    GAD7:    CHAU-7 Total Score 9/22/2020 10/1/2020 10/8/2020   CHAU-7 Total Score 7 10 9     CGI:     First:Considering your total clinical experience with this particular patient population, how severe are the patient's symptoms at this time?: 2 - Borderline ill (10/8/2020  8:00 AM)      Most recent:Compared to the patient's condition at the START of treatment, this patient's condition is:: 2 - Much improved (10/8/2020  8:00 AM)    Substance Use:     Patient reported no family history of chemical health issues.  Patient has not received chemical dependency treatment in the past.  Patient has not ever been to detox.  Patient is not currently receiving any chemical  dependency treatment.     Patient denies using alcohol.  Patient denies using tobacco.  Patient denies using marijuana.  Patient denies using caffeine.  Patient denies cocaine/crack use.  Patient denies meth/amphetamine use.  Patient denies use of heroin  Patient denies use of other opiates.  Patient denies inhalant use  Patient denies use of benzodiazepines.  Patient denies use of hallucinogens.  Patient denies use of barbiturates, sedatives, or hypnotics.  Patient denies use of over the counter drugs.  Patient denies use of other substances.    CAGE- AID:    CAGE-AID Total Score 9/22/2020   Total Score 0      Patient reported the following problems as a result of their substance use: none identified. No history of any mood altering substance    Dimension Scale Ratings:No history of any mood altering substance    Significant Losses / Trauma / Abuse / Neglect Issues:   Patient did not serve in the .  There are indications or report of significant loss, trauma, abuse or neglect issues related to: shared she lost mother when she was 2. Grew up in a foster home. Though denies any abuse there. Concerns for possible neglect are not present.     Safety Assessment:   Current Safety Concerns:  Cottle Suicide Severity Rating Scale (Lifetime/Recent)  Cottle Suicide Severity Rating (Lifetime/Recent) 9/22/2020   1. Wish to be Dead (Lifetime) No   1. Wish to be Dead (Past Month) No     Patient denies current homicidal ideation and behaviors.  Patient denies current self-injurious ideation and behaviors.    Patient denied risk behaviors associated with substance use.  Patient denies any high risk behaviors associated with mental health symptoms.  Patient reports the following current concerns for their personal safety: None.  Patient reports there are firearms in the house. None reported.     History of Safety Concerns:  Patient denied a history of homicidal ideation.    Patient denied a history of personal safety  concerns.    Patient denied a history of assaultive behaviors.   Patient denied a history of assaultive behaviors.     Patient denied a history of risk behaviors associated with substance use.  Patient denies any history of high risk behaviors associated with mental health symptoms.    Patient reports the following protective factors: spirituality, positive relationships positive family connections, forward/future oriented thinking, safe and stable environment, effectively controls impulses, secure attachment, committment to well-being and financial stability    Risk Plan:  See Preliminary Treatment Plan for Safety and Risk Management Plan    Review of Symptoms per patient report:  Depression: Change in sleep, Change in appetite, Feeling sad, and Frequent crying  Pauline:  No Symptoms  Psychosis: No Symptoms  Anxiety: Excessive worry, Nervousness and worries- CHAU 7: 9  Panic:  No symptoms  Post Traumatic Stress Disorder:  No Symptoms   Eating Disorder: No Symptoms  ADD / ADHD:  No symptoms  Conduct Disorder: No symptoms  Autism Spectrum Disorder: No symptoms  Obsessive Compulsive Disorder:No Symptoms    Patient reports the following compulsive behaviors and treatment history: None reported.      Diagnostic Criteria:   1.Grief reaction   2. Adjustment disorder with anxiety    Functional Status:  Patient reports the following functional impairments: good.     WHODAS:   WHODAS 9/22/2020   Total Score 12 or 25 %     Clinical Summary:   1. Reason for assessment: Psychotherapy after  moved to a memory care. Now experiencing grief.  Also brother on a ventilator fighting COVID 19. Concerns about possibility to lose him.  2. Psychosocial, Cultural and Contextual Factors:  in a memory care facility/hospice care  .  3. As evidenced by self report and criteria, client meets DSM5 criteria of:   Grief reaction   Other Diagnoses that is relevant to services : Adjustment disorder with anxiety  4. R/O: Major  Depressive  Disorder - does not meet DSM 5 criteria- PHQ9: 0. Also has been active, playing Puzzles, organizing her home and communicating with others.   5. Provisional Diagnosis:    Grief Reaction    Adjustment disorder with anxiety   6. Prognosis: Expect Improvement.  7. Likely consequences of symptoms if not treated: worsening and hospitalization  8. Client strengths include:  caring, empathetic, goal-focused, good listener, insightful, intelligent, motivated, open to learning and wants to learn .     Recommendations:     1. Plan for Safety and Risk Management:   Recommended that patient call 911 or go to the local ED should there be a change in any of these risk factors. Ongoing assessment.   Report to child / adult protection services was NA.     2. Patient's identified None reported.     3. Initial Treatment will focus on:     Adjustment Difficulties related to: family concerns(  and brother)     4. Resources/Service Plan:    services are not indicated.   Modifications to assist communication are not indicated.   Additional disability accommodations are not indicated.      5. Collaboration:   Collaboration / coordination of treatment will be initiated with the following support professionals: primary care physician.      6.  Referrals:   The following referral(s) will be initiated: Outpatient Mental Health Therapy.   Next Scheduled Appointment: 10/28/2020     A Release of Information has been obtained for the following: primary care physician.    7. GIA:   GIA:  Discussed denies using alcohol.. Provider gave patient printed information about the effects of chemical use on their health and well being. Recommendations:     8. Records:   were reviewed at time of assessment.   Information in this assessment was obtained from the medical record and provided by patient who is a a good historian.   Patient will have open access to their mental health medical record..    Eval type:  Mental Health    Staff  Name/Credentials:  Rosa GASPAR; [facility]     10/8/2020

## 2021-06-12 NOTE — PROGRESS NOTES
"Mental Health Psychotherapy Telephone Note    Patient: Laine Sharma    : 1933 MRN: [id number]    Date: 10/28/2020  Start time: 16:09   Stop Time: 17:00  Psychotherapy Session # 1    The patient has been notified of the following:   \"We have found that certain health care needs can be provided without the need for a face to face visit.  This service lets us provide the care you need with a phone conversation.  I will have full access to your [facility] medical record during this entire phone call.   I will be taking notes for your medical record. Since this is like an office visit, we will bill your insurance company for this service.  There are potential benefits and risks of telephone visits (e.g. limits to patient confidentiality) that differ from in-person visits.?  Confidentiality still applies for telephone services, and nobody will record the visit.  It is important to be in a quiet, private space that is free of distractions (including cell phone or other devices) during the visit.?? If during the course of the call I believe a telephone visit is not appropriate, you will not be charged for this service\"  Consent has been obtained for this service by care team member: Yes, per verbal agreement   Session Type: Patient is participating in a telemedicine phone visit: No device for video visit    Chief Complaint   Patient presents with     MH Follow Up     Telephone visit for psychtherapy      MH Treatment Plan     New symptoms or complaints: \"  passes away last week. Brother is very ill\"  PHQ9: 11;GAD7;20; CAGE AID: 0/4; WHODAS 2:0: 25 %; C-SSRS: 0    Functional Impairment:   Personal: 4  Family: 4  Work: 3  Social:0        ASSESSMENT: Current Emotional / Mental Status (status of significant symptoms):              Risk status (Self / Other harm or suicidal ideation)              Patient denied a personal safety              Patient denies current or recent suicidal ideation or behaviors.              " Patient denies current or recent homicidal ideation or behaviors.              Patient denies current or recent self injurious behavior or ideation.              Patient denies other safety concerns.              Patient denied the change in risk factors              Patient denied the change in the protective factors              Recommended that patient call 911 or go to the local ED should there be a change in any of these risk factors.                Attitude:                                   Cooperative               Orientation:                             3x              Speech                          Rate / Production:       Normal/ Responsive                          Volume:                       Normal               Mood:                                      Anxious  Sad  worried              Thought Content:                    Clear               Thought Form:                        Coherent  Logical               Insight:                                     Good     Patient's impression of their current status: Patient verified his/her name and  at the beginning  of this visit. Patient  Was seen outside the work hours. She was not available at the scheduled time( 10 am) as she is preparing for her 's funerals.  Patient's  passed away a week ago. He is to be put to rest tomorrow.  She reported severe emotional reactions and uncertainty due to the loss of her  and her brother who is also very ill.  Patient did though indicate that she has a strong support from her family and the community and is building back her daniel.  Her next psychotherapy visit is in a week    Therapist impression of patients current state: Active and empathetic listening, consistent eye  contact, unconditional positive regard, and warm acceptance were used to help provide direct emotional support to the patient. Writer and patient completed her treatment plan which will target current expressed emotions  related to grief.  Patient will do well with active listening, empathy, validation of her feelings, and a gentle challenge of some distortions related to her loss.    Type of psychotherapeutic technique provided: Insight oriented, Client centered, Solution-focused, CBT and Initial treatment plan, grief processing    Progress toward short term goals: more sadness, grief     Review of long term goals:  10/28/2020 next review: 1/28/2021  Diagnosis:  1. Grief reaction    2. Adjustment disorder with anxiety      Plan and Follow up:   Patient will spend some times with family members to distract her mind  Patient will call her  when she needs him to pray for her  Patient plans to look at her family album when she feels she is missing her   Patients next telephone visit is in a week.     Discharge Criteria/Planning: Patient will continue with follow-up until therapy can be discontinued without return of signs and symptoms.    I have reviewed the note as documented above.  This accurately captures the substance of my conversation with the patient.  As the provider I attest to compliance with applicable laws and regulations related to telemedicine.  Performed and documented by CHASITY Newell- JOAQUIM; [facility] 10/28/2020

## 2021-06-12 NOTE — PROGRESS NOTES
10/7/2020  Clinic Care Coordination Contact  [facility]/Voicemail       Clinical Data: Care Coordinator Outreach  Outreach attempted x 1.  Left message on patient's voicemail with call back information and requested return call.  Plan: Care CoordinatorCare Coordinator will try to reach patient again in 10 business days.

## 2021-06-12 NOTE — PROGRESS NOTES
Clinic Care Coordination Contact  Care Team Conversations     MIKEY left Laine a voicemail explaining MIKEY attempted to confirm eligibility with insurance for singles shot. She has a [employer] on file which appears to be just dental coverage. Then a [employer] on file and MIKEY was unable to get through to determine coverage.    Provided her with the [facility] phone number so she can call and review plan information

## 2021-06-12 NOTE — PROGRESS NOTES
"10/7/2020   Clinic Care Coordination Contact    Community Health Worker Follow Up    Goals:   Goals Addressed                 This Visit's Progress       Patient Stated      Mental Health Management (pt-stated)   50%     Goal Statement: I want to be aware of support groups in my area (grief,  recently in memory care) within 1-2 months  Date Goal set: 09/08/20  Barriers: Unsure of resources  Strengths: Children support  Date to Achieve By: 11/08/2020  Patient expressed understanding of goal: yes  Action steps to achieve this goal:  1. I will continue to follow up with therapist  Rafia Mcfarland   2. I will continue to do puzzles to keep busy.  3. I will continue to meet with sister in law and stepson on a weekly bases.  4. I will update CCC team    Updated: 10-7-20 AL        Other (pt-stated)        Goal Statement: I would resources and assistance to get shingle shots in the next 2 months.  Date Goal set: 10/7/2020    Barriers: limited insurance coverage  Strengths: has Medicare, UCare, stable monthly income  Date to Achieve By: 12-7-20  Patient expressed understanding of goal: Yes  Action steps to achieve this goal:  1. I will talk to [facility] SW on 10-16-20 at 9am for support to see what resources or assistance to cover shingle shots through Medicare and U[employer].  2. I will update [facility] Team at next outreach.                Called and spoke to patient and follow up on goal.  Patient reported:  -doing okay. Feels emotional in the evening \" I miss Dom. he is not at home anymore.\"  -saw stepson yesterday. [place] visits couple times a week. Had lunch with sister in law this week.  she comes to visit a few times a week  -working on a new puzzle-1000-piece   -can't go visit brother in [place] due to COVID.   -talking to therapist now she calls once a week. Has appt with her tomorrow at 8am.    Saw PCP 9-25-20 was told to call [facility] to get the shingle shots. She called but her insurance don't cover shingle " shots.    CHW supported and assisted patient to connect with [facility] in [facility] to check on coverage for shingle shots.  Conference call with patient and spoke to Shae, Pharmist at [facility] [phone].  Provided patient [employer] and Medicare ID number.  Stated based on her insurance it is cover but has to pay out of $97. Stated she if meets her deductible then it is cover 100%.     Patients stated she pays monthly premium of $357.00 and not sure why they are covering the shots.  Received monthly Social Security long-term $1200 and  is $1700.    Scheduled appt with [facility] MIKEY 10-16-20 at 9am for support to verify with insurance coverage for shingle shots and assistance/resources to cover it.    CHW Next Follow-up: monthly   [facility] MIKEY 10-16-20 at 9am    CHW Plan: follow up on goals    Next Outreach: 11-10-20

## 2021-06-13 NOTE — PROGRESS NOTES
11/10/2020  Clinic Care Coordination Contact  [facility]/Voicemail       Clinical Data: Care Coordinator Outreach  Outreach attempted x 1.  Left message on patient's voicemail with call back information and requested return call.  Plan: Care Coordinatordinorah try to reach patient again in 10 business days.    CHW Follow up: 11-23-20

## 2021-06-13 NOTE — PROGRESS NOTES
Clinic Care Coordination Contact    Situation: Patient chart reviewed by care coordinator.    Background: MIKEY completed chart review    Assessment: CHW assisted Laine with calling [facility], Laine continues to attend mental health therapy, reported her  recently      Plan/Recommendations: Standard outreach

## 2021-06-13 NOTE — PROGRESS NOTES
Writer called the patient for her session as scheduled. Writer left her a message since she did not answer the phone. Writer called again to check if she was available on phone this time. Did several time but still did not reach the patient.

## 2021-06-13 NOTE — PROGRESS NOTES
.  Assessment/ Plan  Problem List Items Addressed This Visit        Unprioritized    Grief reaction     Working with a therapist, discussed option of medication and decided against at this point.         Rotator cuff tendonitis, right - Primary     Recurrent problem, x-ray back in  was negative.  Recommend physical therapy, she agreed.  Discussed option of injection, declined at this point and I concur.  We will see back if needed.         Relevant Orders    Ambulatory referral to PT/OT        Subjective  CC:  Chief Complaint   Patient presents with     Arm Pain     right arm     HPI:    Right shoulder Pain    Duration/ timing of onset: About 1 month but she has had this before  Location of pain anterior, glenohumeral  Severity/ Quality: Sharp  Modifying factors: Make it worse-movement   make it better-rest  Clicking or popping?  No  History of shoulder problems/injury or previous work-up/ treatment?  Yes, not sure if she had physical therapy on this or not.      Patient's  recently , she is left alone and is very sad about this.  He was in a memory care facility for short amount of time before this happened.  She discussed back from staying with her brother in [place].  She may move down to [place] for the winter time  PFSH:  Patient Active Problem List   Diagnosis     Diverticulosis     Esophageal reflux     Osteoarthritis     Hyperlipidemia     Essential hypertension     Healthcare maintenance     Pes anserine bursitis     Nodule of finger of left hand     Grief reaction     Asymptomatic menopausal state      Adjustment disorder with depressed mood     Rotator cuff tendonitis, right     Current medications reviewed as follows:  Current Outpatient Medications on File Prior to Visit   Medication Sig     acetaminophen (TYLENOL) 325 MG tablet Take 650 mg by mouth every 6 (six) hours as needed for pain.     amLODIPine (NORVASC) 10 MG tablet Take 1 tablet (10 mg total) by mouth daily.     hydroCHLOROthiazide  "(HYDRODIURIL) 25 MG tablet Take 1 tablet (25 mg total) by mouth daily.     losartan (COZAAR) 100 MG tablet Take 1 tablet (100 mg total) by mouth daily.     No current facility-administered medications on file prior to visit.      Social History     Tobacco Use   Smoking Status Never Smoker   Smokeless Tobacco Never Used     Social History     Social History Narrative    Patient is  to Dom    Moved to [facility] house in 2016     Patient Care Team:  Abdulkadir Rodriguez MD as PCP - General  Abdulkadir Rodriguez MD as Assigned PCP  Ugo Garcia LGSW as Lead Care Coordinator (Primary Care - CC)  Brian Bojorquez CHW as Community Health Worker (Primary Care - CC)  Rosa Stafford LICSW as   ROS  As above      Objective  Physical Exam  Vitals:    11/25/20 1117   BP: 147/74   Patient Site: Right Arm   Patient Position: Sitting   Cuff Size: Adult Regular   Pulse: 69   Resp: 16   Temp: 97.4  F (36.3  C)   TempSrc: Tympanic   Weight: 152 lb (68.9 kg)   Height: 4' 10\" (1.473 m)     Body mass index is 31.77 kg/m .  Patient is tearful, discussing recent death of her .  Symmetric musculature of shoulder muscles and scapular muscles when viewed from behind.  No pain to palpation on AC joint coracoid process and moderate pain glenohumeral joint.  R.  Passive range of motion to 90  is normal and pain-free.  There is no pain on resisted internal rotation and intact strength.  There is moderate pain on resisted external rotation and decrease strength  Empty can sign is positive    Diagnostics:   None      Please note: Voice recognition software was used in this dictation.  It may therefore contain typographical errors.      "

## 2021-06-13 NOTE — PROGRESS NOTES
11/23/2020   Clinic Care Coordination Contact    Community Health Worker Follow Up    Goals:   Goals Addressed                 This Visit's Progress       Patient Stated      Mental Health Management (pt-stated)        Goal Statement: I want to be aware of support groups in my area (grief,  recently in memory care) within 1-2 months  Date Goal set: 09/08/20  Barriers: Unsure of resources  Strengths: Children support  Date to Achieve By: 11/08/2020  Patient expressed understanding of goal: yes  Action steps to achieve this goal:  1. I will continue to follow up and talk with therapist on 12-9-20 for support.  2. I continue to finish up 1000 piece puzzles to keep me busy.  3. I continue to talk with family members on a regular bases everyday and week.  4. I will update [facility] team at outreach.    Updated: 11-23-20 AL        Other (pt-stated)   30%     Goal Statement: I would resources and assistance to get shingle shots in the next 2 months.  Date Goal set: 10/7/2020    Barriers: limited insurance coverage  Strengths: has Medicare, [employer], stable monthly income  Date to Achieve By: 12-7-20  Patient expressed understanding of goal: Yes  Action steps to achieve this goal:  1. I will call to [facility] with CHW at next outreach for support resources or assistance to cover shingle shots through Medicare and [employer].  2. I will update [facility] Team at next outreach.    Updated: 11-23-20 AL        Called and spoke to patient and follow up on goals.  Patient reported:  -continue to talk to therapist every 2 weeks. Next appt 12-9-20  -continue to do puzzles to keep busy  -continue to connect with family members everyday   -will going spend Thanksgiving with windy's family.    Patient requested help look for resources assistance to get shingle shots.  CHW support to connect with [facility].      CHW Next Follow-up: 12-17-20  CHW Plan: support to connect with [facility] about shingle shots

## 2021-06-13 NOTE — PROGRESS NOTES
12/17/2020   Clinic Care Coordination Contact    Community Health Worker Follow Up    Goals:   Goals Addressed                 This Visit's Progress       Patient Stated      Mental Health Management (pt-stated)        Goal Statement: I want to be aware of support groups in my area (grief,  recently in memory care) within 1-2 months  Date Goal set: 09/08/20  Barriers: Unsure of resources  Strengths: Children support  Date to Achieve By: 11/08/2020  Patient expressed understanding of goal: yes  Action steps to achieve this goal:  1. I will continue to follow up and talk with therapist on 1-6-21 for support.  2. I will go out of town for 2 weeks to visit family in [place].  3. I will continue to connect with family members on a regular bases.  4. I will talk to [facility] SW about any support group.  5 I will update [facility] team at outreach.    Updated: 12-17-20 AL        Other (pt-stated)   30%     Goal Statement: I would resources and assistance to get shingle shots in the next 2 months.  Date Goal set: 10/7/2020    Barriers: limited insurance coverage  Strengths: has Medicare, [employer]Care, stable monthly income  Date to Achieve By: 12-7-20  Patient expressed understanding of goal: Yes  Action steps to achieve this goal:  1. I will call with CHW to connect with [facility] at next outreach for support resources or assistance to cover shingle shots through Medicare and [employer].  2. I will update [facility] Team at next outreach.    Updated: 12-17--20 AL          Called and spoke to patient and follow up on goals.  Patient reported:  - passed away.  Expressed condolences to patient and her family from [facility] team.  Patient became very emotional.  -trying to keep busy.  -going out of town to be with step-daughter and grandchildren in [place] for 2 weeks. Will be leaving this Saturday and returning on 1-2-21.  Requested help to cancel appt with the therapist.  Patient has appt on 12-23-20 ansd 1-6-21 with Rosa Stafford  LICSW.    Would like to join support group if they have any or telephone support group.      CHW [facility] as [facility] and to cancel the 12-23-20 appt      CHW Next Follow-up: 1-20-21    CHW Plan: consult with CC MIKEY about support group for patient.

## 2021-06-13 NOTE — PROGRESS NOTES
"Mental Health Psychotherapy Telephone Note    Patient: Laine Sharma    : 1933 MRN: [id number]    Date: 2020   Start time: 8:04  Stop Time: 8:56  Session # 3    The patient has been notified of the following:   \"We have found that certain health care needs can be provided without the need for a face to face visit.  This service lets us provide the care you need with a phone conversation.  I will have full access to your [facility] medical record during this entire phone call.   I will be taking notes for your medical record. Since this is like an office visit, we will bill your insurance company for this service.  There are potential benefits and risks of telephone visits (e.g. limits to patient confidentiality) that differ from in-person visits.?  Confidentiality still applies for telephone services, and nobody will record the visit.  It is important to be in a quiet, private space that is free of distractions (including cell phone or other devices) during the visit.?? If during the course of the call I believe a telephone visit is not appropriate, you will not be charged for this service\"  Consent has been obtained for this service by care team member: Yes, per verbal agreement   Session Type: Patient is participating in a telemedicine phone visit : No device available for a video    Chief Complaint   Patient presents with      Follow Up     Telephone visit for psychotherapy      New symptoms or complaints: Grief  GAD7:5  PHQ9:0    Functional Impairment:   Personal: 3  Family: 3  Work: 3  Social:3        ASSESSMENT: Current Emotional / Mental Status (status of significant symptoms):              Risk status (Self / Other harm or suicidal ideation)              Patient denied personal safety              Patient denies current or recent suicidal ideation or behaviors.              Patient denies current or recent homicidal ideation or behaviors.              Patient denies current or recent self injurious " behavior or ideation.              Patient denies other safety concerns.              Patient denied change in risk factors              Patient denied the change in the protective factors              Recommended that patient call 911 or go to the local ED should there be a change in any of these risk factors.                Attitude:                                   Cooperative               Orientation:                             3X              Speech                          Rate / Production:       Normal/ Responsive                          Volume:                       Normal               Mood:                                      Less Anxious               Thought Content:                    Clear               Thought Form:                        Coherent  Logical               Insight:                                     Good     Patient's impression of their current status: Patient is back from [place]. She notes she is doing well after seeing her brother and the rest of the family. Family knows her nephew's wife is at her last days due to medical issues( Alzheimer's and heart failure. This has been making her think much but she stated she is finding ways to keep her busy. Does things around the house. Wants to keep up with good memories of the last 42 years with  versus sadness. Has been in touch with a neighbor friend who also lost her . They both hang out together and feel safe from COVID.  They plan to spend a time together on Thanksgiving instead of joining big family gathering. Her next visit is in 3 weeks.     Therapist impression of patients current state: Writer assessed the patient's safety. Processed her feelings around her loss and other family issues. Patient remains positive and had hope for a future without . She has insight about things she is not able to control and find grief and a normal process for anyone in similar situation.     Type of psychotherapeutic  technique provided: Insight oriented, Client centered, Solution-focused, CBT and Grief processing    Progress toward short term goals: Patient is doing pretty good all things considered    Review of long term goals: Initial treatment plan:10/28/2020 next review: 1/28/2021    Diagnosis:  1. Grief reaction    2. Adjustment disorder with anxiety      Plan and Follow up: Patient plans to see her PCP due to what she described as a possible arthritis. Patient will keep in touch with family . Patient plans to continue sharing her feelings with her friend who faced similar situation for support.     Discharge Criteria/Planning: Patient will continue with follow-up until therapy can be discontinued without return of signs and symptoms.    I have reviewed the note as documented above.  This accurately captures the substance of my conversation with the patient.  As the provider I attest to compliance with applicable laws and regulations related to telemedicine.  Performed and documented by CHASITY Newell- JOAQUIM; [facility] 11/18/2020

## 2021-06-14 NOTE — PROGRESS NOTES
1/25/2021  Clinic Care Coordination Contact    Community Health Worker Follow Up    Goals:   Goals Addressed                 This Visit's Progress       Patient Stated      Mental Health Management (pt-stated)   80%     Goal Statement: I want to be aware of support groups in my area (grief,  recently in memory care) within 1-2 months  Date Goal set: 09/08/20  Barriers: Unsure of resources  Strengths: Children support  Date to Achieve By: 11/08/2020  Patient expressed understanding of goal: yes  Action steps to achieve this goal:  1. I will continue to follow up and talk with therapist for support and ask if the therapist if she knows of any virtual senior support group.  2. I will continue to connect with family members on a regular bases.  3. I will update [facility] team at outreach.    Updated: 1-25-21 AL        COMPLETED: Other (pt-stated)   100%     Goal Statement: I will go to [facility] to get shingle shots whenever I decides to get it.    Barriers: limited insurance coverage  Strengths: has Medicare, [employer], stable monthly income  Date to Achieve By: 12-7-20  Patient expressed understanding of goal: Yes  Personal Plan:  I will pay for the shingle shots if I decides to get it.  I will go to [facility] to get the shots.          Other (pt-stated)        .Goal Statement: I would like support to get information on where I can get the COVID-19 vaccine within the next 2 months.  Date Goal set: 1/25/2021  Barriers: limited info regarding COVID vaccine  Strengths: motivated to get the vaccine  Date to Achieve By: 3-21-21  Patient expressed understanding of goal: yes  Action steps to achieve this goal:  1. I will wait for [facility] Team and clinic to inform me where I can go to get the COVID vaccine.  2. I will check with the doctor at next office visit.  3. I will update CHW at next outreach.            Called and spoke to patient and follow up on goals and supported to connect with [facility].    Conference call to [facility] and spoke to Gay and suggested to contact [facility] [phone] to find out where she can get the COVID vaccine for free and that no program or resources for shingle shots.  She has to pay 20% deductible for the medicine.  Not much resources and programs.    Conference call to [place] [facility] [phone] and spoke to Billy regarding information about [facility] Program.  Patient provided financial information.  Stated she is over the asset guideline [id number]  Patient received 's benefit when he passed away which makes her not qualify for the AC.    Patient will decide if she will get the shingle shots and if she does she will go to [facility] and pay for the shingle shots which [facility] cover for some but not 100%.  Patient has lunch with family today.  Continue to talk to therapist for support.        CHW Follow up: Monthly    CHW Plan: Follow up on goals    CHW Plan: check on where to get COVID vaccine.    CHW Next Follow-up: 3-1-21

## 2021-06-14 NOTE — PROGRESS NOTES
Assessment/ Plan  Problem List Items Addressed This Visit        Medium    Osteoarthritis     Generalized but anterior knee pain worse today, referred for physical therapy.  Also physical therapy for post fracture shoulder pain (2012) left side         Relevant Orders    Ambulatory referral to PT/OT      Other Visit Diagnoses     Frequency of urination    -  Primary    Relevant Orders    Urinalysis (Completed)    Culture, Urine    Shoulder pain, left        Relevant Orders    Ambulatory referral to PT/OT    Bilateral anterior knee pain        Relevant Orders    Ambulatory referral to PT/OT      Regarding shoulder pain, history of impacted humeral head fracture 2012.  Now with some degree of restricted range of motion, seems to have preserved strength and rotator cuff muscles.  Impingement syndrome versus adhesive capsulitis but history of long-term pain more compatible with impingement syndrome.  Referral for range of motion exercises/shoulder strengthening etc.    Patient with anterior knee pain, sedentary lifestyle, needs to walk more, referral for physical therapy for quadricep strengthening exercises.    Probable UTI based on patient's symptoms despite negative urine, will empirically treat with trimethoprim sulfa and await urine culture  Subjective  CC:  Chief Complaint   Patient presents with     Medication Refill     Shoulder Pain     right side     HPI:  L Shoulder Pain  Narrative: hx of fx  Notes:  Duration/ timing of onset: long standing  Location of pain anterior glenohumoral joint and radiates down the outside of the arm  Severity/ Quality: sharp- contant  Modifying factors: Make it worse- laying on arm, reaching up   Make it better- tylenol helps some  Clicking or popping? no  History of shoulder problems/injury or previous work-up/ treatment?  Shoulder x-ray dated 10/11/2012 showed impacted fracture of proximal humeral head without dislocation, degenerative changes of the AC joint, osteopenia    This  was taken during an emergency room visit for fall.  Reviewed these records today.  She was placed in sling and discharged  Frequency of Urination  Narrative: 1 week of frequency and urgency  --------------------  Duration/Onset 1 week  Associated Dysuria? no  Urinary urgency? yes  Change in urinary appearance or smell? smells  Previous history of UTI?- yes and this seems like one  How frequent during the day?- a lot- then small voids    Comment: feels like a bladder infection-has some lower abdominal discomfort    Anterior knee pain  Knees feel week-for years  Grinding and popping   Anterior pain  Trouble climbing stairs    Pt recently moved to a single level apartment because of difficulty climbing stairs.  In previous apartments, she had walking trails outside.  Now, not walking because of this and the ice.    PFSH:  Patient Active Problem List   Diagnosis     Diverticulosis     Esophageal reflux     Osteoarthritis     Hyperlipidemia     Essential hypertension     Current medications reviewed as follows:  Current Outpatient Prescriptions on File Prior to Visit   Medication Sig     acetaminophen (TYLENOL) 325 MG tablet Take 650 mg by mouth every 6 (six) hours as needed for pain.     amLODIPine (NORVASC) 10 MG tablet Take 1 tablet (10 mg total) by mouth daily.     biotin 1 mg tablet Take 1,000 mcg by mouth daily.      bisoprolol (ZEBETA) 5 MG tablet Take 1 tablet (5 mg total) by mouth daily.     omeprazole (PRILOSEC OTC) 20 MG tablet Take 40 mg by mouth daily.     triamcinolone (KENALOG) 0.1 % cream Apply to rash twice daily.     valsartan-hydrochlorothiazide (DIOVAN-HCT) 320-25 mg per tablet TAKE ONE TABLET BY MOUTH ONCE DAILY     [DISCONTINUED] amoxicillin (AMOXIL) 500 MG capsule Take 1 capsule (500 mg total) by mouth 3 (three) times a day.     [DISCONTINUED] aspirin 325 MG tablet Take 325 mg by mouth every 6 (six) hours as needed for pain.     No current facility-administered medications on file prior to visit.   "    History   Smoking Status     Never Smoker   Smokeless Tobacco     Never Used     Social History     Social History Narrative    Patient is  to [employer]    Moved to town house in 2016         Patient Care Team:  Abdulkadir Rodriguez MD as PCP - General  ROS  Full 10 system review including constitutional, respiratory, cardiac, gi, urinary, rheumatologic, neurologic, reproductive, dermatologic psychiatric is  performed (via questionnaire) and is negative except ringing in ears, joint pain, getting up at night excessively to urinate      Objective  Physical Exam  Vitals:    12/21/17 0801   BP: 112/52   Pulse: (!) 54   Resp: 20   SpO2: 97%   Weight: 161 lb (73 kg)   Height: 4' 10.5\" (1.486 m)     Body mass index is 33.08 kg/(m^2).  Symmetric musculature of shoulder muscles and scapular muscles when viewed from behind mild pain pain to palpation on AC joint-with hypertrophy of that joint.  No pain coracoid process and moderate pain on palpation of glenohumeral joint on affected shoulder.  Passive range of motion is difficult to assess as patient guards but appears to have some pain and be somewhat limited.  Negative apprehension test.    There is no pain on resisted internal rotation and preserved strength.  There is no pain on resisted external rotation and preserved strength  Empty can sign is difficult due to range of motion    Diagnostics:   None, reviewed previous shoulder x-ray as above      Please note: Voice recognition software was used in this dictation.  It may therefore contain typographical errors.          "

## 2021-06-14 NOTE — PROGRESS NOTES
"Mental Health Psychotherapy Telephone Note    Patient: Laine Sharma    : 1933 MRN: [id number]    Completed a 2 point identification verification: patient verbalized full name and date of birth.     Date: 2021  Start time: 9:08  Stop Time: 9:56   Session # 3 this year    The patient has been notified of the following:   \"We have found that certain health care needs can be provided without the need for a face to face visit.  This service lets us provide the care you need with a phone conversation.  I will have full access to your [facility] medical record during this entire phone call.   I will be taking notes for your medical record. Since this is like an office visit, we will bill your insurance company for this service.  There are potential benefits and risks of telephone visits (e.g. limits to patient confidentiality) that differ from in-person visits.?  Confidentiality still applies for telephone services, and nobody will record the visit.  It is important to be in a quiet, private space that is free of distractions (including cell phone or other devices) during the visit.?? If during the course of the call I believe a telephone visit is not appropriate, you will not be charged for this service\"  Consent has been obtained for this service by care team member: Yes, per verbal agreement   Session Type: Patient is participating in a telemedicine phone visit: No device for psychotherapy    Those present for this session:       Chief Complaint   Patient presents with      Follow Up     Telephone visit for psychotherapy        New symptoms or complaints: None reported    Functional Impairment:   Personal: 3  Family: 3  Work: 0  Social:3        ASSESSMENT: Current Emotional / Mental Status (status of significant symptoms):              Risk status (Self / Other harm or suicidal ideation)              Patient denies safety issues              Patient denies current or recent suicidal ideation or behaviors.       "        Patient denies current or recent homicidal ideation or behaviors.              Patient denies current or recent self injurious behavior or ideation.              Patient denies other safety concerns.              Patient denies change in risk factors              Patient denies change in protective factors              Recommended that patient call 911 or go to the local ED should there be a change in any of these risk factors.                Attitude:                                   Cooperative               Orientation:                             3X              Speech                          Rate / Production:       Normal/ Responsive                          Volume:                       Normal               Mood:                                      Normal              Thought Content:                    Clear               Thought Form:                        Coherent  Logical               Insight:                                     Good     Patient's impression of their current status: Patient reports she is doing okay all things considered. She has been in touch with her family, friends and her . Finds some comfort as they all have determination to keep her busy by checking on her daily. Patient notes nights are harder. Though she is willing to try new things such as writing about her her life with late  to occupy her mind and increase chances of a better sleep. Finds therapy helping and will have the next telephone visit in a week.     Therapist impression of patients current state: Writer assessed patient's safety. Patient was given a feed back about how she has been coping with her losses. Also was encouraged to start new challenge that would help keep up with positivity and a better sleep. Patient's idea to visit some places she and her late  visited together. It is also okay to visit the memory loss place as she has wished. She was encouraged to think about safety as she  "plans to visit these places.     Type of psychotherapeutic technique provided: Insight oriented, Client centered, Solution-focused and CBT    Progress toward short term goals: Progress as expected, notes \"some sort of relief day by day\"    Progress as expected, Pt discussing concerns and coping strategies during tele-sessions. Pt working on homework assignments and skills learned in therapy between sessions    Review of long term goals: Treatment plan updated: 10/28/2020 next review: 1/28/2021    Diagnosis:  1. Grief reaction    2. Adjustment disorder with anxiety      Plan and Follow up:   Patient will continue to practice her coping skills that allow her to keep up with positivity and hope. Patient will start her new challenge of a free writing about her life with late .  Patient plans plans to visit the memory loss facility where her  was receiving care, today.    Discharge Criteria/Planning: Patient will continue with follow-up until therapy can be discontinued without return of signs and symptoms.              I have reviewed the note as documented above.  This accurately captures the substance of my conversation with the patient.  As the provider I attest to compliance with applicable laws and regulations related to telemedicine.  Performed and documented by CHASITY Newell- JOAQUIM; [facility] 1/27/2021  "

## 2021-06-14 NOTE — PROGRESS NOTES
Clinic Care Coordination Contact    Situation: Patient chart reviewed by care coordinator.    Background: SW completed chart review    Assessment: patient has been attending mental health therapy, was working with CHW to learn about COVID vaccine and shingle vaccine options     Plan/Recommendations: Standard Outreach

## 2021-06-14 NOTE — TELEPHONE ENCOUNTER
Refill Approved    Rx renewed per Medication Renewal Policy. Medication was last renewed on 12/2/19.    Jayjay Gonzalez, [facility] Triage/Med Refill 2/3/2021     Requested Prescriptions   Pending Prescriptions Disp Refills     losartan (COZAAR) 100 MG tablet 30 tablet 11     Sig: Take 1 tablet (100 mg total) by mouth daily.       Angiotensin Receptor Blocker Protocol Passed - 2/3/2021 11:19 AM        Passed - PCP or prescribing provider visit in past 12 months       Last office visit with prescriber/PCP: 11/25/2020 Abdulkadir Rodriguez MD OR same dept: 11/25/2020 Abdulkadir Rodriguez MD OR same specialty: 11/25/2020 Abdulkadir Rodriguez MD  Last physical: 9/3/2020 Last MTM visit: Visit date not found   Next visit within 3 mo: Visit date not found  Next physical within 3 mo: Visit date not found  Prescriber OR PCP: Abdulkadir Rodriguez MD  Last diagnosis associated with med order: 1. Essential hypertension  - losartan (COZAAR) 100 MG tablet; Take 1 tablet (100 mg total) by mouth daily.  Dispense: 30 tablet; Refill: 11    If protocol passes may refill for 12 months if within 3 months of last provider visit (or a total of 15 months).             Passed - Serum potassium within the past 12 months     Lab Results   Component Value Date    Potassium 3.4 (L) 09/03/2020             Passed - Blood pressure filed in past 12 months     BP Readings from Last 1 Encounters:   11/25/20 147/74             Passed - Serum creatinine within the past 12 months     Creatinine   Date Value Ref Range Status   09/03/2020 0.84 0.60 - 1.10 mg/dL Final

## 2021-06-14 NOTE — PROGRESS NOTES
"Mental Health Psychotherapy Telephone Note    Patient: Laine Sharma    : 1933 MRN: [id number]    Completed a 2 point identification verification: patient verbalized full name and date of birth.     Date: 2021  Start time: 11:05   Stop Time: 11:56  Session # 1 this year.     The patient has been notified of the following:   \"We have found that certain health care needs can be provided without the need for a face to face visit.  This service lets us provide the care you need with a phone conversation.  I will have full access to your [facility] medical record during this entire phone call.   I will be taking notes for your medical record. Since this is like an office visit, we will bill your insurance company for this service.  There are potential benefits and risks of telephone visits (e.g. limits to patient confidentiality) that differ from in-person visits.?  Confidentiality still applies for telephone services, and nobody will record the visit.  It is important to be in a quiet, private space that is free of distractions (including cell phone or other devices) during the visit.?? If during the course of the call I believe a telephone visit is not appropriate, you will not be charged for this service\"  Consent has been obtained for this service by care team member: Yes, per verbal agreement   Session Type: Patient is participating in a telemedicine phone visit: No device for video    Those present for this session: Patient and therapist    Chief Complaint   Patient presents with     MH Follow Up     Telephone visit for psychotherapy      New symptoms or complaints: Grief  GAD7:2  PHQ9: 0    Functional Impairment:   Personal: 3  Family: 3  Work: 3  Social:3        ASSESSMENT: Current Emotional / Mental Status (status of significant symptoms):              Risk status (Self / Other harm or suicidal ideation)              Patient denies safety issues              Patient denies current or recent suicidal " ideation or behaviors.              Patient denies current or recent homicidal ideation or behaviors.              Patient denies current or recent self injurious behavior or ideation.              Patient denies other safety concerns.              Patient denies change in risk factors              Patient denies change in protective factors              Recommended that patient call 911 or go to the local ED should there be a change in any of these risk factors.                Attitude:                                   Cooperative               Orientation:                             3X              Speech                          Rate / Production:       Normal/ Responsive                          Volume:                       Normal               Mood:                                      Normal              Thought Content:                    Clear               Thought Form:                        Coherent  Logical               Insight:                                     Good     Patient's impression of their current status: Patient reports she has been doing better with grief. Has figured keeping herself busy with some puzzle, her cat, doing some household activities, and keeping in touch with her family keep her busy. Though finds nights are hard. She was connected to the [facility]. They are only using Zoom. She is planning to connect with her step son to assist with a device that will help her connect with others who lost their spouses. Patient also wants to continue with her individual session, at least for now.     Therapist impression of patients current state: Writer assessed patient's safety. Acknowledged patient effort to keep herself busy with healthy coping skills. Contacted the [facility] to inquire about group support. Patient will do well with some support group. Though per the lead of the group, she will need to have a device to help access video. Patient was  encouraged to have her step son assist with getting a device and learning how to use it. She was receptive to this intervention.     Type of psychotherapeutic technique provided: Insight oriented, Client centered, Solution-focused, CBT and Grief process    Progress toward short term goals: Making some progress with grief.     Review of long term goals: Initial treatment plan:10/28/2020 next review: 1/28/2021     Diagnosis:  1. Grief reaction    2. Adjustment disorder with anxiety      Plan and Follow up:   Patient will call the [facility] for grief about the support group.   Her next telephone visit is in a week    Discharge Criteria/Planning: Patient will continue with follow-up until therapy can be discontinued without return of signs and symptoms.    I have reviewed the note as documented above.  This accurately captures the substance of my conversation with the patient.  As the provider I attest to compliance with applicable laws and regulations related to telemedicine.  Performed and documented by CHASITY Newell- JOAQUIM; [facility] 1/6/2021

## 2021-06-14 NOTE — PROGRESS NOTES
"Mental Health Psychotherapy Telephone Note    Patient: Laine Sharma    : 1933 MRN: [id number]    Completed a 2 point identification verification: patient verbalized full name and date of birth.     Date: 2021  Start time: 8:07   Stop Time: 8:48  Session # 2 this week.     The patient has been notified of the following:   \"We have found that certain health care needs can be provided without the need for a face to face visit.  This service lets us provide the care you need with a phone conversation.  I will have full access to your [place] medical record during this entire phone call.   I will be taking notes for your medical record. Since this is like an office visit, we will bill your insurance company for this service.  There are potential benefits and risks of telephone visits (e.g. limits to patient confidentiality) that differ from in-person visits.?  Confidentiality still applies for telephone services, and nobody will record the visit.  It is important to be in a quiet, private space that is free of distractions (including cell phone or other devices) during the visit.?? If during the course of the call I believe a telephone visit is not appropriate, you will not be charged for this service\"  Consent has been obtained for this service by care team member: Yes, per verbal agreement   Session Type: Patient is participating in a telemedicine phone visit: No access to video.    Those present for this session: Patient and therapist    Chief Complaint   Patient presents with     MH Follow Up     Telephone visit for psychotherapy      New symptoms or complaints: grief  PHQ9:0  GAD7: 0    Functional Impairment:   Personal: 3  Family: 3  Work: 3  Social:3     ASSESSMENT: Current Emotional / Mental Status (status of significant symptoms):              Risk status (Self / Other harm or suicidal ideation)              Patient denies safety concern              Patient denies current or recent suicidal ideation " or behaviors.              Patient denies current or recent homicidal ideation or behaviors.              Patient denies current or recent self injurious behavior or ideation.              Patient denies other safety concerns.              Patient denies change in risk factors              Patient denies change in protective factors              Recommended that patient call 911 or go to the local ED should there be a change in any of these risk factors.                Attitude:                                   Cooperative               Orientation:                             3X              Speech                          Rate / Production:       Normal/ Responsive                          Volume:                       Normal               Mood:                                      Grieving tearful              Thought Content:                    Clear               Thought Form:                        Coherent  Logical               Insight:                                     Good     Patient's impression of their current status: Patient reports ongoing sadness especially during night and early morning. Followed through with the resources she was given at the previous session for a support group. Was told these groups are on Zoom. Today, she agreed to check with family members if they can assist accessing a tool for her group support via Zoom. Patient is going to use the number provided today to connect with another organization for grief resources. Patient continues to connect with the family and feels she is being supported. Only none lives with her. She is only with her cat. Will continue with her telephone visits weekly.    Therapist impression of patients current state: Writer continues to process patient's grief and providing resources for group support in the community to help with grief. Patient has insight about her needs. She acknowledges it might take sometime to active the acceptance in her grief  process. She is determined and receptive trying option dicussed in the session.     Type of psychotherapeutic technique provided: Insight oriented, Client centered, Solution-focused, CBT and grief processing    Progress toward short term goals: Progress as expected, Pt discussing concerns and coping strategies during tele-sessions. Pt working on homework assignments and skills learned in therapy between sessions    Review of long term goals: Initial treatment plan: 10/28/2020 next review: 1/28/2021     Diagnosis:  1. Grief reaction    2. Adjustment disorder with anxiety      Plan and Follow up:   Patient will continue practicing the coping skills to alleviate the pain from her loss. Patient will keep in touch with her family. Patient will use the resources provided to connect with support groups in the community.       Discharge Criteria/Planning: Patient will continue with follow-up until therapy can be discontinued without return of signs and symptoms.     I have reviewed the note as documented above.  This accurately captures the substance of my conversation with the patient.  As the provider I attest to compliance with applicable laws and regulations related to telemedicine.  Performed and documented by CHASITY Newell- JOAQUIM; [facility] 1/13/2021

## 2021-06-14 NOTE — TELEPHONE ENCOUNTER
Reason for Call:  Medication or medication refill:refill    Do you use a [facility]?  Name of the pharmacy and phone number for the current request: [facility]    Name of the medication requested: losartan    Other request: n/a    Can we leave a detailed message on this number? No call back needed    Phone number patient can be reached at: Home number on file [phone] (home)    Best Time: asap they told her no refill???    Call taken on 2/3/2021 at 9:46 AM by Yulissa Lynne

## 2021-06-14 NOTE — PROGRESS NOTES
1/20/2021  Clinic Care Coordination Contact    Community Health Worker Follow Up    Goals:   Goals Addressed                 This Visit's Progress       Patient Stated      Mental Health Management (pt-stated)   80%     Goal Statement: I want to be aware of support groups in my area (grief,  recently in memory care) within 1-2 months  Date Goal set: 09/08/20  Barriers: Unsure of resources  Strengths: Children support  Date to Achieve By: 11/08/2020  Patient expressed understanding of goal: yes  Action steps to achieve this goal:  1. I will continue to follow up and talk with therapist for support.  2. I will continue to connect with family members on a regular bases.  3. I will talk to [facility] with [facility] on 1-25-21 if there are any virtual senior support groups.  4. I will update [facility] team at outreach.    Updated: 1-20-21 AL        Other (pt-stated)   30%     Goal Statement: I would resources and assistance to get shingle shots in the next 2 months.  Date Goal set: 10/7/2020    Barriers: limited insurance coverage  Strengths: has Medicare, [employer], stable monthly income  Date to Achieve By: 12-7-20  Patient expressed understanding of goal: Yes  Action steps to achieve this goal:  1. I will talk to CHW on 1-25-21 at 10am to connect with [facility] at next outreach for support resources or assistance to cover shingle shots and COVID-19 vaccine.  2. I will update [facility] Team at next outreach.    Updated: 1-20-21 AL          Called and spoke to patient and follow up on goals.  Patient reported:  -[facility] won't pay for the COVID vaccine. It will cost her $128 for the 1st shot and $135 for 2nd shot  She was going to get it but with the cost she can't afford it.  -continue to talk to therapist weekly for support.    Requested CHW for support to call with [facility] for resources and help to get COVID vaccine and shingle shot.    Scheduled telephone appt with CHW 1-25-21 at 10am to support to  connect with [facility].      CHW Follow up: Monthly    CHW Plan: Follow up on goals    CHW Next Follow Up: 1-25-21

## 2021-06-14 NOTE — PROGRESS NOTES
"Mental Health Psychotherapy Telephone Note    Patient: Laine Sharma    : 1933 MRN: [id number]    Completed a 2 point identification verification: patient verbalized full name and date of birth.     Date: 2021  Start time: 10:04  Stop Time: 10:54   Session # 2    The patient has been notified of the following:   \"We have found that certain health care needs can be provided without the need for a face to face visit.  This service lets us provide the care you need with a phone conversation.  I will have full access to your [place] medical record during this entire phone call.   I will be taking notes for your medical record. Since this is like an office visit, we will bill your insurance company for this service.  There are potential benefits and risks of telephone visits (e.g. limits to patient confidentiality) that differ from in-person visits.?  Confidentiality still applies for telephone services, and nobody will record the visit.  It is important to be in a quiet, private space that is free of distractions (including cell phone or other devices) during the visit.?? If during the course of the call I believe a telephone visit is not appropriate, you will not be charged for this service\"  Consent has been obtained for this service by care team member: Yes, per verbal agreement   Session Type: Patient is participating in a telemedicine phone visit: No device for video    Those present for this session: Patient and therapist    Chief Complaint   Patient presents with     MH Follow Up     Telephone visit for psychotherapy      New symptoms or complaints: \" Evenings are hard, I miss him so much\"    Functional Impairment:   Personal: 3  Family: 3  Work: 0  Social:3        ASSESSMENT: Current Emotional / Mental Status (status of significant symptoms):              Risk status (Self / Other harm or suicidal ideation)              Patient denies safety issues              Patient denies current or recent suicidal " "ideation or behaviors.              Patient denies current or recent homicidal ideation or behaviors.              Patient denies current or recent self injurious behavior or ideation.              Patient denies other safety concerns.              Patient denies change in risk factors              Patient denies change in protective factors              Recommended that patient call 911 or go to the local ED should there be a change in any of these risk factors.                Attitude:                                   Cooperative               Orientation:                             3X              Speech                          Rate / Production:       Normal/ Responsive                          Volume:                       Normal               Mood:                                      Sad  Grieving              Thought Content:                    Clear               Thought Form:                        Coherent  Logical               Insight:                                     Good     Patient's impression of their current status: Patient shares she continues to struggle after losing her . Evenings are hard. She stay up late until tired to be able to fall asleep but won't stay long. Sleeps up 4-5 hours.  Family has been checking on her daily and they have been available meeting for meals. Still has not found a place for support group due to the Pandemic. Patient is doing well sharing her feelings. She is working on her Ciara as she has been \"very mad at God\". Sessions are helping with processing these feelings.     Therapist impression of patients current state: Writer assessed patient's feelings. Processed her thoughts and feelings. Allowed her express her feelings and invited her to keep up and reflect on today's discussion. It is common for someone to question about God's purpose in life when someone is grieving. Writer will continue working with patient regaining her ciara.    Type of " psychotherapeutic technique provided: Insight oriented, Client centered, Solution-focused, CBT and grief processing    Progress toward short term goals: ongoing grief. though able to share her feelings     Progress as expected, Pt discussing concerns and coping strategies during tele-sessions. Pt working on homework assignments and skills learned in therapy between sessions    Review of long term goals: Initial treatment plan:10/28/2020 next review: 1/28/2021  Diagnosis:  1. Grief reaction    2. Adjustment disorder with anxiety      Plan and Follow up:   Patient will keep in touch with family. Patient will also keep up with positivity. Her next visit is in a week.     Discharge Criteria/Planning: Patient will continue with follow-up until therapy can be discontinued without return of signs and symptoms.    I have reviewed the note as documented above.  This accurately captures the substance of my conversation with the patient.  As the provider I attest to compliance with applicable laws and regulations related to telemedicine.  Performed and documented by CHASITY Newell- JOAQUIM; [facility] 1/20/2021

## 2021-06-15 NOTE — PROGRESS NOTES
2/22/2021   Clinic Care Coordination Contact    Community Health Worker Follow Up    Goals:   Goals Addressed                 This Visit's Progress       Patient Stated      Mental Health Management (pt-stated)   90%     Goal Statement: I want to be aware of support groups in my area (grief,  recently in memory care) within 1-2 months  Date Goal set: 09/08/20  Barriers: Unsure of resources  Strengths: Children support  Date to Achieve By: 11/08/2020  Patient expressed understanding of goal: yes  Action steps to achieve this goal:  1. I continue to  talk with therapist for support every 2 weeks and will ask the therapist if she knows of any virtual senior support group.  2. I will continue to connect with family members on a regular bases for support.  3. I will update [facility] team at outreach.    Updated: 2-22-21 AL        Other (pt-stated)   80%     .Goal Statement: I would like support to get information on where I can get the COVID-19 vaccine within the next 2 months.  Date Goal set: 1/25/2021  Barriers: limited info regarding COVID vaccine  Strengths: motivated to get the vaccine  Date to Achieve By: 3-21-21  Patient expressed understanding of goal: yes  Action steps to achieve this goal:  1. I received COVID vaccine on 2-20-21 and I will wait for the clinic to call to schedule for the 2nd shot.  2.  I will update CHW at next outreach.      Updated: 2-22-21.            Called and spoke to patient to follow up if she has appt for COVID vaccine and update on goals.  Patient reported:  -had 1st COVID vaccine on 2-20-21 at [facility] on [address] [place] [address]   -the clinic will send a letter or call for the 2nd shot.  Patient will wait to get a call to schedule for 2nd shot.  -continue to talk to therapist every 2 weeks.        CHW Follow up: Monthly    CHW Plan: CHW research virtual support group for patient and send message to therapist if any resources for patient.  Follow up on goals    CHW Next Follow Up:  3-24-21

## 2021-06-15 NOTE — PROGRESS NOTES
[facility]   Knee Initial Evaluation    Patient Name: Laine Sharma  Date of evaluation: 1/8/2018  Referral Diagnosis: Shoulder pain, left  Referring provider: Abdulkadir Rodriguez, *  Visit Diagnosis:     ICD-10-CM    1. Bilateral chronic knee pain M25.561     M25.562     G89.29    2. Chronic left shoulder pain M25.512     G89.29    3. Shoulder stiffness, left M25.612    4. Decreased strength R53.1        Assessment:      Impairments in  pain, posture, ROM, joint mobility, strength, gait/locomotion and balance  Patient's signs and symptoms are consistent with left shoulder pain and limited AROM that is residual from previous fracture and bilateral knee pain consistent with OA..  Patient responded well to manual therapy and exercise..  The POC is dynamic and will be modified on an ongoing basis.  Prognosis to achieve goals is  good   Pt. is appropriate for skilled PT intervention as outlined in the Plan of Care (POC).    Goals:  Pt. will demonstrate/verbalize independence in self-management of condition in : 6 weeks  Pt. will improve posture : and demonstrate posture with minimal to no cuing;in 6 weeks  Pt. will be able to walk : on uneven/inclined surfaces;for household mobility;for exercise/recreation;with less pain;with less difficulty;in 6 weeks  Patient will ascend / descend: stairs;with railing;with recipricol gait;with less pain;with less difficulty;in 6 weeks  Patient will transfer: sit/stand;floor/stand;with less pain;with less difficulty;in 6 weeks  Patient will reach / maintain arm movement: forward;overhead;behind;for home chores;for dressing;with less pain;with less difficulty;in 6 weeks  No Data Recorded    Patient's expectations/goals and POC were created in cooperation with the therapist and patient, and they are realistic.    Barriers to Learning or Achieving Goals:  No Barriers.       Plan / Patient Instructions:      Plan of Care:   Authorization / Certification Start Date:  18  Authorization / Certification End Date: 18  Authorization / Certification Number of Visits: 12  Communication with: Referral Source  Patient Related Instruction: Nature of Condition;Treatment plan and rationale;Self Care instruction;Basis of treatment;Expected outcome;Next steps;Precautions;Posture;Body mechanics  Times per Week: 2x/week for 4 weeks then 1x/week for 4 weeks  Number of Weeks: 8  Number of Visits: 12  Discharge Planning:  to include walking program and HEP  Precautions / Restrictions : HTN, Hyperlipidemia.  Therapeutic Exercise: ROM;Stretching;Strengthening  Neuromuscular Reeducation: posture;balance/proprioception;core  Manual Therapy: soft tissue mobilization;myofascial release;joint mobilization;muscle energy  Modalities: hot pack;cold pack;ultrasound (trials as appropriate)    Plan for next visit: Nustep, review HEP, add more LE strength as appropriate, pulleys, add shoulder ROM, continue manual therapy.     Subjective:          History of Present Illness:    Laine is a 84 y.o. female who presents to therapy today with complaints of left shoulder and left>right knee pain. Date of onset/duration of symptoms is 2017. Onset was gradual. Symptoms are constant and not improving. She reports the shoulder pain residual from old break. She describes their previous level of function as not limited    Pain Ratin  Pain rating at best: 2  Pain rating at worst: 8  Pain description:aching, dull, pain, soreness and stiffness    Functional limitations are described as occurring with:   ascending and descending stairs or curbs  balance  lifting  standing    transitional movements sit to stand and sit to supine  walking           Objective:      Note: Items left blank indicates the item was not performed or not indicated at the time of the evaluation.    Patient Outcome Measures :    Shoulder Pain and Disability Index (SPADI) in %: 60.77   Scores range from 0-100%, where a score of 0%  represents minimal pain and maximal function. The minimal clincically important difference is a score reduction of 10%.    Knee Examination  1. Bilateral chronic knee pain     2. Chronic left shoulder pain     3. Shoulder stiffness, left     4. Decreased strength     Precautions / Restrictions : HTN, Hyperlipidemia.   Involved Side: Bilateral  Posture Observation:      General sitting posture is  fair.  General standing posture is fair.  Cervical:  Moderate forward head  Shoulder/Thoracic complex: Moderate bilateral scapular protraction   Assistive Device: None  Gait Observation: antalgic gait  Lumbar Clearing: Does not provoke symptoms  Hip Clearing: Does not provoke symptoms    Knee ROM       Date:  1/8/18    AROM in degrees  Right   Left  Right   Left  Right   Left       Knee Flexion  (130 )   115   110                   Knee Extension  (0 )   0 0                   PROM in degrees  Right   Left  Right   Left  Right   Left       Knee Flexion  (130 )                         Knee Extension  (0 )                       LE Strength                    Date:  1/8/18   Strength (MMT/5)  Right   Left       Hip Flexion   4 4         Hip Abduction   4  4       Hip Adduction   4-  4-        Hip Extension   4 4         Hip Internal Rotation             Hip External Rotation             Knee Extension   5  4+        Knee Flexion   4  4-        Ankle Dorsiflexion   5  5        Ankle Plantarflexion           Flexibility & Palpation:  Limited and restricted quad, hamstrings, gastroc.    Knee Special Tests (+/-):       Knee OA Cluster   Right   Left   Ligament Tests   Right   Left    1. > 49 y/o   +   +     Lachman   -   -    2. Stiffness > 30 min.   +   +     Anterior Drawer          3. Crepitus   +   +     Posterior Drawer          4. Bony tenderness   -   -     Posterior Sag          5. Bone enlargement   +   +     Valgus Stress   -   -    6. No warmth to the touch   +   +     Varus Stress   -   -     Meniscal Tests   Right    "Left    Other   Right    Left       Jasmine'camron Bernard's             Joint line tenderness           Tin             Thessaly Thomas Apley's                      Shoulder/Elbow ROM  Date: 1/8/18     Shoulder and Elbow ROM ( )   AROM in degrees AROM in degrees AROM in degrees    Right Left Right Left Right Left   Shoulder Flexion (0-180 ) 150 90       Shoulder Abduction (0-180 ) 110 80       Shoulder Extension (0-60 ) 45 45       Shoulder ER (0-90 ) 25 15       Shoulder IR (0-70 )         Shoulder IR/EXT         Elbow Flexion (150 ) WNL WNL       Elbow Extension (0 ) WNL WNL           Shoulder/Elbow Strength  Date: 1/8/18     Shoulder/Elbow Strength (/5)  Manual Muscle Test (MMT) MMT MMT MMT    Right Left Right Left Right Left   Shoulder Flexion 4 4       Supraspinatus         Shoulder Abduction 4 4       Shoulder Extension         Shoulder External Rotation 5 5       Shoulder Internal Rotation 5 5       Elbow Flexion 5 5       Elbow Extension 5 5       Other:         Other:             Shoulder Special Tests  Impingement Cluster Right (+/-) Left (+/-) Rotator Cuff Tests Right (+/-) Left (+/-)   Romero-Dax  - Drop Arm Sign  -   Painful Arc  - Hornblowers     Infraspinatus Test  - ERLS     AC Tests Right (+/-) Left (+/-) IRLS     Active Compression   Labral Tests Right (+/-) Left (+/-)   Crossbody Adduction  + Biceps Load Test II     AC Resisted Extension   Jerk Test     GH Instability Tests Right (+/-) Left (+/-) Yolande Test     Sulcus Sign  - Biceps Tests Right (+/-) Left (+/-)   Apprehension   Speed     Relocation   Aisha lyon     Other:   Other:     Other:   Other:         Treatment Today        Therapeutic Exercises:  Exercise #1: Heel/Toe raises  Comment #1: 10  Exercise #2: gastroc stretch  Comment #2: 30\" x 2 bilateral  Exercise #3: standing knee flexion  Comment #3: 10 bilateral  Exercise #4: sittin LAQ  Comment #4: 10 bilateral       Manual therapy:  MFR layers 1-3 left:  Quad, " hamstring, gastroc.    Manual therapy:  left patella inferior mobilization for knee flexion    Rate/grade Target  Direction  Relative movement Location in range Patient position   2,3 Superior pole of patella Inferior    Inferior glide of patella in the femoral groove. Varying degrees of knee flexion from full extension to end-range flexion. Supine        Manual therapy:  left patella medial mobilization    Rate/grade Target  Direction  Relative movement Location in range Patient position   2,3 Lateral border of patella Medial   Medial glide of patella on the femoral groove. Full knee extension  Supine       Manual therapy:  left knee distraction in extension.    Rate/grade Target  Direction  Relative movement Location in range Patient position   3 Hands around tibia and fibula of lower leg. Inferior force  Inferior distraction of tibial plateau from femoral condyles Full knee extension. Supine               TREATMENT MINUTES COMMENTS   Evaluation 30    Self-care/ Home management     Manual therapy 20    Neuromuscular Re-education     Therapeutic Activity     Therapeutic Exercises 10    Gait training     Modality__________________                Total 60    Blank areas are intentional and mean the treatment did not include these items.     PT Evaluation Code: (Please list factors)  Patient History/Comorbidities: see problem list  Examination: as above  Clinical Presentation: stable  Clinical Decision Making: low    Patient History/  Comorbidities Examination  (body structures and functions, activity limitations, and/or participation restrictions) Clinical Presentation Clinical Decision Making (Complexity)   No documented Comorbidities or personal factors 1-2 Elements Stable and/or uncomplicated Low   1-2 documented comorbidities or personal factor 3 Elements Evolving clinical presentation with changing characteristics Moderate   3-4 documented comorbidities or personal factors 4 or more Unstable and unpredictable  High                Joseph Ramirez  1/8/2018  9:45 AM

## 2021-06-15 NOTE — PROGRESS NOTES
"Mental Health Psychotherapy Telephone Note    Patient: Laine Sharma    : 1933 MRN: [id number]    Completed a 2 point identification verification: patient verbalized full name and date of birth.     Date: 3/10/2021  Start time: 8:25  Stop Time: 8:58 Session # 6    The patient has been notified of the following:   \"We have found that certain health care needs can be provided without the need for a face to face visit.  This service lets us provide the care you need with a phone conversation.  I will have full access to your [facility] medical record during this entire phone call.   I will be taking notes for your medical record. Since this is like an office visit, we will bill your insurance company for this service.  There are potential benefits and risks of telephone visits (e.g. limits to patient confidentiality) that differ from in-person visits.?  Confidentiality still applies for telephone services, and nobody will record the visit.  It is important to be in a quiet, private space that is free of distractions (including cell phone or other devices) during the visit.?? If during the course of the call I believe a telephone visit is not appropriate, you will not be charged for this service\"  Consent has been obtained for this service by care team member: Yes, per verbal agreement   Session Type: Patient is participating in a telemedicine phone visit: No device for video    Those present for this session:  Patient and therapist    Chief Complaint   Patient presents with      Follow Up     Telephone visit for psychotherapy      New symptoms or complaints: \"Grief, nights are hard\"  PHQ9:0  GAD7:0    Functional Impairment:   Personal: 3  Family: 3  Work: 0  Social:3        ASSESSMENT: Current Emotional / Mental Status (status of significant symptoms):              Risk status (Self / Other harm or suicidal ideation)              Patient denies safety issues              Patient denies current or recent suicidal " "ideation or behaviors.              Patient denies current or recent homicidal ideation or behaviors.              Patient denies current or recent self injurious behavior or ideation.              Patient denies other safety concerns.              Patient denies change in risk factors              Patient denies change in protective factors              Recommended that patient call 911 or go to the local ED should there be a change in any of these risk factors.                Attitude:                                   Cooperative               Orientation:                             3X              Speech                          Rate / Production:       Normal/ Responsive Normal                           Volume:                       Normal               Mood:                                      Normal              Thought Content:                    Clear               Thought Form:                        Coherent  Logical               Insight:                                     Good     Patient's impression of their current status: Patient is doing relatively well. Thought she continues to have hard time in evenings. She is used to play games and spend times with her late . Has some relative from [place] which she finds is helping her. She agreed to challenge some thoughts around blaming  for not telling her that he was sick. She has many \" whys\" and wishes to find \" becauses\" she also finds it is hurting her since she can finds all her answers since  passed away. She agreed to find her own explanation focusing on positive moments with late . Over all, she is doing pretty well just moving toward her acceptance.     Therapist impression of patients current state: Writer assessed patient's safety. Reviewed previous session. Processed some of the feelings around the whys. Patient was encouraged to challenge herself avoiding to questions and feelings that some ow  did not tell " "him about his sickness. Over all, she is responding well to active listening, empathy and some gentle thought  challenge.     Type of psychotherapeutic technique provided: Insight oriented, Client centered, Solution-focused and CBT    Progress toward short term goals: Reports ability to let go some of the stressful situations    Progress as expected, Pt discussing concerns and coping strategies during tele-sessions. Pt working on homework assignments and skills learned in therapy between sessions    Review of long term goals:Initial treatment plan: 2/04/2021 next review: 5/05/2021    Diagnosis:  1. Grief reaction    2. Adjustment disorder with anxiety      Plan and Follow up:    Patient will challenge the \"whys\" using the tips discussed in the session.   Patient will also keep in touch with her family  Her next visit is in a week.     Discharge Criteria/Planning: Patient will continue with follow-up until therapy can be discontinued without return of signs and symptoms.          I have reviewed the note as documented above.  This accurately captures the substance of my conversation with the patient.  As the provider I attest to compliance with applicable laws and regulations related to telemedicine.  Performed and documented by CHASITY Newell- JOAQUIM; [facility] 3/10/2021  "

## 2021-06-15 NOTE — PROGRESS NOTES
"Mental Health Psychotherapy Telephone Note    Patient: Laine Sharma    : 1933 MRN: [id number]    Completed a 2 point identification verification: patient verbalized full name and date of birth.     Date: 3/03/2021  Start time: 10:02  Stop Time: 10:48   Session # 5    The patient has been notified of the following:   \"We have found that certain health care needs can be provided without the need for a face to face visit.  This service lets us provide the care you need with a phone conversation.  I will have full access to your [facility] medical record during this entire phone call.   I will be taking notes for your medical record. Since this is like an office visit, we will bill your insurance company for this service.  There are potential benefits and risks of telephone visits (e.g. limits to patient confidentiality) that differ from in-person visits.?  Confidentiality still applies for telephone services, and nobody will record the visit.  It is important to be in a quiet, private space that is free of distractions (including cell phone or other devices) during the visit.?? If during the course of the call I believe a telephone visit is not appropriate, you will not be charged for this service\"  Consent has been obtained for this service by care team member: Yes, per verbal agreement   Session Type: Patient is participating in a telemedicine phone visit: No device for Video    Those present for this session: Patient and therapist    Chief Complaint   Patient presents with      Follow Up     Telephone visit for psychotherapy      New symptoms or complaints: Family issues, grief  PHQ9:0  GAD7:0    Functional Impairment:   Personal: 3  Family: 3  Work: 0  Social:3        ASSESSMENT: Current Emotional / Mental Status (status of significant symptoms):              Risk status (Self / Other harm or suicidal ideation)              Patient denies safety issues              Patient denies current or recent suicidal " ideation or behaviors.              Patient denies current or recent homicidal ideation or behaviors.              Patient denies current or recent self injurious behavior or ideation.              Patient denies other safety concerns.              Patient denies change in risk factors              Patient denies change in protective factors              Recommended that patient call 911 or go to the local ED should there be a change in any of these risk factors.                Attitude:                                   Cooperative               Orientation:                             3X              Speech                          Rate / Production:       Normal/ Responsive                          Volume:                       Normal               Mood:                                      Normal              Thought Content:                    Clear               Thought Form:                        Coherent  Logical               Insight:                                     Good     Patient's impression of their current status: Patient reports feeling sad again this last week.  would have turned 88 last Sunday. Patient and step son went to the cemetary to honor him. She also stated she feels she is accepting the reality slowly. Continues to have some issues with some family members. Though keeps herself busy with friends and family. Step son found 's death certificate. He had declined he took it. Patient had renewed her 's license. Plans to have her vaccine this weekend. No other issues this time. Excited about getting her second vaccine this weekend.   Will get another phone visit in a week.     Therapist impression of patients current state: Writer processed some feelings related to the patient's loss. She is communicating well her emotional needs and was receptive of the different interventions used today. Was encouraged to try outdoor activities that include walks weather permits.      Type of psychotherapeutic technique provided: Insight oriented, Client centered, Solution-focused and CBT, grief processing    Progress toward short term goals: Progress as expected, Pt discussing concerns and coping strategies during tele-sessions. Pt working on homework assignments and skills learned in therapy between sessions    Review of long term goals: Treatment plan updated: 2/04/2021 next review: 5/05/2021     Diagnosis:  1. Grief reaction    2. Adjustment disorder with anxiety      Plan and Follow up:   Patient will take some walk weather permits.   Patient will stay in touch with family and friends  Patient's next visit is in a week.     Discharge Criteria/Planning: Patient will continue with follow-up until therapy can be discontinued without return of signs and symptoms.    I have reviewed the note as documented above.  This accurately captures the substance of my conversation with the patient.  As the provider I attest to compliance with applicable laws and regulations related to telemedicine.  Performed and documented by CHASITY Newell- JOAQUIM; [facility] 3/3/2021

## 2021-06-15 NOTE — PROGRESS NOTES
[facility] Daily Progress     Patient Name: Laine Sharma  Date: 1/11/2018  Visit #: 2/12  Authorization dates:  1/11/18-3/14/18  Referral Diagnosis:    Shoulder pain, left [M25.512]       Bilateral anterior knee pain [M25.561, M25.562]       Osteoarthritis [M19.90]         Referring provider: Abdulkadir Rodriguez, *  Visit Diagnosis:     ICD-10-CM    1. Bilateral chronic knee pain M25.561     M25.562     G89.29    2. Chronic left shoulder pain M25.512     G89.29    3. Shoulder stiffness, left M25.612    4. Decreased strength R53.1        Precautions / Restrictions : HTN, Hyperlipidemia.     Assessment:     Response to Intervention:  Very good today and good overall progress.    Symptoms are consistent with:   left shoulder pain and limited AROM that is residual from previous fracture and bilateral knee pain consistent with OA..    Patient is appropriate to continue with skilled physical therapy intervention, as indicated by initial plan of care.    Goal Status:  Pt. will demonstrate/verbalize independence in self-management of condition in : 6 weeks  Pt. will improve posture : and demonstrate posture with minimal to no cuing;in 6 weeks  Pt. will be able to walk : on uneven/inclined surfaces;for household mobility;for exercise/recreation;with less pain;with less difficulty;in 6 weeks  Patient will ascend / descend: stairs;with railing;with recipricol gait;with less pain;with less difficulty;in 6 weeks  Patient will transfer: sit/stand;floor/stand;with less pain;with less difficulty;in 6 weeks  Patient will reach / maintain arm movement: forward;overhead;behind;for home chores;for dressing;with less pain;with less difficulty;in 6 weeks  No Data Recorded  Other functional progress:           Plan / Patient Education:     Continue with initial plan of care.  Progress with home program as tolerated.      Subjective:     Pain Rating:  Resting 2  Activity:  3    Response to last treatment: very good  HEP-  "Frequency: 2x/day, Questions or difficulties:  none.    Patient reports:      Much less leg pain    She is able to walk easier.    She is able to move sit to stand easier.    She feels her balance has improved.      Objective:           Treatment Today    Manual Therapy  MFR layers 1-3 bilateral: quads, hamstrings, Left: upper trap, infraspinatus, lat dorsi, pec major and minor    Manual therapy:  left shoulder inferior mobilization    Rate/grade Target  Direction  Relative movement Location in range Patient position   2,3 Humeral head Inferior  Inferior glide of humeral head on glenoid.  neutral  Supine       Manual therapy:  left shoulder posterior mobilization    Rate/grade Target  Direction  Relative movement Location in range Patient position   2,3 Humeral head Posterior  Posterior glide of humeral head on glenoid.  neutral  Supine           Therapeutic Exercises:  Exercise #1: Heel/Toe raises  Comment #1: 10 for HEP, verbal review today  Exercise #2: gastroc stretch  Comment #2: 30\" x 2 bilateral for HEP, verbal review today  Exercise #3: standing knee flexion  Comment #3: 10 bilateral  for HEP, verbal review today  Exercise #4: sittin LAQ  Comment #4: 10 bilateral  for HEP, verbal review today  Exercise #5: Nustep  seat 5 handles 8  Comment #5: resistance: 4, minutes:  4  Exercise #6: UBE  Comment #6: forward 3 minutes  Exercise #7: pulleys  Comment #7: flexion:  3, abduction: 3   Exercise #8: shoulder extension, flexion, abduction  Comment #8:  orange bilateral x 10 each         TREATMENT MINUTES COMMENTS   Evaluation     Self-care/ Home management     Manual therapy 30 See above.   Neuromuscular Re-education     Therapeutic Activity     Therapeutic Exercises 25 See exercise flow-sheet for details.    Gait training     Modality__________________                Total 55    Blank areas are intentional and mean the treatment did not include these items.       Joseph Ramirez, PT  1/11/2018     "

## 2021-06-15 NOTE — PROGRESS NOTES
"Mental Health Psychotherapy Telephone Note    Patient: Laine Sharma    : 1933 MRN: [id number]    Completed a 2 point identification verification: patient verbalized full name and date of birth.     Date: 2021  Start time: 11:07   Stop Time: 11:56  Session # 4    The patient has been notified of the following:   \"We have found that certain health care needs can be provided without the need for a face to face visit.  This service lets us provide the care you need with a phone conversation.  I will have full access to your [place] medical record during this entire phone call.   I will be taking notes for your medical record. Since this is like an office visit, we will bill your insurance company for this service.  There are potential benefits and risks of telephone visits (e.g. limits to patient confidentiality) that differ from in-person visits.?  Confidentiality still applies for telephone services, and nobody will record the visit.  It is important to be in a quiet, private space that is free of distractions (including cell phone or other devices) during the visit.?? If during the course of the call I believe a telephone visit is not appropriate, you will not be charged for this service\"  Consent has been obtained for this service by care team member: Yes, per verbal agreement   Session Type: Patient is participating in a telemedicine phone visit: No device for psychotherapy    Those present for this session: Patient and therapist    Chief Complaint   Patient presents with      Follow Up     Telephone visit for psychotherapy     New symptoms or complaints:  PHQ-9: 0 ;GAD7: 0; CAGE AID: 0/4; WHODAS 2:0: 4 %; C-SSRS: 0 also used to update her treatment plan    Functional Impairment:   Personal: 3  Family: 3  Work: 3  Social:3        ASSESSMENT: Current Emotional / Mental Status (status of significant symptoms):              Risk status (Self / Other harm or suicidal ideation)              Patient denies " safety issues              Patient denies current or recent suicidal ideation or behaviors.              Patient denies current or recent homicidal ideation or behaviors.              Patient denies current or recent self injurious behavior or ideation.              Patient denies other safety concerns.              Patient denies change in risk factors              Patient denies change in protective factors              Recommended that patient call 911 or go to the local ED should there be a change in any of these risk factors.                Attitude:                                   Cooperative               Orientation:                             3X              Speech                          Rate / Production:       Normal/ Responsive                          Volume:                       Normal               Mood:                                      Sad               Thought Content:                    Clear               Thought Form:                        Coherent  Logical               Insight:                                     Good     Patient's impression of their current status: Patient reports she has been doing reasonably well. She remains in touch with family. Missed her appointment yesterday as she was with her step son helping him with some personal issues. Patient continues to experience a tough time during the night but is also getting used to this moment slowly. Has been experiencing some sadness when it comes to accessing late 's belongings and acknowledged it takes some time. Has not started writing but still feels it is a good idea. Patient also finds it helps to process her grief in sessions. Her next visit is in a week.     Therapist impression of patients current state: Writer assessed the patient's safety. Processed her feelings as they presented. Patient appears to be doing better with her grief. She   Responds well to input encouraging her to keep up with positivity and  hope and to focus on what works in this grief process. Patient was encouraged to keep up with self care and connection with her beloved ones.     Type of psychotherapeutic technique provided: Insight oriented, Client centered, Solution-focused, CBT and Grief processing    Progress toward short term goals: Progress as expected, continues to improve in her grief process. Progress as expected, Pt discussing concerns and coping strategies during tele-sessions. Pt working on homework assignments and skills learned in therapy between sessions    Review of long term goals: Treatment plan updated: 2/4/2021     Diagnosis:  1. Grief reaction    2. Adjustment disorder with anxiety      Plan and Follow up:   Patient will keep in touch with family. Will do her devotion. Will do some free writing about her past life with  when she feels lonely. Will have her next visit in a week.     Discharge Criteria/Planning: Patient will continue with follow-up until therapy can be discontinued without return of signs and symptoms.    I have reviewed the note as documented above.  This accurately captures the substance of my conversation with the patient.  As the provider I attest to compliance with applicable laws and regulations related to telemedicine.  Performed and documented by CHASITY Newell- JOAQUIM; [facility] 2/4/2021

## 2021-06-15 NOTE — PROGRESS NOTES
"Mental Health Psychotherapy Telephone Note    Patient: Laine Sharma    : 1933 MRN: [id number]    Completed a 2 point identification verification: patient verbalized full name and date of birth.     Date: 2021  Start time: 9:05   Stop Time: 9:55   Session #  4    The patient has been notified of the following:   \"We have found that certain health care needs can be provided without the need for a face to face visit.  This service lets us provide the care you need with a phone conversation.  I will have full access to your [place] medical record during this entire phone call.   I will be taking notes for your medical record. Since this is like an office visit, we will bill your insurance company for this service.  There are potential benefits and risks of telephone visits (e.g. limits to patient confidentiality) that differ from in-person visits.?  Confidentiality still applies for telephone services, and nobody will record the visit.  It is important to be in a quiet, private space that is free of distractions (including cell phone or other devices) during the visit.?? If during the course of the call I believe a telephone visit is not appropriate, you will not be charged for this service\"  Consent has been obtained for this service by care team member: Yes, per verbal agreement   Session Type: Patient is participating in a telemedicine phone visit: No device for video.    Those present for this session:  Patient and therapist    Chief Complaint   Patient presents with     MH Follow Up     Telephone visit for psychotherapy      New symptoms or complaints:  Grief  PHQ9:0  GAD7:0    Functional Impairment:   Personal: 3  Family: 3  Work: 0  Social:3    ASSESSMENT: Current Emotional / Mental Status (status of significant symptoms):              Risk status (Self / Other harm or suicidal ideation)              Patient denies safety issues              Patient denies current or recent suicidal ideation or " behaviors.              Patient denies current or recent homicidal ideation or behaviors.              Patient denies current or recent self injurious behavior or ideation.              Patient denies other safety concerns.              Patient denies change in risk factor              Patient denies change in protective factors              Recommended that patient call 911 or go to the local ED should there be a change in any of these risk factors.                Attitude:                                   Cooperative               Orientation:                             3X              Speech                          Rate / Production:       Normal/ Responsive                          Volume:                       Normal               Mood:                                      Normal              Thought Content:                    Clear               Thought Form:                        Coherent  Logical               Insight:                                     Good     Patient's impression of their current status:  Patient reports she is doing better. Has been able to find some sleep. Though found out that her 's  niece's  also passed away. Patient remains in touch with famiily. Still has opportunity to have breakfast with her sister in law.  She also has a friend she talk to everyday. Reports she has been active doing things around the house to keep her mind off her losses. Plans to have her 's licence renewed today. Will also call to request a second  copy of her  death certificate . Patient has not had her covid vaccine. She was told to go [facility] and had not have access yet. Her next call for therapy is in a week.     Therapist impression of patients current state: Writer assessed patient's safety. Offered condolence and processed her new loss of a family member.  Though over all, she appears to be doing well given the stress and grief she has been under. She does well  with some support allowing her to process her grief, validation of her feelings and encouragement.     Type of psychotherapeutic technique provided: Insight oriented, Client centered, Solution-focused and CBT, grief processing.     Progress toward short term goals: Progress as expected, processing well her grief.     Progress as expected, Pt discussing concerns and coping strategies during tele-sessions. Pt working on homework assignments and skills learned in therapy between sessions    Review of long term goals:Treatment plan updated:  2/04/2021 next review: 5/05/2021     Diagnosis:  1. Grief reaction    2. Adjustment disorder with anxiety      Plan and Follow up: Patient will request a second copy of her  death certificate.  Patient plans to go the local [facility] for her new 's license. Patient will keep in touch with her friends and family. Patient will practice some meditation to keep her mind together, here and now. Patient's next visit is in a week.     Discharge Criteria/Planning: Patient will continue with follow-up until therapy can be discontinued without return of signs and symptoms.    I have reviewed the note as documented above.  This accurately captures the substance of my conversation with the patient.  As the provider I attest to compliance with applicable laws and regulations related to telemedicine.  Performed and documented by CHASITY Newell- JOAQUIM; [facility] 2/17/2021

## 2021-06-15 NOTE — PROGRESS NOTES
[facility] Daily Progress     Patient Name: Laine Sharma  Date: 1/16/2018  Visit #: 3/12  Authorization dates:  1/11/18-3/14/18  Referral Diagnosis:    Shoulder pain, left [M25.512]       Bilateral anterior knee pain [M25.561, M25.562]       Osteoarthritis [M19.90]         Referring provider: Abdulkadir Rodriguez, *  Visit Diagnosis:     ICD-10-CM    1. Bilateral chronic knee pain M25.561     M25.562     G89.29    2. Chronic left shoulder pain M25.512     G89.29    3. Shoulder stiffness, left M25.612    4. Decreased strength R53.1        Precautions / Restrictions : HTN, Hyperlipidemia.     Assessment:     Response to Intervention:  Excellent overall progress.  Decreased pain improving function.    Symptoms are consistent with:   left shoulder pain and limited AROM that is residual from previous fracture and bilateral knee pain consistent with OA..    Patient is appropriate to continue with skilled physical therapy intervention, as indicated by initial plan of care.    Goal Status:  Pt. will demonstrate/verbalize independence in self-management of condition in : 6 weeks  Pt. will improve posture : and demonstrate posture with minimal to no cuing;in 6 weeks  Pt. will be able to walk : on uneven/inclined surfaces;for household mobility;for exercise/recreation;with less pain;with less difficulty;in 6 weeks  Patient will ascend / descend: stairs;with railing;with recipricol gait;with less pain;with less difficulty;in 6 weeks  Patient will transfer: sit/stand;floor/stand;with less pain;with less difficulty;in 6 weeks  Patient will reach / maintain arm movement: forward;overhead;behind;for home chores;for dressing;with less pain;with less difficulty;in 6 weeks  No Data Recorded  Other functional progress:    She is able to walk easier.    She is able to move sit to stand easier.    She feels her balance has improved.         Plan / Patient Education:     Continue with initial plan of care.  Progress with home  "program as tolerated.      Subjective:     Pain Rating:  Resting 2  Activity:  2    Response to last treatment: very good  HEP- Frequency: 0-1x/day, Questions or difficulties:  she has had company and was not able to do the exercises as often as she would have liked..    Patient reports:      Much less leg pain    She notice going up the stairs is much easier.    She had some arm pain with knitting yesterday and decided to hold off.      Objective:           Treatment Today    Manual Therapy  MFR layers 1-3 bilateral: quads, hamstrings, Left: upper trap, scalenes, infraspinatus, lat dorsi, pec major and minor    Manual therapy:  left shoulder inferior mobilization    Rate/grade Target  Direction  Relative movement Location in range Patient position   2,3 Humeral head Inferior  Inferior glide of humeral head on glenoid.  neutral  Supine       Manual therapy:  left shoulder posterior mobilization    Rate/grade Target  Direction  Relative movement Location in range Patient position   2,3 Humeral head Posterior  Posterior glide of humeral head on glenoid.  neutral  Supine           Therapeutic Exercises:  Exercise #1: Heel/Toe raises-cues for not leaning back on toe raises  Comment #1: 15 bilateral  Exercise #2: gastroc stretch  cues for hold  Comment #2: 30\" x 2 bilateral   Exercise #3: standing knee flexion  Comment #3: 10 bilateral  for HEP, verbal review today  Exercise #4: sittin LAQ  Comment #4: 10 bilateral  for HEP, verbal review today  Exercise #5: Nustep  seat 5 handles 8  Comment #5: resistance: 5, minutes:  3  Exercise #6: UBE-some lateral arm discomfort on left  Comment #6: forward 2 minutes, backward 2 minutes  Exercise #7: pulleys  Comment #7: patient has home pulleys and is using her pulleys at home  Exercise #8: shoulder extension, flexion, abduction  Comment #8:  orange bilateral x 15 each  Exercise #9: sit to stand without UE lowest east table  Comment #9: 10  Exercise #10: bridging   Comment #10: " 10  Exercise #11: partial curl ups  Comment #11: 10         TREATMENT MINUTES COMMENTS   Evaluation     Self-care/ Home management     Manual therapy 30 See above.   Neuromuscular Re-education     Therapeutic Activity     Therapeutic Exercises 25 See exercise flow-sheet for details.    Gait training     Modality__________________                Total 55    Blank areas are intentional and mean the treatment did not include these items.       Joseph Ramirez, PT  1/16/2018

## 2021-06-15 NOTE — PROGRESS NOTES
Outpatient Mental Health Treatment Plan    Name:  Laine Sharma  :  1933  MRN:  [id number]     I've re-evaluated this with the patient and no changes/ changes were noted from 10/28/2020 to today    Treatment Plan:  Updated Treatment Plan  Intake/initial treatment plan date:  10/28/2020  Benefit and risks and alternatives have been discussed: Yes  Is this treatment appropriate with minimal intrusion/restrictions: Yes  Estimated duration of treatment:  10+  Anticipated frequency of services:  Every week.  Necessity for frequency: This frequency is needed to establish therapeutic goals and for continuity of care in order to monitor progress.  Necessity for treatment: To address cognitive, behavioral, and/or emotional barriers in order to work toward goals and to improve quality of life.    Session Type: Patient is presenting for an Individual session.    Plan:   Grief and Loss    Goal: Accept the loss of beloved ones and return to stable level of functioning as evidenced by acceptance     Strategies:    ? [x]  Express unresolved emotions regarding losses  ? [x]  Display an understanding of the grief process and how this process may be exacerbated by or may exacerbate mental illness symptoms   ?[x]  Develop an understanding of how avoidance of the grief process may affect functioning in all areas   ?[x]  Develop alternative diversions and other coping mechanisms that are related to loss issues   ?[x]  Resolve spiritual conflict   ?[x]  Redevelop a supportive social system and improve interpersonal relationships      Degree to which this is a problem (1-4): 3    Degree to which goal is met (1-4):2    Date of  Next Review: 2021       ?   ? Anxiety    Goal:  Decrease average anxiety level from 3 to 2.   Strategies: ? [x]Learn and practice relaxation techniques and other coping strategies (e.g., thought stopping, reframing, meditation)     ? [x] Increase involvement in meaningful activities     ? [x] Discuss sleep  hygiene     ? [x] Explore thoughts and expectations about self and others     ? [x] Identify and monitor triggers for panic/anxiety symptoms     ? [x] Implement physical activity routine (with physician approval)     ? [] Consider introduction of bibliotherapy and/or videos     ? [x] Continue compliance with medical treatment plan (or explore  barriers)                                         Degree to which this is a problem: 3  Degree to which goal is met:2  Date of  Next Review: 5/05/2021       Functional Impairment:  1=Not at all/Rarely  2=Some days  3=Most Days  4=Every Day    Personal : 3  Family : 3  Social : 3  Work/school : 0    Diagnosis:   1. Grief reaction   2.Adjustment disorder with anxiety    WHODAS 2.0 12-item version:2 or 4 %    Scores presented in qualifiers to represent level of disability.   NO Problem (none, absent, negligible,...) 0-4%    H1= 0  H2= 0  H3= 0    Clinical assessments and measures completed:. PHQ-9: 0 ;GAD7: 0; CAGE AID: 0/4; WHODAS 2:0: 4 %; C-SSRS: 0     Strengths:Patient identified the following strengths or resources that will help them succeed in treatment: Sabianist / Jew, friends / good social support, family support, insight, intelligence and strong social skills. Patient has been using these resources since the loss of her beloved one. She find them helpful.     Limitations:Things that may interfere with the patient's success in treatment include: grief : lost  and and brother in law within the last several month.  Brother has been ill and recovering slowly.  Too much to handle alone.    Cultural Considerations:   American. Family oriented. Strong in her [employer] [place]. Uses [employer]. Might needs extra support to handle household responsibilities.     Persons responsible for this plan: Patient and Provider    Psychotherapist Signature           Patient Signature:              Guardian Signature             Provider: Performed and documented by  Rosa Stafford, MSW- LICSW; [facility]   Date:  2/4/2021

## 2021-06-15 NOTE — PROGRESS NOTES
[facility] Daily Progress     Patient Name: Laine Sharma  Date: 1/18/2018  Visit #: 4/12  Authorization dates:  1/11/18-3/14/18  Referral Diagnosis:    Shoulder pain, left [M25.512]       Bilateral anterior knee pain [M25.561, M25.562]       Osteoarthritis [M19.90]         Referring provider: Abdulkadir Rodriguez, *  Visit Diagnosis:     ICD-10-CM    1. Bilateral chronic knee pain M25.561     M25.562     G89.29    2. Chronic left shoulder pain M25.512     G89.29    3. Shoulder stiffness, left M25.612    4. Decreased strength R53.1        Precautions / Restrictions : HTN, Hyperlipidemia.     Assessment:     Response to Intervention:  Excellent overall progress.  Decreased pain improving function.    Symptoms are consistent with:   left shoulder pain and limited AROM that is residual from previous fracture and bilateral knee pain consistent with OA..    Patient is appropriate to continue with skilled physical therapy intervention, as indicated by initial plan of care.    Goal Status:  Pt. will demonstrate/verbalize independence in self-management of condition in : 6 weeks  Pt. will improve posture : and demonstrate posture with minimal to no cuing;in 6 weeks  Pt. will be able to walk : on uneven/inclined surfaces;for household mobility;for exercise/recreation;with less pain;with less difficulty;in 6 weeks  Patient will ascend / descend: stairs;with railing;with recipricol gait;with less pain;with less difficulty;in 6 weeks  Patient will transfer: sit/stand;floor/stand;with less pain;with less difficulty;in 6 weeks  Patient will reach / maintain arm movement: forward;overhead;behind;for home chores;for dressing;with less pain;with less difficulty;in 6 weeks  No Data Recorded  Other functional progress:    She is able to walk easier.    She is able to move sit to stand easier.    She feels her balance has improved.    She is able to lay on her left shoulder and sleep.    She notice going up the stairs is much  easier.         Plan / Patient Education:     Continue with initial plan of care.  Progress with home program as tolerated.      Subjective:     Pain Rating:  Resting 2  Activity:  2    Response to last treatment: very good  HEP- Frequency: 0-1x/day, Questions or difficulties:  she has had company and was not able to do the exercises as often as she would have liked..    Patient reports:      Less left shoulder pain.    She is able to lay on her left shoulder and sleep.      Objective:           Treatment Today    Manual Therapy  MFR layers 1-3 bilateral: quads, hamstrings, Left: upper trap, scalenes, infraspinatus, lat dorsi,     Manual therapy:  left shoulder inferior mobilization    Rate/grade Target  Direction  Relative movement Location in range Patient position   2,3 Humeral head Inferior  Inferior glide of humeral head on glenoid.  neutral  Supine       Manual therapy:  left shoulder posterior mobilization    Rate/grade Target  Direction  Relative movement Location in range Patient position   2,3 Humeral head Posterior  Posterior glide of humeral head on glenoid.  neutral  Supine         Exercises below represent all exercise the patient has done in the clinic and HEP.  Please see today's exercise flow-sheet for specifics of today's exercise performance.   Therapeutic Exercises:  Exercise #1: Heel/Toe raises-cues for not leaning back on toe raises  Comment #1: 20 bilateral  Exercise #2: gastroc stretch  no hold  slow reps  Comment #2: 15 bilateral  Exercise #3: standing knee flexion  Comment #3: 10 bilateral  cues for posture and more knee flexion  Exercise #4: sittin LAQ  Comment #4: 10 bilateral  for HEP, verbal review today  Exercise #5: Nustep  seat 5 handles 8  Comment #5: resistance: 5, minutes:  4  Exercise #6: UBE-seat 5  Comment #6: forward 2 minutes, backward 2 minutes  Exercise #7: pulleys  Comment #7: patient has home pulleys and is using her pulleys at home  Exercise #8: shoulder extension,  flexion, abduction  Comment #8:  orange bilateral x 20 each flexion and extension  noted improvement in flexion,  x 10 bilateral abduction with band , 2#, and 1# each  Exercise #9: sit to stand without UE lowest east table  Comment #9: 10  Exercise #10: bridging   Comment #10: 10  Exercise #11: partial curl ups  Comment #11: 10         TREATMENT MINUTES COMMENTS   Evaluation     Self-care/ Home management     Manual therapy 25 See above.   Neuromuscular Re-education     Therapeutic Activity     Therapeutic Exercises 30 See exercise flow-sheet for details.    Gait training     Modality__________________                Total 55    Blank areas are intentional and mean the treatment did not include these items.       Joseph Ramirez, PT  1/18/2018

## 2021-06-15 NOTE — TELEPHONE ENCOUNTER
----- Message from Abdulkadir Rodriguez MD sent at 2/17/2021 12:55 PM CST -----  Regarding: FW: Covid vaccine  Please contact patient with phone number and instructions on when to call to get covid vaccine  ----- Message -----  From: Rosa Stafford LICSW  Sent: 2/17/2021   9:18 AM CST  To: Abdulkadir Rodriguez MD  Subject: Covid vaccine                                    Hi Doctor Jennifer,   I had Laine today and she told me that she has not had her vaccine yet. I am wondering if this something you are doing or if you have some guidance for her to get one.   Thank you  Rosa
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
## 2021-06-15 NOTE — PROGRESS NOTES
[facility] Daily Progress     Patient Name: Laine Sharma  Date: 1/22/2018  Visit #: 5/12  Authorization dates:  1/11/18-3/14/18  Referral Diagnosis:    Shoulder pain, left [M25.512]       Bilateral anterior knee pain [M25.561, M25.562]       Osteoarthritis [M19.90]         Referring provider: Abdulkadir Rodriguez, *  Visit Diagnosis:     ICD-10-CM    1. Bilateral chronic knee pain M25.561     M25.562     G89.29    2. Chronic left shoulder pain M25.512     G89.29    3. Shoulder stiffness, left M25.612    4. Decreased strength R53.1        Precautions / Restrictions : HTN, Hyperlipidemia.     Assessment:     Response to Intervention:  Excellent overall progress.  Decreased pain improving function.    Symptoms are consistent with:   left shoulder pain and limited AROM that is residual from previous fracture and bilateral knee pain consistent with OA..    Patient is appropriate to continue with skilled physical therapy intervention, as indicated by initial plan of care.    Goal Status:  Pt. will demonstrate/verbalize independence in self-management of condition in : 6 weeks  Pt. will improve posture : and demonstrate posture with minimal to no cuing;in 6 weeks  Pt. will be able to walk : on uneven/inclined surfaces;for household mobility;for exercise/recreation;with less pain;with less difficulty;in 6 weeks  Patient will ascend / descend: stairs;with railing;with recipricol gait;with less pain;with less difficulty;in 6 weeks  Patient will transfer: sit/stand;floor/stand;with less pain;with less difficulty;in 6 weeks  Patient will reach / maintain arm movement: forward;overhead;behind;for home chores;for dressing;with less pain;with less difficulty;in 6 weeks  No Data Recorded  Other functional progress:    She is able to walk easier.    She is able to move sit to stand easier.    She feels her balance has improved.    She is able to lay on her left shoulder and sleep.    She notice going up the stairs is much  easier.         Plan / Patient Education:     Continue with initial plan of care.  Progress with home program as tolerated.      Subjective:     Pain Rating:  Resting 2  Activity:  2    Response to last treatment: very good  HEP- Frequency: 0-1x/day, Questions or difficulties:  she has had company and was not able to do the exercises as often as she would have liked..    Patient reports:      She was able to do some mopping and vacuuming last Friday without any increased pain.    She is having some ear pain on the same side as she recently had a tooth pulled.  She will be calling the dentist office about it when she gets home.      Objective:           Treatment Today    Manual Therapy  MFR layers 1-3 bilateral: quads, hamstrings, Left: upper trap, scalenes, infraspinatus, lat dorsi,     Manual therapy:  left shoulder inferior mobilization    Rate/grade Target  Direction  Relative movement Location in range Patient position   2,3 Humeral head Inferior  Inferior glide of humeral head on glenoid.  neutral  Supine       Manual therapy:  left shoulder posterior mobilization    Rate/grade Target  Direction  Relative movement Location in range Patient position   2,3 Humeral head Posterior  Posterior glide of humeral head on glenoid.  neutral  Supine         Exercises below represent all exercise the patient has done in the clinic and HEP.  Please see today's exercise flow-sheet for specifics of today's exercise performance.   Therapeutic Exercises:  Exercise #1: Heel/Toe raises-cues for not leaning back on toe raises  Comment #1: 20 bilateral  Exercise #2: gastroc stretch  no hold  slow reps  Comment #2: 15 bilateral  Exercise #3: standing knee flexion  Comment #3: 10 bilateral  cues for posture and more knee flexion  Exercise #4: sittin LAQ  Comment #4: 10 bilateral  for HEP, verbal review today  Exercise #5: Nustep  seat 5 handles 8  Comment #5: resistance: 6, minutes:  5  Exercise #6: UBE-seat 5  Comment #6: forward 2  minutes, backward 2 minutes  Exercise #7: pulleys  Comment #7: patient has home pulleys and is using her pulleys at home  Exercise #8: shoulder extension, flexion, abduction  Comment #8:  orange bilateral x 15 each flexion and extension  noted improvement in flexion,  x 10 bilateral abduction with band , 2#, and 1# each  Exercise #9: sit to stand without UE lowest east table  Comment #9: 10  Exercise #10: bridging   Comment #10: 10  Exercise #11: partial curl ups  Comment #11: 10         TREATMENT MINUTES COMMENTS   Evaluation     Self-care/ Home management     Manual therapy 30 See above.   Neuromuscular Re-education     Therapeutic Activity     Therapeutic Exercises 25 See exercise flow-sheet for details.    Gait training     Modality__________________                Total 55    Blank areas are intentional and mean the treatment did not include these items.       Joseph Raimrez, PT  1/22/2018

## 2021-06-15 NOTE — PROGRESS NOTES
Clinic Care Coordination Contact    Situation: Patient chart reviewed by care coordinator.    Background: SW completed chart review    Assessment: CHW supported patient connecting with COVID-19 vaccine, Patient continues to attend therapy    Plan/Recommendations: Standard Outreach

## 2021-06-15 NOTE — PROGRESS NOTES
[facility] Daily Progress     Patient Name: Laine Sharma  Date: 1/25/2018  Visit #: 6/12  Authorization dates:  1/11/18-3/14/18  Referral Diagnosis:    Shoulder pain, left [M25.512]       Bilateral anterior knee pain [M25.561, M25.562]       Osteoarthritis [M19.90]         Referring provider: Abdulkadir Rodriguez, *  Visit Diagnosis:     ICD-10-CM    1. Bilateral chronic knee pain M25.561     M25.562     G89.29    2. Chronic left shoulder pain M25.512     G89.29    3. Shoulder stiffness, left M25.612        Precautions / Restrictions : HTN, Hyperlipidemia.     Assessment:     Response to Intervention:  Excellent overall progress.  Decreased pain improving function.    Symptoms are consistent with:   left shoulder pain and limited AROM that is residual from previous fracture and bilateral knee pain consistent with OA..    Patient is appropriate to continue with skilled physical therapy intervention, as indicated by initial plan of care.    Goal Status:  Pt. will demonstrate/verbalize independence in self-management of condition in : 6 weeks  Pt. will improve posture : and demonstrate posture with minimal to no cuing;in 6 weeks  Pt. will be able to walk : on uneven/inclined surfaces;for household mobility;for exercise/recreation;with less pain;with less difficulty;in 6 weeks  Patient will ascend / descend: stairs;with railing;with recipricol gait;with less pain;with less difficulty;in 6 weeks  Patient will transfer: sit/stand;floor/stand;with less pain;with less difficulty;in 6 weeks  Patient will reach / maintain arm movement: forward;overhead;behind;for home chores;for dressing;with less pain;with less difficulty;in 6 weeks  No Data Recorded  Other functional progress:    She is able to walk easier.    She is able to move sit to stand easier.    She feels her balance has improved.    She is able to lay on her left shoulder and sleep.    She notice going up the stairs is much easier.    She was able to do  some mopping and vacuuming without any increased pain.         Plan / Patient Education:     Continue with initial plan of care.  Progress with home program as tolerated.      Subjective:     Pain Rating:  Resting 2  Activity:  2    Response to last treatment: very good  HEP- Frequency: 0-1x/day, Questions or difficulties:  she has had company and was not able to do the exercises as often as she would have liked..    Patient reports:      Her knees are feeling much better.    She is c/o some left>right hand pain.    She indicates that she has an infection in her gums.  Since starting oral antibiotics several days ago, her jaw and ear pain has decreased.      Objective:           Treatment Today    Manual Therapy  MFR layers 1-3 bilateral: quads, hamstrings, Left: upper trap, scalenes, infraspinatus, lat dorsi,     Manual therapy:  left shoulder inferior mobilization    Rate/grade Target  Direction  Relative movement Location in range Patient position   2,3 Humeral head Inferior  Inferior glide of humeral head on glenoid.  neutral  Supine       Manual therapy:  left shoulder posterior mobilization    Rate/grade Target  Direction  Relative movement Location in range Patient position   2,3 Humeral head Posterior  Posterior glide of humeral head on glenoid.  neutral  Supine         Exercises below represent all exercise the patient has done in the clinic and HEP.  Please see today's exercise flow-sheet for specifics of today's exercise performance.   Therapeutic Exercises:  Exercise #1: Heel/Toe raises-cues for not leaning back on toe raises  Comment #1: 20 bilateral  Exercise #2: gastroc stretch  no hold  slow reps  Comment #2: 15 bilateral  Exercise #3: standing knee flexion  Comment #3: 10 bilateral  cues for posture and more knee flexion  Exercise #4: sittin LAQ  Comment #4: 10 bilateral  for HEP, verbal review today  Exercise #5: Nustep  seat 5 handles 8  Comment #5: resistance: 6, minutes:  5  Exercise #6:  UBE-seat 5  Comment #6: forward 2 minutes, backward 2 minutes  Exercise #7: pulleys  Comment #7: patient has home pulleys and is using her pulleys at home  Exercise #8: shoulder extension, flexion, abduction  Comment #8: right/left x 15 each flexion, bilateral x 15 extension  right/left  x 10 abduction with band   Exercise #9: sit to stand without UE lowest east table  Comment #9: 10  Exercise #10: bridging   Comment #10: 10  Exercise #11: partial curl ups  Comment #11: 10         TREATMENT MINUTES COMMENTS   Evaluation     Self-care/ Home management     Manual therapy 30 See above.   Neuromuscular Re-education     Therapeutic Activity     Therapeutic Exercises 25 See exercise flow-sheet for details.    Gait training     Modality__________________                Total 55    Blank areas are intentional and mean the treatment did not include these items.       Joseph Ramirez, PT  1/25/2018

## 2021-06-15 NOTE — PROGRESS NOTES
[facility] Daily Progress     Patient Name: Laine Sharma  Date: 2/5/2018  Visit #: 8/12  Authorization dates:  1/11/18-3/14/18  Referral Diagnosis:    Shoulder pain, left [M25.512]       Bilateral anterior knee pain [M25.561, M25.562]       Osteoarthritis [M19.90]         Referring provider: Abdulkadir Rodriguez, *  Visit Diagnosis:     ICD-10-CM    1. Bilateral chronic knee pain M25.561     M25.562     G89.29    2. Chronic left shoulder pain M25.512     G89.29    3. Shoulder stiffness, left M25.612    4. Decreased strength R53.1        Precautions / Restrictions : HTN, Hyperlipidemia.     Assessment:     Response to Intervention:  Excellent overall progress.  Decreased pain improving function.  Strength with exercises is improving.  No pain post manual treatment.    Symptoms are consistent with:   left shoulder pain and limited AROM that is residual from previous fracture and bilateral knee pain consistent with OA..    Patient is appropriate to continue with skilled physical therapy intervention, as indicated by initial plan of care.    Goal Status:  Pt. will demonstrate/verbalize independence in self-management of condition in : 6 weeks  Pt. will improve posture : and demonstrate posture with minimal to no cuing;in 6 weeks  Pt. will be able to walk : on uneven/inclined surfaces;for household mobility;for exercise/recreation;with less pain;with less difficulty;in 6 weeks  Patient will ascend / descend: stairs;with railing;with recipricol gait;with less pain;with less difficulty;in 6 weeks  Patient will transfer: sit/stand;floor/stand;with less pain;with less difficulty;in 6 weeks  Patient will reach / maintain arm movement: forward;overhead;behind;for home chores;for dressing;with less pain;with less difficulty;in 6 weeks  No Data Recorded  Other functional progress:    She is able to walk easier.    She is able to move sit to stand easier.    She feels her balance has improved.    She is able to lay on  her left shoulder and sleep.    She notice going up the stairs is much easier.    She was able to do some mopping and vacuuming without any increased pain.         Plan / Patient Education:     Continue with initial plan of care.  Progress with home program as tolerated.      Subjective:     Pain Rating:  Resting 1  Activity:  2    Response to last treatment: very good  HEP- Frequency: 2-3x/day, Questions or difficulties:  None.    Patient reports:      Left shoulder is a little more sore today,         Objective:           Treatment Today    Manual Therapy  MFR layers 1-3 bilateral: quads, hamstrings, Left: upper trap, scalenes, infraspinatus, lat dorsi,        Exercises below represent all exercise the patient has done in the clinic and HEP.  Please see today's exercise flow-sheet for specifics of today's exercise performance.   Therapeutic Exercises:  Exercise #1: Heel/Toe raises-cues for not leaning back on toe raises  Comment #1: 20 bilateral  Exercise #2: gastroc stretch  no hold  slow reps  Comment #2: 15 bilateral  Exercise #3: standing knee flexion  Comment #3: 10 bilateral  cues for posture and more knee flexion  Exercise #4: sittin LAQ  Comment #4: 10 bilateral  for HEP, verbal review today  Exercise #5: Nustep  seat 5 handles 8  Comment #5: resistance: 5, minutes:  5  Exercise #6: UBE-seat 5  Comment #6: forward 2 minutes, backward 2 minutes  Exercise #7: pulleys  Comment #7: patient has home pulleys and is using her pulleys at home  Exercise #8: shoulder extension, flexion, abduction--green  Comment #8: right/left x 15 each flexion, bilateral x 20 extension  right/left  x 10 abduction with band   Exercise #9: sit to stand without UE lowest east table  Comment #9: 10  Exercise #10: bridging cues for abdominal and gluteal contraction before movement.  Comment #10: 15  Exercise #11: partial curl ups  cues for breathing.  Comment #11: 15  Exercise #12: flexion shoulder stretch with parallel bar  Comment  #12: 15 bilateral         TREATMENT MINUTES COMMENTS   Evaluation     Self-care/ Home management     Manual therapy 30 See above.   Neuromuscular Re-education     Therapeutic Activity     Therapeutic Exercises 25 See exercise flow-sheet for details.    Gait training     Modality__________________                Total 55    Blank areas are intentional and mean the treatment did not include these items.       Joseph Ramirez, PT  2/5/2018

## 2021-06-15 NOTE — PROGRESS NOTES
[facility] Daily Progress     Patient Name: Laine Sharma  Date: 1/29/2018  Visit #: 7/12  Authorization dates:  1/11/18-3/14/18  Referral Diagnosis:    Shoulder pain, left [M25.512]       Bilateral anterior knee pain [M25.561, M25.562]       Osteoarthritis [M19.90]         Referring provider: Abdulkadir Rodriguez, *  Visit Diagnosis:     ICD-10-CM    1. Bilateral chronic knee pain M25.561     M25.562     G89.29    2. Chronic left shoulder pain M25.512     G89.29    3. Shoulder stiffness, left M25.612    4. Decreased strength R53.1        Precautions / Restrictions : HTN, Hyperlipidemia.     Assessment:     Response to Intervention:  Excellent overall progress.  Decreased pain improving function.    Symptoms are consistent with:   left shoulder pain and limited AROM that is residual from previous fracture and bilateral knee pain consistent with OA..    Patient is appropriate to continue with skilled physical therapy intervention, as indicated by initial plan of care.    Goal Status:  Pt. will demonstrate/verbalize independence in self-management of condition in : 6 weeks  Pt. will improve posture : and demonstrate posture with minimal to no cuing;in 6 weeks  Pt. will be able to walk : on uneven/inclined surfaces;for household mobility;for exercise/recreation;with less pain;with less difficulty;in 6 weeks  Patient will ascend / descend: stairs;with railing;with recipricol gait;with less pain;with less difficulty;in 6 weeks  Patient will transfer: sit/stand;floor/stand;with less pain;with less difficulty;in 6 weeks  Patient will reach / maintain arm movement: forward;overhead;behind;for home chores;for dressing;with less pain;with less difficulty;in 6 weeks  No Data Recorded  Other functional progress:    She is able to walk easier.    She is able to move sit to stand easier.    She feels her balance has improved.    She is able to lay on her left shoulder and sleep.    She notice going up the stairs is much  "easier.    She was able to do some mopping and vacuuming without any increased pain.         Plan / Patient Education:     Continue with initial plan of care.  Progress with home program as tolerated.      Subjective:     Pain Rating:  Resting 0  Activity:  0    Response to last treatment: very good  HEP- Frequency: 2-3x/day, Questions or difficulties:  None.    Patient reports:      No pain.    Right arm is feeling better doing the exercises over the weekend, (compared to here at the gym during her last visit).    \"I feel a lot better\"    She is less stiff in the mornings.        Objective:           Treatment Today    Manual Therapy  MFR layers 1-3 bilateral: quads, hamstrings, Left: upper trap, scalenes, infraspinatus, lat dorsi,        Exercises below represent all exercise the patient has done in the clinic and HEP.  Please see today's exercise flow-sheet for specifics of today's exercise performance.   Therapeutic Exercises:  Exercise #1: Heel/Toe raises-cues for not leaning back on toe raises  Comment #1: 20 bilateral  Exercise #2: gastroc stretch  no hold  slow reps  Comment #2: 15 bilateral  Exercise #3: standing knee flexion  Comment #3: 10 bilateral  cues for posture and more knee flexion  Exercise #4: sittin LAQ  Comment #4: 10 bilateral  for HEP, verbal review today  Exercise #5: Nustep  seat 5 handles 8  Comment #5: resistance: 6, minutes:  5  Exercise #6: UBE-seat 5  Comment #6: forward 2 minutes, backward 2 minutes  Exercise #7: pulleys  Comment #7: patient has home pulleys and is using her pulleys at home  Exercise #8: shoulder extension, flexion, abduction  Comment #8: right/left x 15 each flexion, bilateral x 15 extension  right/left  x 10 abduction with band   Exercise #9: sit to stand without UE lowest east table  Comment #9: 10  Exercise #10: bridging   Comment #10: 10  Exercise #11: partial curl ups  Comment #11: 10         TREATMENT MINUTES COMMENTS   Evaluation     Self-care/ Home management "     Manual therapy 35 See above.   Neuromuscular Re-education     Therapeutic Activity     Therapeutic Exercises 23 See exercise flow-sheet for details.    Gait training     Modality__________________                Total 58    Blank areas are intentional and mean the treatment did not include these items.       Joseph Ramirez, PT  1/29/2018

## 2021-06-15 NOTE — PROGRESS NOTES
Mental Health Psychotherapy Note    Patient: Laine Sharma    : 1933 MRN: [id number]    2/10/2021    Start time: 8:06    Stop Time: 8:56 Session # 3    Completed 2 point verification; patient verbally provided full name and date of birth.      Session Type: Patient is presenting for an Individual session.    Laine Sharma is a 87 y.o. female is being seen today for    Chief Complaint   Patient presents with     MH Follow Up     Telephone visit for psychotherapy     Telemedicine Visit: The patient's condition can be safely assessed and treated via synchronous audio and visual telemedicine encounter.      Reason for Telemedicine Visit: Services only offered telehealth    Originating Site (Patient Location): Patient's home    Distant Site (Provider Location): Provider Remote Setting    Consent:  The patient/guardian has verbally consented to: the potential risks and benefits of telemedicine (video visit) versus in person care; bill my insurance or make self-payment for services provided; and responsibility for payment of non-covered services.     Mode of Communication:  Video Conference via [facility] MyChart    As the provider I attest to compliance with applicable laws and regulations related to telemedicine.    Those present for this visit : Patient and therapist    Follow up in regards to ongoing symptom management of  Grief reaction    New symptoms or complaints: Grief and family issues    Functional Impairment:   Personal: 3  Family: 3  Social: 3  Work: 0    Clinical assessment of mental status:   Laine Sharma presented on time.   She was oriented x3, open and cooperative, and dressed appropriately for this session and weather. Her memory was Normal cognitive functioning .  Her speech was  Within normal.  Language was appropriate.  Concentration and focus is Within normal. Psychosis is not noted or reported. She reports her mood is Tearful and sad.  Affect is congruent with speech and is Congruent  w/content of speech.  Fund of knowledge is adequate. Insight is adequate for therapy.    ASSESSMENT: Current Emotional / Mental Status (status of significant symptoms):              Risk status (Self / Other harm or suicidal ideation)              Patient denies safety issues              Patient denies current or recent suicidal ideation or behaviors.              Patient denies current or recent homicidal ideation or behaviors.              Patient denies current or recent self injurious behavior or ideation.              Patient denies other safety concerns.              Patient denies change in risk factors              Patient denies change in protective factors              Recommended that patient call 911 or go to the local ED should there be a change in any of these risk factors.                Attitude:                                   Cooperative               Orientation:                             3X              Speech                          Rate / Production:       Normal/ Responsive Normal                           Volume:                       Normal               Mood:                                      Sad  Tearful              Thought Content:                    Clear               Thought Form:                        Coherent  Logical               Insight:                                     Good     Patient's impression of their current status: Patient shared some sadness not just for her ongoing grief but also what she is experiencing with her family. Her 's  death certificate is missing. She recalls letting her step son take it as he asked so he could read it. Then when asked him, he stated he did not have it and never took it. Patient plans to request a new one.  Her brother's son also has called her to ask a chair that was initially given to her by her bother as a gift some 15 years ago. Brother stated he wanted back. Patient was surprised of the call asking for this chair.  She knows the brother has been on the wheel chair for so long and she does not think he is personally asking the chair. Regardless, patient shared she is not going to allow herself be driven to the negative side of life. She notes she has people who care about her especially her sisters in law, her  and her neighbor. She is not sure how to communicate with her step father.     Therapist impression of patients current state: This 87 y.o. Other female presented with some sadness not only about her own loss but also about family issues. The session focused on active listening and empathy as well as validation of her feelings. Writer encouraged the patient to not share any personal information with any one. Encouraged her to keep in touch with her  should she wish to update her will and other important documents.     Assessment tools used today include: CHAU-7: 0; PHQ-9:0    Type of psychotherapeutic technique provided: Insight oriented, Client centered, Solution-focused and CBT    Progress toward short term goals:Progress as expected, reports keeping in touch with people who are on her side and focusing on positivity.      Review of long term goals: Updated Treatment Plan: 2/04/2021 next review: 5/05/2021    Diagnosis:   1. Grief reaction    2. Adjustment disorder with anxiety       Improving, worsening, no change: Improving    Plan and Follow-up: Patient plans to keep up with her contacts for support. Patient will replaced her  death certificate. Patient continue to increase positive self talk.   Patient's next visit is in a week.     Discharge Criteria/Planning: Patient will continue with follow-up until therapy can be discontinued without return of signs and symptoms.    I have reviewed the note as documented above.  This accurately captures the substance of my conversation with the patient.    As the provider I attest to compliance with applicable laws and regulations related to  telemedicine.  Performed and documented by CHASITY Newell- JOAQUIM; [facility] 2/10/2021

## 2021-06-15 NOTE — PROGRESS NOTES
Assessment/ Plan  Problem List Items Addressed This Visit        Unprioritized    Rotator cuff tendonitis, right - Primary     Today with prominence of long head of the biceps tendinitis.  Patient was referred for physical therapy/OT last time I saw her, 11/25/2020.  However, it appears that she was not contacted for this.  Will refer again today.  Offered long head of the biceps injection which she accepted.  Bicipital groove/tendon was found using thumb.  40 mg of Depo-Medrol 1 cc of lidocaine were injected with a 27-gauge 1-1/2 inch long needle around the bicipital groove,  This was prepped with alcohol prior to this.  Patient tolerated the procedure well.           Relevant Medications    lidocaine 10 mg/mL (1 %) injection 1 mL (Completed)    methylPREDNISolone acetate injection 40 mg (DEPO-MEDROL) (Completed)    Other Relevant Orders    Ambulatory referral to PT/OT        Subjective  CC:  Chief Complaint   Patient presents with     Shoulder Pain     HPI:  87-year-old complains of right shoulder pain.  Indicates its been present for a month but I saw her for this back in November as well.  Pain is worse when she moves her shoulder, better at rest, primarily in the anterior region of the shoulder.  She indicates that she is not recently had physical therapy on the shoulder.  Was first diagnosed in 2012.  Indicates that she is received shoulder injection in the past.  PFSH:  Patient Active Problem List   Diagnosis     Diverticulosis     Esophageal reflux     Osteoarthritis     Hyperlipidemia     Essential hypertension     Healthcare maintenance     Pes anserine bursitis     Nodule of finger of left hand     Grief reaction     Asymptomatic menopausal state      Adjustment disorder with depressed mood     Rotator cuff tendonitis, right     Current medications reviewed as follows:  Current Outpatient Medications on File Prior to Visit   Medication Sig     acetaminophen (TYLENOL) 325 MG tablet Take 650 mg by mouth  "every 6 (six) hours as needed for pain.     amLODIPine (NORVASC) 10 MG tablet Take 1 tablet (10 mg total) by mouth daily.     hydroCHLOROthiazide (HYDRODIURIL) 25 MG tablet Take 1 tablet (25 mg total) by mouth daily.     losartan (COZAAR) 100 MG tablet Take 1 tablet (100 mg total) by mouth daily.     No current facility-administered medications on file prior to visit.      Social History     Tobacco Use   Smoking Status Never Smoker   Smokeless Tobacco Never Used     Social History     Social History Narrative    Patient is  to Dom    Moved to [facility] in 2016     Patient Care Team:  Abdulkadir Rodriguez MD as PCP - General  Abdulkadir Rodriguez MD as Assigned PCP  Ugo Garcia LGSW as Lead Care Coordinator (Primary Care - CC)  Brian Bojorquez CHW as Community Health Worker (Primary Care - CC)  Rosa Stafford LICSW as   Rosa Stafford LICSW as   ROS  As above      Objective  Physical Exam  Vitals:    03/05/21 1517   BP: 138/72   Patient Site: Left Arm   Patient Position: Sitting   Cuff Size: Adult Regular   Pulse: 74   Resp: 18   Temp: 97.7  F (36.5  C)   TempSrc: Temporal   Weight: 149 lb (67.6 kg)   Height: 4' 10\" (1.473 m)     Body mass index is 31.14 kg/m .  Patient with prominent tenderness in the bicipital groove, minimal glenohumeral tenderness.  Reasonably good active range of motion of the arm.  Strength not tested.  Diagnostics:   None      Please note: Voice recognition software was used in this dictation.  It may therefore contain typographical errors.      "

## 2021-06-16 PROBLEM — M70.50 PES ANSERINE BURSITIS: Status: ACTIVE | Noted: 2018-10-09

## 2021-06-16 PROBLEM — R22.32 NODULE OF FINGER OF LEFT HAND: Status: ACTIVE | Noted: 2020-02-11

## 2021-06-16 PROBLEM — M18.12 ARTHRITIS OF CARPOMETACARPAL (CMC) JOINT OF LEFT THUMB: Status: ACTIVE | Noted: 2021-05-26

## 2021-06-16 PROBLEM — F43.21 GRIEF REACTION: Status: ACTIVE | Noted: 2020-09-03

## 2021-06-16 PROBLEM — M75.81 ROTATOR CUFF TENDONITIS, RIGHT: Status: ACTIVE | Noted: 2020-11-25

## 2021-06-16 NOTE — PROGRESS NOTES
"Mental Health Psychotherapy Telephone Note    Patient: Laine Sharma    : 1933 MRN: [id number]    Completed a 2 point identification verification: patient verbalized full name and date of birth.     Date: 3/18/2021   Start time: 9:06   Stop Time: 9:58   Session # 7    The patient has been notified of the following:   \"We have found that certain health care needs can be provided without the need for a face to face visit.  This service lets us provide the care you need with a phone conversation.  I will have full access to your [place] medical record during this entire phone call.   I will be taking notes for your medical record. Since this is like an office visit, we will bill your insurance company for this service.  There are potential benefits and risks of telephone visits (e.g. limits to patient confidentiality) that differ from in-person visits.?  Confidentiality still applies for telephone services, and nobody will record the visit.  It is important to be in a quiet, private space that is free of distractions (including cell phone or other devices) during the visit.?? If during the course of the call I believe a telephone visit is not appropriate, you will not be charged for this service\"  Consent has been obtained for this service by care team member: Yes, per verbal agreement   Session Type: Patient is participating in a telemedicine phone visit: Grief reaction and adjustment disorder.    Those present for this session:  Patient and therapist    Chief Complaint   Patient presents with     MH Follow Up     Telephone visit for Psychotherapy      New symptoms or complaints: Grief,  overwhelmed by the whys    PHQ9:0  GAD7:0    Functional Impairment:   Personal: 3  Family: 3  Work: 0  Social:3        ASSESSMENT: Current Emotional / Mental Status (status of significant symptoms):              Risk status (Self / Other harm or suicidal ideation)              Patient denies safety issues              Patient " "denies current or recent suicidal ideation or behaviors.              Patient denies current or recent homicidal ideation or behaviors.              Patient denies current or recent self injurious behavior or ideation.              Patient denies other safety concerns.              Patient denies change in risk factors              Patient denies change in protective factors              Recommended that patient call 911 or go to the local ED should there be a change in any of these risk factors.                Attitude:                                   Cooperative               Orientation:                             3X              Speech                          Rate / Production:       Normal/ Responsive                          Volume:                       Normal               Mood:                                      Normal              Thought Content:                    Clear               Thought Form:                        Coherent  Logical               Insight:                                     Good     Patient's impression of their current status: Patient reports having been doing well with her grief but one thing has been unsettled still. That is related to the ongoing \" whys\" related to  illness. Patient stated she sometimes feels he knew about his illness and he did not tell. She also acknowledged they both had strong trusting value about each other. For this reason, she notes a total confusion which now she is determined to look at the bright side versus imagining answers related to  illness that led to his passing. Patient got her second vaccine. No reaction so far. Her next visit is in a week.     Therapist impression of patients current state: Writer assessed the patient's safety. Reinforced her effort to remain connected with family and friends. Patient shows awareness and a better understanding around her loss. Only, she is learning how to refrain herself from asking " questions that are not going to be answered related to her  illness. She has been developing some negative feelings around his illness. She was very receptive to today's interventions especially the option to challenge herself finding positive reason to her whys.    Type of psychotherapeutic technique provided: Insight oriented, Client centered, Solution-focused and CBT    Progress toward short term goals: Continues to struggle with some unswered questions    Progress as expected, Pt discussing concerns and coping strategies during tele-sessions. Pt working on homework assignments and skills learned in therapy between sessions    Review of long term goals: Treatment plan updated:  2/04/2021 next review: 5/05/2021    Diagnosis:  1. Grief reaction    2. Adjustment disorder with anxiety      Plan and Follow up: Patient will increase positive self talk. Patient will practice looking at the bright side of her possible answer related to the Whys. Patient next visit is in a week.     Discharge Criteria/Planning: Patient will continue with follow-up until therapy can be discontinued without return of signs and symptoms.     I have reviewed the note as documented above.  This accurately captures the substance of my conversation with the patient.  As the provider I attest to compliance with applicable laws and regulations related to telemedicine.  Performed and documented by CHASITY Newell- JOAQUIM; [facility] 3/18/2021

## 2021-06-16 NOTE — PROGRESS NOTES
".  Mental Health Psychotherapy Telephone Note    Patient: Laine Sharma    : 1933 MRN: [id number]    Completed a 2 point identification verification: patient verbalized full name and date of birth.     Date: 2021    Start time: 9:08  Stop Time: 9:56   Session # 10    The patient has been notified of the following:   \"We have found that certain health care needs can be provided without the need for a face to face visit.  This service lets us provide the care you need with a phone conversation.  I will have full access to your [place] medical record during this entire phone call.   I will be taking notes for your medical record. Since this is like an office visit, we will bill your insurance company for this service.  There are potential benefits and risks of telephone visits (e.g. limits to patient confidentiality) that differ from in-person visits.?  Confidentiality still applies for telephone services, and nobody will record the visit.  It is important to be in a quiet, private space that is free of distractions (including cell phone or other devices) during the visit.?? If during the course of the call I believe a telephone visit is not appropriate, you will not be charged for this service\"  Consent has been obtained for this service by care team member: Yes, per verbal agreement   Session Type: Patient is participating in a telemedicine phone visit : No device for video    Those present for this session: Patient and therapist    Chief Complaint   Patient presents with     MH Follow Up     Telephone visit for psychotherapy      New symptoms or complaints: \"nights and mornings are still hard for me\"  PHQ9:0  GAD7: 0    Functional Impairment:   Personal: 3  Family: 3  Work: 0  Social:3        ASSESSMENT: Current Emotional / Mental Status (status of significant symptoms):              Risk status (Self / Other harm or suicidal ideation)              Patient denies safety issues              Patient denies " current or recent suicidal ideation or behaviors.              Patient denies current or recent homicidal ideation or behaviors.              Patient denies current or recent self injurious behavior or ideation.              Patient denies other safety concerns.              Patient denies change in risk factors              Patient denies change in protective factors              Recommended that patient call 911 or go to the local ED should there be a change in any of these risk factors.                Attitude:                                   Cooperative               Orientation:                             3X              Speech                          Rate / Production:       Normal/ Responsive Normal                           Volume:                       Normal               Mood:                                      Normal              Thought Content:                    Clear               Thought Form:                        Coherent  Logical               Insight:                                     Good     Patient's impression of their current status: Patient reports that nights and mornings continue to be hard on her as they remind her her loss. Was able to visit her 's grave. Wishes to go there at least once a month but depends on family availability. Notes she still has a strong support from some family members. Has been learning how to let go things she can not control. Stays in touch with her [employer]  and friends. Reports no debilitating depression or anxiety. Like to talk about her feelings and shared it is helpful. Her next visit is in 2 weeks.    Therapist impression of patients current state: Writer assessed patient's safety. Patient appears to be doing pretty well with her grief.  Writer and patient developed a new option to cope. This time she will try to do gin and knitting. Writer provided some psycho education around this kind of activity. Patient still welcomed the  idea of having her friends and family write good words about her husbands to use during her lonely times. Was encouraged to start doing so.     Type of psychotherapeutic technique provided: Insight oriented, Client centered, Solution-focused, CBT and Grief processing    Progress toward short term goals: Progress as expected, Pt discussing concerns and coping strategies during tele-sessions. Pt working on homework assignments and skills learned in therapy between sessions    Review of long term goals: Treatment plan updated:2/04/2021 next review: 5/05/2021     Diagnosis:  1. Grief reaction    2. Adjustment disorder with anxiety      Plan and Follow up:   Patient will keep up with her social and family connections.   Patient will start her new project using gin and knitting  Patient will spend some time with her best friend near by this weekend  Patient will continue to take some walks as weather permits  Patient will use her note book to have friends write good words about   Patient's next tel visit is in 2 weeks.     Discharge Criteria/Planning: Patient will continue with follow-up until therapy can be discontinued without return of signs and symptoms.    I have reviewed the note as documented above.  This accurately captures the substance of my conversation with the patient.  As the provider I attest to compliance with applicable laws and regulations related to telemedicine.  Performed and documented by CHASITY Newell- JOAQUIM; [facility] 4/16/2021

## 2021-06-16 NOTE — PROGRESS NOTES
[facility] Discharge Summary  Patient Name: Laine Sharma  Date: 3/28/2018  Referral Diagnosis: Shoulder pain, left  Referring provider: Abdulkadir Rodriguez, *  Visit Diagnosis:   1. Bilateral chronic knee pain     2. Chronic left shoulder pain     3. Shoulder stiffness, left     4. Decreased strength         Goals:  Pt. will demonstrate/verbalize independence in self-management of condition in : 6 weeks;Partially Met;Progressing toward  Pt. will improve posture : and demonstrate posture with minimal to no cuing;in 6 weeks;Met  Pt. will be able to walk : on uneven/inclined surfaces;for household mobility;for exercise/recreation;with less pain;with less difficulty;in 6 weeks;Met  Patient will ascend / descend: stairs;with railing;with recipricol gait;with less pain;with less difficulty;in 6 weeks;Met  Patient will transfer: sit/stand;floor/stand;with less pain;with less difficulty;in 6 weeks;Met  Patient will reach / maintain arm movement: forward;overhead;behind;for home chores;for dressing;with less pain;with less difficulty;in 6 weeks;Met  No Data Recorded    Patient was seen for 9 visits ending on 2/12/18.  A trial of independent self management was initiated.  The patient did not return to continue any physical therapy.  Please see attached note for patient status.    Therapy will be discontinued at this time.     Thank you for your referral.  Joseph Ramirez  3/28/2018  11:02 AM       [facility] Daily Progress     Patient Name: Laine Sharma  Date: 2/12/2018  Visit #: 9/12  Authorization dates:  1/11/18-3/14/18  Referral Diagnosis:    Shoulder pain, left [M25.512]       Bilateral anterior knee pain [M25.561, M25.562]       Osteoarthritis [M19.90]         Referring provider: Abdulkadir Rodriguez, *  Visit Diagnosis:     ICD-10-CM    1. Bilateral chronic knee pain M25.561     M25.562     G89.29    2. Chronic left shoulder pain M25.512     G89.29    3. Shoulder stiffness, left M25.612     4. Decreased strength R53.1        Precautions / Restrictions : HTN, Hyperlipidemia.     Assessment:     Response to Intervention:  Excellent overall progress.  Decreased pain improving function.  Patient reports 90% overall improvement of symptoms and feels comfortable with transfer to [facility].    Symptoms are consistent with:   left shoulder pain and limited AROM that is residual from previous fracture and bilateral knee pain consistent with OA..    Patient is appropriate to continue with skilled physical therapy intervention, as indicated by initial plan of care.    Goal Status:  Pt. will demonstrate/verbalize independence in self-management of condition in : 6 weeks;Partially Met;Progressing toward  Pt. will improve posture : and demonstrate posture with minimal to no cuing;in 6 weeks;Met  Pt. will be able to walk : on uneven/inclined surfaces;for household mobility;for exercise/recreation;with less pain;with less difficulty;in 6 weeks;Met  Patient will ascend / descend: stairs;with railing;with recipricol gait;with less pain;with less difficulty;in 6 weeks;Met  Patient will transfer: sit/stand;floor/stand;with less pain;with less difficulty;in 6 weeks;Met  Patient will reach / maintain arm movement: forward;overhead;behind;for home chores;for dressing;with less pain;with less difficulty;in 6 weeks;Met  No Data Recorded  Other functional progress:    She is able to walk easier.    She is able to move sit to stand easier.    She feels her balance has improved.    She is able to lay on her left shoulder and sleep.    She notice going up the stairs is much easier.    She was able to do some mopping and vacuuming without any increased pain.    Able to turn head while driving easier with less pain.         Plan / Patient Education:     Trial of independent self-management of condition initiated.  Patient to contact PT by phone or schedule an appointment as needed if symptoms increase or progress stops.  If patient has  not returned to continue therapy in 30 days then physical therapy will be discharged.       Subjective:     Pain Rating:  Resting 1  Activity:  2    Response to last treatment: very good  HEP- Frequency: 2-3x/day, Questions or difficulties:  None.    Patient reports:      She is able to set her hair with both hands.    90% overall progress.    Able to walk with very little pain. Right leg feels much better.         Objective:       Shoulder/Elbow ROM  Date: 1/8/18 2/12/18    Shoulder and Elbow ROM ( )   AROM in degrees AROM in degrees AROM in degrees    Right Left Right Left Right Left   Shoulder Flexion (0-180 ) 150 90  115     Shoulder Abduction (0-180 ) 110 80  115     Shoulder Extension (0-60 ) 45 45       Shoulder ER (0-90 ) 25 15  30     Shoulder IR (0-70 )         Shoulder IR/EXT         Elbow Flexion (150 ) WNL WNL       Elbow Extension (0 ) WNL WNL         Shoulder Pain and Disability Index (SPADI) in %: 17.69     Treatment Today    Manual Therapy  MFR layers 1-3 bilateral: quads, hamstrings, Left: upper trap, scalenes, infraspinatus, lat dorsi,        Exercises below represent all exercise the patient has done in the clinic and HEP.  Please see today's exercise flow-sheet for specifics of today's exercise performance.   Therapeutic Exercises:  Exercise #1: Heel/Toe raises  Comment #1: 20 bilateral  Exercise #2: gastroc stretch  no hold  slow reps cues for keeping knee from going past toes  Comment #2: 15 bilateral  Exercise #3: standing knee flexion  Comment #3: 10 bilateral  cues for posture and more knee flexion  Exercise #4: sittin LAQ  Comment #4: 10 bilateral  for HEP, verbal review today  Exercise #5: Nustep  seat 5 handles 8  Comment #5: resistance: 5, minutes:  5  Exercise #6: UBE-seat 5  Comment #6: forward 2 minutes, backward 2 minutes  Exercise #7: pulleys  Comment #7: patient has home pulleys and is using her pulleys at home  Exercise #8: shoulder extension, flexion, abduction--green  Comment  #8: bilateral x 15 each flexion, bilateral x 16 extension  right/left  x 15 abduction with band   Exercise #9: sit to stand without UE lowest east table  Comment #9: 15  Exercise #10: bridging   Comment #10: 15  Exercise #11: partial curl ups    Comment #11: 15  Exercise #12: flexion shoulder stretch with parallel bar  Comment #12: 15 bilateral         TREATMENT MINUTES COMMENTS   Evaluation     Self-care/ Home management     Manual therapy 25 See above.   Neuromuscular Re-education     Therapeutic Activity     Therapeutic Exercises 35 See exercise flow-sheet for details.    Gait training     Modality__________________                Total 60    Blank areas are intentional and mean the treatment did not include these items.       Joseph Ramirez, PT  2/12/2018

## 2021-06-16 NOTE — PROGRESS NOTES
Clinic Care Coordination Contact    Situation: Patient chart reviewed by care coordinator.    Background: SW completed chart review    Assessment: Patient continues to attend therapy. CHW supporting with a group support resource    Plan/Recommendations: Standard Outreach

## 2021-06-16 NOTE — PROGRESS NOTES
3/24/2021  Clinic Care Coordination Contact    Community Health Worker Follow Up    Goals:   Goals Addressed                 This Visit's Progress       Patient Stated      Mental Health Management (pt-stated)   90%     Goal Statement: I want to be aware of support groups in my area (grief,  recently in memory care) within 1-2 months  Date Goal set: 09/08/20  Barriers: Unsure of resources  Strengths: Children support  Date to Achieve By: 11/08/2020  Patient expressed understanding of goal: yes  Action steps to achieve this goal:  1. I continue to  talk with therapist for support every 2 weeks and will ask the therapist if she knows of any virtual senior support group.  2. I will continue to connect with family members on a regular bases for support.  3. I will wait for CHW to call and provide information on support group.  4. I will update CCC team at outreach.    Updated: 3-24-21 AL        COMPLETED: Other (pt-stated)   100%     .Goal Statement: I completed my COVID-19 vaccine.  Date Goal set: 1/25/2021  Barriers: limited info regarding COVID vaccine  Strengths: motivated to get the vaccine  Date to Achieve By: 3-21-21  Patient expressed understanding of goal: yes  Personal Plan:  If I have any questions about COVID symptoms I will call to talk to the doctor.  [facility] [address] [place] [address]  [phone]                    Called and spoke to patient and follow up on goasl.  Patient reported:  -got the 2nd COVID vaccine and did not have any reactions.  -overall doing well.  Continue to talk to therapist on the phone.    CHW will provide resources for support group at next outreach waiting to hear back from the place.        CHW Follow up: Monthly    CHW Plan: Follow up on goals    CHW Next Follow Up: 4-23-21

## 2021-06-16 NOTE — PROGRESS NOTES
"Mental Health Psychotherapy Telephone Note    Patient: Laine Sharma    : 1933 MRN: [id number]    Completed a 2 point identification verification: patient verbalized full name and date of birth.     Date: 3/25/2021    Start time: 10:03  Stop Time: 10:49   Session # 8  The patient has been notified of the following:   \"We have found that certain health care needs can be provided without the need for a face to face visit.  This service lets us provide the care you need with a phone conversation.  I will have full access to your [place] medical record during this entire phone call.   I will be taking notes for your medical record. Since this is like an office visit, we will bill your insurance company for this service.  There are potential benefits and risks of telephone visits (e.g. limits to patient confidentiality) that differ from in-person visits.?  Confidentiality still applies for telephone services, and nobody will record the visit.  It is important to be in a quiet, private space that is free of distractions (including cell phone or other devices) during the visit.?? If during the course of the call I believe a telephone visit is not appropriate, you will not be charged for this service\"  Consent has been obtained for this service by care team member: Yes, per verbal agreement   Session Type: Patient is participating in a telemedicine phone visit: No device for Video    Those present for this session: Patient and therapist    Chief Complaint   Patient presents with      Follow Up     Telephone visit for psychotherapy      New symptoms or complaints:  Grief, Family issues  PHQ9:0  GAD7:0    Functional Impairment:   Personal: 3  Family: 3  Work: 3  Social:3    ASSESSMENT: Current Emotional / Mental Status (status of significant symptoms):              Risk status (Self / Other harm or suicidal ideation)              Patient denies safety issues              Patient denies current or recent suicidal " ideation or behaviors.              Patient denies current or recent homicidal ideation or behaviors.              Patient denies current or recent self injurious behavior or ideation.              Patient denies other safety concerns.              Patient denies change in risk factors              Patient denies change in protective factors              Recommended that patient call 911 or go to the local ED should there be a change in any of these risk factors.                Attitude:                                   Cooperative               Orientation:                             3X              Speech                          Rate / Production:       Normal/ Responsive Normal                           Volume:                       Normal               Mood:                                      Normal              Thought Content:                    Clear               Thought Form:                        Coherent  Logical               Insight:                                     Good     Patient's impression of their current status: Patient reports she has been doing pretty good for most part with her grief but notes the times she is alone are still hard on her. She notes evenings and mornings have been bringing too much thoughts about her loss. She finds some break with she is busy during the day either doing her daily routines like household activities, shopping, walking or spending some time over the phone with beloved ones or having meals together in restaurants. Reports being fortunate to have a very strong family support and a friend who lives near by. She also shares some issues with family in [place]. Has noted some signs that she can not count on them in this process. Patient still needs some support group that is meeting in person. She is agreeable to join one as long as it is near her neighborhood.     Therapist impression of patients current state: Writer processed patient's feelings around her loss.  She continues thinking a lot about her loss . Though she also is developing some sense of acceptance of the reality of living without him. She notes the more she thinks about positive things she has shared with her , the more she finds the courage to move on. Patient was receptive about asking her family and friends some words( take home) about  when the meet for meals, that help her to feel good.  This will help her to keep positive thoughts. A support group would help in this grief process. Only she has no ability to have video. Writer will continue to look for resources that will help to connect with others in person.     Type of psychotherapeutic technique provided: Insight oriented, Client centered, Solution-focused and CBT    Progress toward short term goals: Progress as expected toward acceptance, Pt discussing concerns and coping strategies during tele-sessions. Pt working on homework assignments and skills learned in therapy between sessions    Review of long term goals: Treatment plan updated: 2/04/2021 next review: 5/05/2021     Diagnosis:  1. Grief reaction    2. Adjustment disorder with anxiety      Plan and Follow up: Patient will keep up with her positive connections with family and friends. Patient will refrain from having any negative discussion with niece or any family members   Patient will continue to challenge herself keeping up with her daily routines and self care.   Patient will have her next visit in a week.     Discharge Criteria/Planning: Patient will continue with follow-up until therapy can be discontinued without return of signs and symptoms.    I have reviewed the note as documented above.  This accurately captures the substance of my conversation with the patient.  As the provider I attest to compliance with applicable laws and regulations related to telemedicine.  Performed and documented by CHASITY Newell- JOAQUIM; [facility] 3/25/2021

## 2021-06-16 NOTE — PROGRESS NOTES
"Mental Health Psychotherapy Telephone Note    Patient: Laine Sharma    : 1933 MRN: [id number]    Completed a 2 point identification verification: patient verbalized full name and date of birth.     Date: 2021    Start time: 9:08  Stop Time: 9:56   Session # 9    The patient has been notified of the following:   \"We have found that certain health care needs can be provided without the need for a face to face visit.  This service lets us provide the care you need with a phone conversation.  I will have full access to your [facility] medical record during this entire phone call.   I will be taking notes for your medical record. Since this is like an office visit, we will bill your insurance company for this service.  There are potential benefits and risks of telephone visits (e.g. limits to patient confidentiality) that differ from in-person visits.?  Confidentiality still applies for telephone services, and nobody will record the visit.  It is important to be in a quiet, private space that is free of distractions (including cell phone or other devices) during the visit.?? If during the course of the call I believe a telephone visit is not appropriate, you will not be charged for this service\"  Consent has been obtained for this service by care team member: Yes, per verbal agreement   Session Type: Patient is participating in a telemedicine phone visit : No device for video    Those present for this session: Patient and therapist    Chief Complaint   Patient presents with     MH Follow Up     Telephone visit for psychotherapy      New symptoms or complaints: None reported  PHQ9:0  GAD7: 0    Functional Impairment:   Personal: 3  Family: 3  Work: 0  Social:3        ASSESSMENT: Current Emotional / Mental Status (status of significant symptoms):              Risk status (Self / Other harm or suicidal ideation)              Patient denies safety issues              Patient denies current or recent suicidal ideation " or behaviors.              Patient denies current or recent homicidal ideation or behaviors.              Patient denies current or recent self injurious behavior or ideation.              Patient denies other safety concerns.              Patient denies change in risk factors              Patient denies change in protective factors              Recommended that patient call 911 or go to the local ED should there be a change in any of these risk factors.                Attitude:                                   Cooperative               Orientation:                             3X              Speech                          Rate / Production:       Normal/ Responsive Normal                           Volume:                       Normal               Mood:                                      Normal              Thought Content:                    Clear               Thought Form:                        Coherent  Logical               Insight:                                     Good     Patient's impression of their current status: Patient is doing fairly well. Continues to experience some loneliness during nights but has been developing understanding and challenging herself to overcome unwanted thoughts and feelings. Has a strong support from family and friends. Has been taking step backs from some negativity in the family. Patient agreed to continue with her daily routines as they seem to be working well. She is doing well enough to feel it is okay to move her session to biweekly. No in person group available yet.     Therapist impression of patients current state: Writer assessed patient's safety. Patient appears to be doing pretty well with her grief. Writer supported her wish to be alone on [facility] as long as she is in contact with her family.  Writer and patient developed a new option to cope. Patient welcomed the idea of having her friends and family write good words about her husbands to use during her  lonely times.     Type of psychotherapeutic technique provided: Insight oriented, Client centered, Solution-focused, CBT and Grief processing    Progress toward short term goals: Progress as expected, Pt discussing concerns and coping strategies during tele-sessions. Pt working on homework assignments and skills learned in therapy between sessions    Review of long term goals: Treatment plan updated:2/04/2021 next review: 5/05/2021     Diagnosis:  1. Grief reaction    2. Adjustment disorder with anxiety      Plan and Follow up:   Patient will keep up with her social and family connections.   Patient plans to attend Religious on Easter  Patient will spend some time with her best friend near by this weekend  Patient will continue to take some walks as weather permits  Patient will use her note book to have friends write good words about   Patient's next tel visit is in 2 weeks.     Discharge Criteria/Planning: Patient will continue with follow-up until therapy can be discontinued without return of signs and symptoms.    I have reviewed the note as documented above.  This accurately captures the substance of my conversation with the patient.  As the provider I attest to compliance with applicable laws and regulations related to telemedicine.  Performed and documented by CHASITY Newell- JOAQUIM; [facility] 4/1/2021

## 2021-06-17 NOTE — TELEPHONE ENCOUNTER
Refill Approved    Rx renewed per Medication Renewal Policy. Medication was last renewed on 2/28/20.    Jayjay Gonzalez, [facility] Triage/Med Refill 5/6/2021     Requested Prescriptions   Pending Prescriptions Disp Refills     amLODIPine (NORVASC) 10 MG tablet [Pharmacy Med Name: amLODIPine Besylate 10 MG Oral Tablet] 90 tablet 0     Sig: Take 1 tablet by mouth once daily       Calcium-Channel Blockers Protocol Passed - 5/5/2021 10:01 AM        Passed - PCP or prescribing provider visit in past 12 months or next 3 months     Last office visit with prescriber/PCP: 3/5/2021 Abdulkadir Rodriguez MD OR same dept: 3/5/2021 Abdulkadir Rodriguez MD OR same specialty: 3/5/2021 Abdulkadir Rodriguez MD  Last physical: 9/3/2020 Last MTM visit: Visit date not found   Next visit within 3 mo: Visit date not found  Next physical within 3 mo: Visit date not found  Prescriber OR PCP: Abdulkadir Rodriguez MD  Last diagnosis associated with med order: 1. Essential hypertension  - amLODIPine (NORVASC) 10 MG tablet [Pharmacy Med Name: amLODIPine Besylate 10 MG Oral Tablet]; Take 1 tablet by mouth once daily  Dispense: 90 tablet; Refill: 0    If protocol passes may refill for 12 months if within 3 months of last provider visit (or a total of 15 months).             Passed - Blood pressure filed in past 12 months     BP Readings from Last 1 Encounters:   03/05/21 138/72

## 2021-06-17 NOTE — PROGRESS NOTES
Outpatient Mental Health Treatment Plan    Name:  Laine Sharma  :  1933  MRN:  [id number]       I've re-evaluated this with the patient and changes were noted from 2021    Treatment Plan:  Updated Treatment Plan 2  Intake/initial treatment plan date:  10/28/2020  Benefit and risks and alternatives have been discussed: Yes  Is this treatment appropriate with minimal intrusion/restrictions: Yes  Estimated duration of treatment:  10+  Anticipated frequency of services:  Every week.  Necessity for frequency: This frequency is needed to establish therapeutic goals and for continuity of care in order to monitor progress.  Necessity for treatment: To address cognitive, behavioral, and/or emotional barriers in order to work toward goals and to improve quality of life.    Session Type: Patient is presenting for an Individual session.    Plan:   Grief and Loss    Goal: Accept the loss of beloved ones and return to stable level of functioning as evidenced by acceptance     Strategies:    ? [x]  Express unresolved emotions regarding losses  ? [x]  Display an understanding of the grief process and how this process may be exacerbated by or may exacerbate mental illness symptoms   ?[x]  Develop an understanding of how avoidance of the grief process may affect functioning in all areas   ?[x]  Develop alternative diversions and other coping mechanisms that are related to loss issues   ?[x]  Resolve spiritual conflict   ?[x]  Redevelop a supportive social system and improve interpersonal relationships      Degree to which this is a problem (1-4): 2    Degree to which goal is met (1-4):2    Date of  Next Review: 2021       ?   ? Anxiety    Goal:  Decrease average anxiety level from 2 to 1    Strategies: ? [x]Learn and practice relaxation techniques and other coping strategies (e.g., thought stopping, reframing,    meditation)     ? [x] Increase involvement in meaningful activities     ? [x] Discuss sleep hygiene     ?  [x] Explore thoughts and expectations about self and others     ? [x] Identify and monitor triggers for panic/anxiety symptoms     ? [x] Implement physical activity routine (with physician approval)     ? [] Consider introduction of bibliotherapy and/or videos     ? [x] Continue compliance with medical treatment plan (or explore  barriers)                                         Degree to which this is a problem: 2  Degree to which goal is met: 2  Date of  Next Review:8/11/2021       Functional Impairment:  1=Not at all/Rarely  2=Some days  3=Most Days  4=Every Day    Personal : 3  Family : 3  Social : 3   Work/school : 0    Diagnosis:   1. Grief reaction   2.Adjustment disorder with anxiety    WHODAS 2.0 12-item version:2 or 4 %    Scores presented in qualifiers to represent level of disability.   NO Problem (none, absent, negligible,...) 0-4%    H1= 0  H2= 0  H3= 0    Clinical assessments and measures completed:. PHQ9: 0; GAD7: 0; CAGE AID: 0/4; WHODAS 2:0: 4 %; C-SSRS: 0     Strengths:Patient identified the following strengths or resources that will help them succeed in treatment: [employer] / [employer], friends / good social support, family support, insight, intelligence and strong social skills.     Limitations:Things that may interfere with the patient's success in treatment include: ongoing  Grief. Some family issues that affect her.     Cultural Considerations:   American. Family oriented. Strong in her [employer]. Uses [employer]. Might needs extra support to handle household responsibilities.     Persons responsible for this plan: Patient and Provider    Psychotherapist Signature           Patient Signature:              Guardian Signature             Provider: Performed and documented by JUDE Newell; [facility]   Date:  5/12/2021

## 2021-06-17 NOTE — PROGRESS NOTES
".  Mental Health Psychotherapy Telephone Note    Patient: Laine Sharma    : 1933 MRN: [id number]    Completed a 2 point identification verification: patient verbalized full name and date of birth.     Date: 2021    Start time: 9:04  Stop Time: 9:54  Session # 11    The patient has been notified of the following:   \"We have found that certain health care needs can be provided without the need for a face to face visit.  This service lets us provide the care you need with a phone conversation.  I will have full access to your [facility] medical record during this entire phone call.   I will be taking notes for your medical record. Since this is like an office visit, we will bill your insurance company for this service.  There are potential benefits and risks of telephone visits (e.g. limits to patient confidentiality) that differ from in-person visits.?  Confidentiality still applies for telephone services, and nobody will record the visit.  It is important to be in a quiet, private space that is free of distractions (including cell phone or other devices) during the visit.?? If during the course of the call I believe a telephone visit is not appropriate, you will not be charged for this service\"  Consent has been obtained for this service by care team member: Yes, per verbal agreement   Session Type: Patient is participating in a telemedicine phone visit : No device for video    Those present for this session: Patient and therapist    Chief Complaint   Patient presents with     MH Follow Up     Telephone visit for psychotherapy     New symptoms or complaints: Family issues  PHQ9:0  GAD7: 0    Functional Impairment:   Personal: 3  Family: 3  Work: 0  Social:3        ASSESSMENT: Current Emotional / Mental Status (status of significant symptoms):              Risk status (Self / Other harm or suicidal ideation)              Patient denies safety issues              Patient denies current or recent suicidal " ideation or behaviors.              Patient denies current or recent homicidal ideation or behaviors.              Patient denies current or recent self injurious behavior or ideation.              Patient denies other safety concerns.              Patient denies change in risk factors              Patient denies change in protective factors              Recommended that patient call 911 or go to the local ED should there be a change in any of these risk factors.                Attitude:                                   Cooperative               Orientation:                             3X              Speech                          Rate / Production:       Normal/ Responsive Normal                           Volume:                       Normal               Mood:                                      Normal              Thought Content:                    Clear               Thought Form:                        Coherent  Logical               Insight:                                     Good     Patient's impression of their current status: Patient continues to report that nights and mornings are  hard on her as they remind her her loss. She also stated some stressful situation in the family involving her brother and her niece. Patient wants to put the issue of the [employer] behind. He nephew will pick it up to bring it back to her brother next month. Patient notes there is more to keep her busy in a better way than worrying about other people's behaviors. Likes to talk about her feelings and shared it is helpful. Her next visit is in 2 weeks.    Therapist impression of patients current state: Writer assessed patient's safety. Patient appears to be doing pretty well with her grief.  Writer encouraged the patient to keep up with  new option to cope which included gin and knitting. Writer provided some psycho education around this kind of activity. Patient still welcomed the idea of having her friends and  family write good words about her husbands to use during her lonely times. She also was aggreable to let go family stressful situation that she can not change.     Type of psychotherapeutic technique provided: Insight oriented, Client centered, Solution-focused, CBT and Grief processing    Progress toward short term goals: Progress as expected, Pt discussing concerns and coping strategies during tele-sessions. Pt working on homework assignments and skills learned in therapy between sessions    Review of long term goals: Treatment plan updated:2/04/2021 next review: 5/05/2021     Diagnosis:  1. Grief reaction    2. Adjustment disorder with anxiety      Plan and Follow up: Patient will keep these goals:  Patient will keep up with her social and family connections.   Patient will start her new project using gin and knitting  Patient will spend some time with her best friend near by this weekend  Patient will continue to take some walks as weather permits  Patient will use her note book to have friends write good words about   Patient's next tel visit is in 2 weeks.     Discharge Criteria/Planning: Patient will continue with follow-up until therapy can be discontinued without return of signs and symptoms.    I have reviewed the note as documented above.  This accurately captures the substance of my conversation with the patient.  As the provider I attest to compliance with applicable laws and regulations related to telemedicine.  Performed and documented by CHASITY Newell- JOAQUIM; [facility] 4/28/2021

## 2021-06-17 NOTE — PROGRESS NOTES
Assessment/ Plan  1. Left wrist pain  Pain seems to be over the volar surface of the wrist over the triquetrum  X-ray is negative except for some thumb osteoarthritis  Exam not very concerning, negative grind test, good range of motion, minimal tenderness    We will have her wear a volar-based wrist splint for 1 to 2 weeks, reassess if pain persists  - XR Wrist Left 3 or More VWS      Subjective    Chief Complaint   Patient presents with     Other     thumb pain       HPI  Patient came planes of left wrist pain, sudden onset about 1 week ago.  Woke up with pain, no history of injury.  No swelling.  Never had similar pain before.  Would she tries to grab on and shake things.  Does not hurt all the time.  No fever or chills no rash or redness  Current Outpatient Medications on File Prior to Visit   Medication Sig     acetaminophen (TYLENOL) 325 MG tablet Take 650 mg by mouth every 6 (six) hours as needed for pain.     amLODIPine (NORVASC) 10 MG tablet Take 1 tablet by mouth once daily     hydroCHLOROthiazide (HYDRODIURIL) 25 MG tablet Take 1 tablet (25 mg total) by mouth daily.     losartan (COZAAR) 100 MG tablet Take 1 tablet (100 mg total) by mouth daily.     [DISCONTINUED] amLODIPine (NORVASC) 10 MG tablet Take 1 tablet (10 mg total) by mouth daily.     No current facility-administered medications on file prior to visit.      Patient Active Problem List   Diagnosis     Diverticulosis     Esophageal reflux     Osteoarthritis     Hyperlipidemia     Essential hypertension     Healthcare maintenance     Pes anserine bursitis     Nodule of finger of left hand     Grief reaction     Asymptomatic menopausal state      Adjustment disorder with depressed mood     Rotator cuff tendonitis, right     Past Surgical History:   Procedure Laterality Date     APPENDECTOMY       TX REMOVAL GALLBLADDER      Description: Cholecystectomy;  Recorded: 01/06/2009;     TX TOTAL ABDOM HYSTERECTOMY      Description: Hysterectomy;  Recorded:  01/06/2009;     No family history on file.    ROS  As listed above    Objective  Physical Exam  Vitals:    05/06/21 1532   BP: 134/77   Patient Site: Right Arm   Patient Position: Sitting   Cuff Size: Adult Regular   Pulse: 99   Resp: 16   Weight: 150 lb (68 kg)     Left wrist and hand are examined  No redness, bruising, swelling are evident on inspection  Tenderness on palpation ?  : yes,  in following  Location(s): Over the triquetrum is noted  No tenderness with axial pressure   rom intact  Sensation grossly inact  Personally reviewed wrist x-ray which is negative      Please note: Voice recognition software was used in this dictation.  It may therefore contain typographical errors.

## 2021-06-17 NOTE — PROGRESS NOTES
".  Mental Health Psychotherapy Telephone Note    Patient: Laine Sharma    : 1933 MRN: [id number]    Completed a 2 point identification verification: patient verbalized full name and date of birth.     Date: 2021     Start time: 9:12  Stop Time: 9:51  Session # 12    The patient has been notified of the following:   \"We have found that certain health care needs can be provided without the need for a face to face visit.  This service lets us provide the care you need with a phone conversation.  I will have full access to your [place] medical record during this entire phone call.   I will be taking notes for your medical record. Since this is like an office visit, we will bill your insurance company for this service.  There are potential benefits and risks of telephone visits (e.g. limits to patient confidentiality) that differ from in-person visits.?  Confidentiality still applies for telephone services, and nobody will record the visit.  It is important to be in a quiet, private space that is free of distractions (including cell phone or other devices) during the visit.?? If during the course of the call I believe a telephone visit is not appropriate, you will not be charged for this service\"  Consent has been obtained for this service by care team member: Yes, per verbal agreement   Session Type: Patient is participating in a telemedicine phone visit : No device for video    Those present for this session: Patient and therapist    Chief Complaint   Patient presents with     MH Follow Up     Telephone visit for psychotherapy      MH Treatment Plan     New symptoms or complaints:Grief, family issues; learning how to let go some feelings  PHQ-9: 0 ;GAD7: 0; CAGE AID: 0/4; WHODAS 2:0: 4 %; C-SSRS: 0    Functional Impairment:   Personal: 3  Family: 3  Work: 0  Social:3        ASSESSMENT: Current Emotional / Mental Status (status of significant symptoms):              Risk status (Self / Other harm or suicidal " "ideation)              Patient denies safety issues              Patient denies current or recent suicidal ideation or behaviors.              Patient denies current or recent homicidal ideation or behaviors.              Patient denies current or recent self injurious behavior or ideation.              Patient denies other safety concerns.              Patient denies change in risk factors              Patient denies change in protective factors              Recommended that patient call 911 or go to the local ED should there be a change in any of these risk factors.                Attitude:                                   Cooperative               Orientation:                             3X              Speech                          Rate / Production:       Normal/ Responsive Normal                           Volume:                       Normal               Mood:                                      Normal              Thought Content:                    Clear               Thought Form:                        Coherent  Logical               Insight:                                     Good     Patient's impression of their current status: Patient continues to report tough times in the evenings and early morning. She notes it is something she needs to pass one day and feel better again. Still in touch with her friends,  and family. Has been reading much her favorite book. Likes mystery books. Still noticing some behaviors in family that make her uncomfortable. Shares she is learning how to let go any negative feelings about his.  Agreed to look for a book\" The book of forgiving\" by Jose Conte and his daughter  Mpho. She finally found out that the grief support is reopening to onsite meeting. She will start next week. Has been visiting her  grave at least once a month. Has been also thinking about reconnection with the memory care team to visit them as her  took his last breath there. " "Feels it will bring her some peace. Hurt her finger and was given a brace; notes it helps but still is not sure what led to the pain. Likes to talk about her feelings and shared it is helpful. Her next visit is in 2 weeks.    Therapist impression of patients current state: Writer assessed patient's safety. Patient appears to be doing pretty well with her grief.  Writer encouraged the patient to keep up with communicating her needs to her friends and family. Patient is initiating new activities. She has resumed her reading hobbies. This was reinforced. She has not started her knitting and was encouraged to not let her idea sleep. Processed some feelings around family and what she notes is \" all about material stuff\". Though she is willing to let go some feelings that prevent her from progressing with her grief process. Patient still welcomed the idea of having her friends and family write good words about her husbands to use during her lonely times. However,she has not started this yet. Over all, patient seems doing consistently pretty well as noted on her updated Treatment plan today.     Type of psychotherapeutic technique provided: Insight oriented, Client centered, Solution-focused, CBT and Grief processing    Progress toward short term goals: Progress as expected, Pt discussing concerns and coping strategies during tele-sessions. Pt working on homework assignments and skills learned in therapy between sessions    Review of long term goals: Treatment plan updated:5/12/2021, next review: 8/11/2021    Diagnosis:  1. Grief reaction    2. Adjustment disorder with anxiety      Plan and Follow up: Patient will keep these goals:  Patient will keep up with her social and family connections.   Patient will start her new project using gin and knitting  Patient will spend some time with her best friend near by this weekend  Patient will continue to take some walks as weather permits  Patient will use her note book to " "have friends write good words about   Patient plan to buy  \"TheBook of  Forgiving\" by Jose Conte and Dmitry  Patient's next tel visit is in 2 weeks.     Discharge Criteria/Planning: Patient will continue with follow-up until therapy can be discontinued without return of signs and symptoms.    I have reviewed the note as documented above.  This accurately captures the substance of my conversation with the patient.  As the provider I attest to compliance with applicable laws and regulations related to telemedicine.  Performed and documented by CHASITY Newell- JAOQUIM; [facility] 5/12/2021  "

## 2021-06-17 NOTE — PROGRESS NOTES
5/4/2021  Clinic Care Coordination Contact    Community Health Worker Follow Up    Goals:   Goals Addressed                 This Visit's Progress       Patient Stated      Mental Health Management (pt-stated)   90%     Goal Statement: I want to be aware of support groups in my area (grief,  recently in memory care) within 1-2 months  Date Goal set: 09/08/20  Barriers: Unsure of resources  Strengths: Children support  Date to Achieve By: 11/08/2020  Patient expressed understanding of goal: yes  Action steps to achieve this goal:  1. I continue to  talk with therapist for support every 2 weeks and will ask the therapist if she knows of any virtual senior support group.  2. I will continue to connect with family members on a regular bases for support.  3. I will wait for Antione Ragsdale [phone] to call call back.    4. I will update [facility] team at outreach.    Resources Grief Support Group  https://www.griefshare.org  [facility] group  Mondays at 6:30 pm   Antione Ragsdale  [phone]  [place] for this group   Next meeting   Monday, May 03, 2021   6:30 pm - 8:30 pm View meeting schedule   Group location  [address] [address]           Updated: 5-4-21 AL          Called and spoke to patient and follow up on goal.  Patient reported:  -doing good.  -no plans for mother's day. Just stay home.    Conference call with patient and left VM for  Antione Ragsdale [phone] at [facility] to call back regarding information grief support group.  Resources Grief Support Group  https://www.[email or web address]  [facility] group  Mondays at 6:30 pm   Antione Ragsdale  [phone]  [place] for this group   Next meeting   Monday, May 03, 2021   6:30 pm - 8:30 pm View meeting schedule   Group location  [address],  [place] [address]       CHW Follow up: Monthly    CHW Plan: Follow up on goals    CHW Next Follow Up: 6-4-21

## 2021-06-18 NOTE — PATIENT INSTRUCTIONS - HE
Patient Instructions by Abdulkadir Rodriguez MD at 9/3/2020 10:00 AM     Author: Abdulkadir Rodriguez MD Service: -- Author Type: Physician    Filed: 9/3/2020 12:33 PM Encounter Date: 9/3/2020 Status: Signed    : Abdulkadir Rodriguez MD (Physician)         Patient Education     Exercise for a Healthier Heart  You may wonder how you can improve the health of your heart. If youre thinking about exercise, youre on the right track. You dont need to become an athlete, but you do need a certain amount of brisk exercise to help strengthen your heart. If you have been diagnosed with a heart condition, your doctor may recommend exercise to help stabilize your condition. To help make exercise a habit, choose safe, fun activities.       Be sure to check with your health care provider before starting an exercise program.    Why exercise?  Exercising regularly offers many healthy rewards. It can help you do all of the following:    Improve your blood cholesterol levels to help prevent further heart trouble    Lower your blood pressure to help prevent a stroke or heart attack    Control diabetes, or reduce your risk of getting this disease    Improve your heart and lung function    Reach and maintain a healthy weight    Make your muscles stronger and more limber so you can stay active    Prevent falls and fractures by slowing the loss of bone mass (osteoporosis)    Manage stress better  Exercise tips  Ease into your routine. Set small goals. Then build on them.  Exercise on most days. Aim for a total of 150 or more minutes of moderate to  vigorous intensity activity each week. Consider 40 minutes, 3 to 4 times a week. For best results, activity should last for 40 minutes on average. It is OK to work up to the 40 minute period over time. Examples of moderate-intensity activity is walking one mile in 15 minutes or 30 to 45 minutes of yard work.  Step up your daily activity level. Along with your exercise program, try  being more active throughout the day. Walk instead of drive. Do more household tasks or yard work.  Choose one or more activities you enjoy. Walking is one of the easiest things you can do. You can also try swimming, riding a bike, or taking an exercise class.  Stop exercising and call your doctor if you:    Have chest pain or feel dizzy or lightheaded    Feel burning, tightness, pressure, or heaviness in your chest, neck, shoulders, back, or arms    Have unusual shortness of breath    Have increased joint or muscle pain    Have palpitations or an irregular heartbeat      8997-4809 [employer]. [address] [place] [address]. All rights reserved. This information is not intended as a substitute for professional medical care. Always follow your healthcare professional's instructions.         Patient Education   Understanding [employer] MyPlate  The USDA (US Department of Agriculture) has guidelines to help you make healthy food choices. These are called MyPlate. MyPlate shows the food groups that make up healthy meals using the image of a place setting. Before you eat, think about the healthiest choices for what to put onto your plate or into your cup or bowl. To learn more about building a healthy plate, visit www.choosemyplate.gov.       The Food Groups    Fruits: Any fruit or 100% fruit juice counts as part of the Fruit Group. Fruits may be fresh, canned, frozen, or dried, and may be whole, cut-up, or pureed. Make half your plate fruits and vegetables.    Vegetables: Any vegetable or 100% vegetable juice counts as a member of the Vegetable Group. Vegetables may be fresh, frozen, canned, or dried. They can be served raw or cooked and may be whole, cut-up, or mashed. Make half your plate fruits and vegetables.     Grains: All foods made from grains are part of the Grains Group. These include wheat, rice, oats, cornmeal, and barley such as bread, pasta, oatmeal, cereal, tortillas, and grits. Grains  should be no more than a quarter of your plate. At least half of your grains should be whole grains.    Protein: This group includes meat, poultry, seafood, beans and peas, eggs, processed soy products (like tofu), nuts (including nut butters), and seeds. Make protein choices no more than a quarter of your plate. Meat and poultry choices should be lean or low fat.    Dairy: All fluid milk products and foods made from milk that contain calcium, like yogurt and cheese are part of the Dairy Group. (Foods that have little calcium, such as cream, butter, and cream cheese, are not part of the group.) Most dairy choices should be low-fat or fat-free.    Oils: These are fats that are liquid at room temperature. They include canola, corn, olive, soybean, and sunflower oil. Foods that are mainly oil include mayonnaise, certain salad dressings, and soft margarines. You should have only 5 to 7 teaspoons of oils a day. You probably already get this much from the food you eat.  Use "[employer] to Help Build Your Meals  The [employer] can help you plan and track your meals and activity. You can look up individual foods to see or compare their nutritional value. You can get guidelines for what and how much you should eat. You can compare your food choices. And you can assess personal physical activities and see ways you can improve. Go to www.[employer].gov/[employer]BLOX Inc.cker/.    6744-8166 [employer]. [address] [place] [address]. All rights reserved. This information is not intended as a substitute for professional medical care. Always follow your healthcare professional's instructions.           Patient Education   Urinary Incontinence, Female (Adult)  Urinary incontinence means loss of control of the bladder. This problem affects many women, especially as they get older. If you have incontinence, you may be embarrassed to ask for help. But know that this problem can be treated.  Types of  Incontinence  There are different types of incontinence. Two of the main types are described here. You can have more than one type.    Stress incontinence. With this type, urine leaks when pressure (stress) is put on the bladder. This may happen when you cough, sneeze, or laugh. Stress incontinence most often occurs because the pelvic floor muscles that support the bladder and urethra are weak. This can happen after pregnancy and vaginal childbirth or a hysterectomy. It can also be due to excess body weight or hormone changes.    Urge incontinence (also called overactive bladder). With this type, a sudden urge to urinate is felt often. This may happen even though there may not be much urine in the bladder. The need to urinate often during the night is common. Urge incontinence most often occurs because of bladder spasms. This may be due to bladder irritation or infection. Damage to bladder nerves or pelvic muscles, constipation, and certain medicines can also lead to urge incontinence.  Treatment of urinary incontinence depends on the cause. Further evaluation is needed to find the type you have. This will likely include an exam and certain tests. Based on the results, you and your healthcare provider can then plan treatment. Until a diagnosis is made, the home care tips below can help relieve symptoms.  Home care    Do pelvic floor muscle exercises, if they are prescribed. The pelvic floor muscles help support the bladder and urethra. Many women find that their symptoms improve when doing special exercises that strengthen these muscles. To do the exercises contract the muscles you would use to stop your stream of urine, but do this when youre not urinating. Hold for 10 seconds, then relax. Repeat 10 to 20 times in a row, at least 3 times a day. Your provider may give you other instructions for how to do the exercises and how often.    Keep a bladder diary. This helps track how often and how much you urinate over a  set period of time. Bring this diary with you to your next visit with the provider. The information can help your provider learn more about your bladder problem.    Lose weight, if advised to by your provider. Excess weight puts pressure on the bladder. Your provider can help you create a weight-loss plan thats right for you. This may include exercising more and making certain diet changes.    Don't consume foods and drinks that may irritate the bladder. These can include alcohol and caffeinated drinks.    Quit smoking. Smoking and other tobacco use can lead to chronic cough that strains the pelvic floor muscles. Smoking may also damage the bladder and urethra. Talk with your provider about treatments or methods you can use to quit smoking.    If drinking large amounts of fluid causes you to have symptoms, you may be advised to limit your fluid intake. You may also be advised to drink most of your fluids during the day and to limit fluids at night.    If youre worried about urine leakage or accidents, you may wear absorbent pads to catch urine. Change the pads often. This helps reduce discomfort. It may also reduce the risk of skin or bladder infections.  Follow-up care  Follow up with your healthcare provider, or as directed. It may take some to find the right treatment for your problem. Your treatment plan may include special therapies or medicines. Certain procedures or surgery may also be options. Be sure to discuss any questions you have with your provider.  When to seek medical advice  Call the healthcare provider right away if any of these occur:    Fever of 100.4 F (38 C) or higher, or as directed by your provider    Bladder pain or fullness    Abdominal swelling    Nausea or vomiting    Back pain    Weakness, dizziness or fainting  Date Last Reviewed: 10/1/2017    5572-8291 [employer]. [address] [place] [address], [place] [address]. All rights reserved. This information is not intended as a  substitute for professional medical care. Always follow your healthcare professional's instructions.       Advance Directive  Patients advance directive was discussed and I am comfortable with the patients wishes.  Patient Education   Personalized Prevention Plan  You are due for the preventive services outlined below.  Your care team is available to assist you in scheduling these services.  If you have already completed any of these items, please share that information with your care team to update in your medical record.  Health Maintenance   Topic Date Due   ? DXA SCAN  07/06/1998   ? ZOSTER VACCINES (2 of 3) 03/15/2012   ? INFLUENZA VACCINE RULE BASED (1) 08/01/2020   ? FALL RISK ASSESSMENT  02/11/2021   ? MEDICARE ANNUAL WELLNESS VISIT  09/03/2021   ? ADVANCE CARE PLANNING  10/09/2023   ? TD 18+ HE  10/02/2028   ? PNEUMOCOCCAL IMMUNIZATION 65+ LOW/MEDIUM RISK  Completed   ? HEPATITIS B VACCINES  Aged Out

## 2021-06-20 NOTE — LETTER
Letter by Abdulkadir Rodriguez MD at      Author: Abdulkadir Rodriguez MD Service: -- Author Type: --    Filed:  Encounter Date: 9/8/2020 Status: (Other)         Laine [address]  [place] [address]              September 8, 2020         Dear Ms. Sharma,    Below are the results from your recent visit:    Resulted Orders   Basic Metabolic Panel   Result Value Ref Range    Sodium 138 136 - 145 mmol/L    Potassium 3.4 (L) 3.5 - 5.0 mmol/L    Chloride 104 98 - 107 mmol/L    CO2 25 22 - 31 mmol/L    Anion Gap, Calculation 9 5 - 18 mmol/L    Glucose 95 70 - 125 mg/dL    Calcium 9.4 8.5 - 10.5 mg/dL    BUN 17 8 - 28 mg/dL    Creatinine 0.84 0.60 - 1.10 mg/dL    GFR MDRD Af Amer >60 >60 mL/min/1.73m2    GFR MDRD Non Af Amer >60 >60 mL/min/1.73m2    Narrative    Fasting Glucose reference range is 70-99 mg/dL per  American Diabetes Association (ADA) guidelines.   Hemoglobin   Result Value Ref Range    Hemoglobin 12.4 12.0 - 16.0 g/dL       Potassium was a little bit low, Laine.  I would try to eat more fruits and vegetables.  If it remains low, we might need to add a potassium pill or stop your pill with the diuretic.  Overall this looks good and I hope you are feeling somewhat better.  Please call with questions or contact us using [employer].    Sincerely,        Electronically signed by Abdulkadir Rodriguez MD        HISTORY OF PRESENT ILLNESS/HIV/REVIEW OF SYSTEMS/VITAL SIGNS( Pullset)/PHYSICAL EXAM/DISPOSITION/PAST MEDICAL/SURGICAL/SOCIAL HISTORY

## 2021-06-20 NOTE — LETTER
Letter by Ugo Garcia LGSW at      Author: Ugo Garcia LGSW Service: -- Author Type: --    Filed:  Encounter Date: 9/8/2020 Status: (Other)       CARE COORDINATION    September 8, 2020    Laine Sharma  [address]  [place] [address]      Dear Laine,    I am a clinic care coordinator who works with Abdulkadir Rodriguez MD at [facility]. I wanted to thank you for spending the time to talk with me.  Below is a description of clinic care coordination and how I can further assist you.      The clinic care coordination team is made up of a registered nurse,  and community health worker who understand the health care system. The goal of clinic care coordination is to help you manage your health and improve access to the health care system in the most efficient manner. The team can assist you in meeting your health care goals by providing education, coordinating services, strengthening the communication among your providers and supporting you with any resource needs.    Please feel free to contact the Community Health Worker at [phone] with any questions or concerns. We are focused on providing you with the highest-quality healthcare experience possible and that all starts with you.     Sincerely,     Ugo Garcia

## 2021-06-20 NOTE — PROGRESS NOTES
.  Assessment/ Plan  Problem List Items Addressed This Visit        High    Essential hypertension     Hypertension is well controlled  BP Readings from Last 3 Encounters:   10/09/18 132/78   10/02/18 116/42   12/21/17 112/52     Comment: Stopped bisoprolol last visit  Current antihypertensives amlodipine 10, losartan/hydrochlorothiazide 100/25  Plan: No change  Follow-up: 6 months              Unprioritized    Pes anserine bursitis - Primary     Mild today, has improved over the last 2-3 days and is only 1-week-old.  Follow, consider injection if not improving.             Subjective  CC:  Chief Complaint   Patient presents with     Follow-up     BP      Knee Pain     Leg pain      HPI:  Left knee Pain    Duration/ timing of onset: Last 3-4 days  Location of pain medial inferior to joint line  Severity/ Quality: Sharp, very severe for a time, could not walk on it, then got better  Modifying factors: Make it worse-bearing weight   make it better-ice and rest  History of knee problems/injury or previous work-up/ treatment?  Yes, patient was just seen here for knee pain, felt to be osteoarthritis and intra-articular steroid injection given.  She reported that that helped a great deal  Clicking or popping?  No  Swelling?  No      Also, patient is here for follow-up on hypertension.  Last visit, bisoprolol was stopped due to hypotension.  She continues to take losartan/hydrochlorothiazide 10/25.  Amlodipine 10 mg/day  PFSH:  Patient Active Problem List   Diagnosis     Diverticulosis     Esophageal reflux     Osteoarthritis     Hyperlipidemia     Essential hypertension     Healthcare maintenance     Pes anserine bursitis     Current medications reviewed as follows:  Current Outpatient Prescriptions on File Prior to Visit   Medication Sig     acetaminophen (TYLENOL) 325 MG tablet Take 650 mg by mouth every 6 (six) hours as needed for pain.     amLODIPine (NORVASC) 10 MG tablet Take 1 tablet (10 mg total) by mouth daily.      biotin 1 mg tablet Take 1,000 mcg by mouth daily.      losartan-hydrochlorothiazide (HYZAAR) 100-25 mg per tablet Take 1 tablet by mouth daily.     omeprazole (PRILOSEC OTC) 20 MG tablet Take 40 mg by mouth daily.     triamcinolone (KENALOG) 0.1 % cream Apply to rash twice daily.     No current facility-administered medications on file prior to visit.      History   Smoking Status     Never Smoker   Smokeless Tobacco     Never Used     Social History     Social History Narrative    Patient is  to Dom    Moved to [facility] in 2016         Patient Care Team:  Abdulkadir Rodriguez MD as PCP - General  ROS  As above, otherwise negative      Objective  Physical Exam  Vitals:    10/09/18 1556   BP: 132/78   Patient Site: Left Arm   Patient Position: Sitting   Cuff Size: Adult Regular   Pulse: 84   Resp: 28   Weight: 157 lb 12 oz (71.6 kg)     Body mass index is 32.41 kg/(m^2).  Left knee is normal on inspection.  There is no sign of effusion and overlying skin is not erythematous.  Palpation of the knee shows  No tenderness of the patella and no patellar compression test.  Note tenderness of the medial joint line.  No tenderness of the lateral joint line.  Exquisite tenderness over the past anserine region    Lockman's test is negative.  Jasmine's test is negative        Diagnostics:   None      Please note: Voice recognition software was used in this dictation.  It may therefore contain typographical errors.

## 2021-06-20 NOTE — PROGRESS NOTES
Assessment/ Plan  Problem List Items Addressed This Visit        High    Essential hypertension - Primary     Hypertension is overly well controlled-diastolic hypotension  BP Readings from Last 3 Encounters:   10/02/18 116/42   12/21/17 112/52   04/19/17 122/56     Comment: No symptoms of presyncope, somewhat bradycardic today  Current antihypertensives bisoprolol 1 mg, Hyzaar 100/25 and amlodipine 10 mg  Plan: Stop bisoprolol, follow-up 1 month, check BMP             Relevant Orders    Basic Metabolic Panel (Completed)    Hemoglobin (Completed)       Medium    Osteoarthritis     Sudden increase in symptoms, left knee, likely due to degenerative tear of medial meniscus-no obstructive symptoms  Patient requested knee injection, using Tylenol    Knee Arthrocentesis  Injection Procedure Note    Pre-operative Diagnosis: left,  Knee osteoarthritis    Post-operative Diagnosis: same    Indications: pain      Procedure Details   Verbal consent was obtained for procedure.  Area was prepped with Betadine sterilely.  Landmarks were palpated and 27-gauge 1-1/4 needle advanced from medial approach under the patella into the joint space.  Medications then injected.  Needle removed.      Complications:  None; patient tolerated the procedure well.            Unprioritized    Healthcare maintenance     Tetanus booster given today, will be getting flu shot elsewhere             Subjective  CC:  Chief Complaint   Patient presents with     Knee Pain     Left knee pain. Started after walking.      HPI:  L Knee Pain    Duration/ timing of onset: 2 weeks ago  Location of pain - posterior and medial  Severity/ Quality: very severe at first- needed walker-   Bought at estate sale-   Modifying factors: Make it worse- walking   Make it better- rest- tylenol was help   History of knee problems/injury or previous work-up/ treatment? yes  Clicking or popping?  No  Swelling?  Not noted  Comments previous injection did help      She is here also  to follow-up on hypertension.  Noted blood pressure to be somewhat low at this time.  Have try to cut back in her antihypertensives in the past and blood pressure rebounded quite dramatically.  Is been on bisoprolol for many years  PFSH:  Patient Active Problem List   Diagnosis     Diverticulosis     Esophageal reflux     Osteoarthritis     Hyperlipidemia     Essential hypertension     Healthcare maintenance     Current medications reviewed as follows:  Current Outpatient Prescriptions on File Prior to Visit   Medication Sig     acetaminophen (TYLENOL) 325 MG tablet Take 650 mg by mouth every 6 (six) hours as needed for pain.     amLODIPine (NORVASC) 10 MG tablet Take 1 tablet (10 mg total) by mouth daily.     biotin 1 mg tablet Take 1,000 mcg by mouth daily.      losartan-hydrochlorothiazide (HYZAAR) 100-25 mg per tablet Take 1 tablet by mouth daily.     omeprazole (PRILOSEC OTC) 20 MG tablet Take 40 mg by mouth daily.     triamcinolone (KENALOG) 0.1 % cream Apply to rash twice daily.     [DISCONTINUED] bisoprolol (ZEBETA) 5 MG tablet Take 1 tablet (5 mg total) by mouth daily.     No current facility-administered medications on file prior to visit.      History   Smoking Status     Never Smoker   Smokeless Tobacco     Never Used     Social History     Social History Narrative    Patient is  to Dom    Moved to [facility] in 2016         Patient Care Team:  Abdulkadir Rodriguez MD as PCP - General  ROS  Full 10 system review including constitutional, respiratory, cardiac, gi, urinary, rheumatologic, neurologic, reproductive, dermatologic psychiatric is  performed (via questionnaire) and is negative         Objective  Physical Exam  Vitals:    10/02/18 1128   BP: 116/42   Patient Site: Left Arm   Patient Position: Sitting   Cuff Size: Adult Regular   Pulse: (!) 52   Resp: 20   Weight: 159 lb 4 oz (72.2 kg)     Body mass index is 32.72 kg/(m^2).  Gen- alert, oriented/ appropriately responsive  HEENT- normal  cephalic, atraumatic.   Chest- Normal inspiration and expiration.    Clear to ascultation.    No chest wall deformity or scar.  CV- Heart regular rate and rhythm  normal tones, no murmurs   No gallops or rubs.  Ext- appear well perfused, no edema  Skin- warm and dry,   no visualized rash  Left knee is normal on inspection.  There is no sign of effusion and overlying skin is not erythematous.  Palpation of the knee shows  No tenderness of the patella and negative patellar compression test.  Moderate tenderness of the medial joint line.  No tenderness of the lateral joint line.  There is no tenderness with stress of the MCL or LCL.  Lockman's test is negative.  Jasmine's test is quite positive, cannot flex knee without significant pain        Diagnostics:   None, reviewed previous x-ray      Please note: Voice recognition software was used in this dictation.  It may therefore contain typographical errors.

## 2021-06-21 NOTE — PROGRESS NOTES
[facility] Daily Progress     Patient Name: Laine Sharma  Date: 11/6/2018  Visit #: 2/12  Referral Diagnosis: L knee pain  Referring provider: Eda Valencia MD  Visit Diagnosis:     ICD-10-CM    1. Left medial knee pain M25.562    2. Abnormality of gait R26.9        Assessment:     Patient is a 85 y.o. female that presents with signs and symptoms consistent with left medial knee pain secondary to arthritic changes and increased swelling around knee joint. Patient demonstrates impairments including decreased knee range of motion, flexibility and joint mobility, with poor quad control and limited patellar mobility leading to impaired functional mobility. Patient's functional limitations include climbing stairs, standing or walking for longer than 30 minutes, carrying groceries and kneeling to the ground.    Today patient reports she is feeling better, demonstrates improved gait and more mobility.    HEP/POC compliance is  good .  Patient demonstrates understanding/independence with home program.  Patient is appropriate to continue with skilled physical therapy intervention, as indicated by initial plan of care.    Goal Status:  Pt. will be independent with home exercise program in : 4 weeks  Pt. will show improved balance for safer : ambulation;for dressing/grooming;for household ambulation;for community ambulation;for exercise/recreation;in 6 weeks  Pt. will be able to walk : 20 minutes;on uneven/inclined surfaces;on even surfaces;with less pain;with less difficulty;for household mobility;for community mobility;for exercise/recreation;in 6 weeks  Pt. will bend: to dress;to clean;with no pain;with less difficulty;for self care;in 6 weeks  Patient will ascend / descend: stairs;step;with recipricol gait;with railing;with no pain;with less difficulty;in 6 weeks  Patient will transfer: sit/stand;supine/sit;for in/out of chair;with no pain;with less difficulty;in 6 weeks  No Data Recorded      Plan /  Patient Education:     Continue with initial plan of care.  Progress with home program as tolerated.    Plan for next visit: review HEP, manual therapy to posterior knee, stair ambulation, SL balance exercises, glute strength and quad control, total gym, yoga ball squats    Subjective:     Pain Ratin  Patient reports she is feeling good with her exercises, she doesn't feel so stiff when she gets up in the morning. Mainly feeling her knee pain in the front of the knee, she feels it when she walks and when she goes to bed. She can get around well in the house to cook and to stand.     Functional limitations are described as occurring with:   ascending and descending stairs or curbs  bending  performing routine daily activities  standing for longer than 30 minutes  transitional movements getting in  bed and car and getting out of  bed and car  walking for longer than 30 minutes  carrying laundry/groceries    Objective:       Treatment Today       Patient Education: Patient was educated on continuing plan of care, progress and review of current HEP. Patient educated on importance of consistency with exercise and therapy, as well as activity modification in order to see change and improvements. Patient demonstrated and verbalized understanding.     Manual Therapy:  MFR to medial and posterior knee joint - very inflamed with TTP that decreased with treatment - patient was able to ambulate more appropriately with less pain after treatment    Exercises:  Exercise #1: supine quad sets - hold 5 sec x 10  Comment #1: seated hamstring stretch at edge of bed - hold 30 sec x 2  Exercise #2: standing gastroc stretch at wall - hold 30 sec x 2  Comment #2: already doing SLR and sidelying hip abduction and standing hip abduction as HEP      TREATMENT MINUTES COMMENTS   Evaluation     Self-care/ Home management     Manual therapy 15 MFR to medial and posterior knee joint - very inflamed with TTP that decreased with treatment -  patient was able to ambulate more appropriately with less pain after treatment   Neuromuscular Re-education     Therapeutic Activity     Therapeutic Exercises 10 See above flowsheet  Nustep 3 min  SL balance exercise  Stair ambulation  HF stretch   Gait training     Modality__________________                Total 25    Blank areas are intentional and mean the treatment did not include these items.       Muriel Forman, PT  11/6/2018

## 2021-06-21 NOTE — PROGRESS NOTES
Assessment/ Plan  Problem List Items Addressed This Visit        Unprioritized    Pes anserine bursitis - Primary     Superimposed on osteoarthritis of the left knee.  Pretty clear that she has distinct symptoms from her osteoarthritis    Discussed option of injection.  Discussed uncertainty, especially with knee effusion, of my diagnosis      Past anserine bursa injection left knee  Discussed risks and benefits of injection with patient including nerve and artery injury as well as infection  Patient agreed to risks/requested injection  Sterilely prepped wrist with alcohol  Located landmarks and palpated for area of maximal tenderness on medial knee  Using 27-gauge 1-1/4 needle injected 1 cc of lidocaine with 1/2 cc of 40 mg Depo-Medrol  Patient tolerated this well.    She had immediate relief of pain following this injection             Subjective  CC:  Chief Complaint   Patient presents with     Knee Pain     Left knee      HPI:  85-year-old who comes in for recurrent left knee pain  Duration/ timing of onset about 10 days in duration now  Location of pain medial inferior to joint line  Severity/ Quality: Sharp, very severe for a time, could not walk on it, then got better  Modifying factors: Make it worse-bearing weight   make it better-ice and rest  History of knee problems/injury or previous work-up/ treatment?  Yes, patient was just seen here for knee pain, felt to be osteoarthritis and intra-articular steroid injection given.  She reported that that helped a great deal.  Pain now is different.  Clicking or popping?  No  Swelling?    Yes, that there is now significant swelling about the knee.  PFSH:  Patient Active Problem List   Diagnosis     Diverticulosis     Esophageal reflux     Osteoarthritis     Hyperlipidemia     Essential hypertension     Healthcare maintenance     Pes anserine bursitis     Current medications reviewed as follows:  Current Outpatient Prescriptions on File Prior to Visit   Medication  Sig     acetaminophen (TYLENOL) 325 MG tablet Take 650 mg by mouth every 6 (six) hours as needed for pain.     amLODIPine (NORVASC) 10 MG tablet Take 1 tablet (10 mg total) by mouth daily.     biotin 1 mg tablet Take 1,000 mcg by mouth daily.      losartan-hydrochlorothiazide (HYZAAR) 100-25 mg per tablet Take 1 tablet by mouth daily.     omeprazole (PRILOSEC OTC) 20 MG tablet Take 40 mg by mouth daily.     triamcinolone (KENALOG) 0.1 % cream Apply to rash twice daily.     Current Facility-Administered Medications on File Prior to Visit   Medication     lidocaine 10 mg/mL (1 %) injection 1 mL     methylPREDNISolone acetate injection 40 mg (DEPO-MEDROL)     History   Smoking Status     Never Smoker   Smokeless Tobacco     Never Used     Social History     Social History Narrative    Patient is  to Dom    Moved to [facility] in 2016         Patient Care Team:  Abdulkadir Rodriguez MD as PCP - General  ROS  As above, otherwise negative      Objective  Physical Exam  Vitals:    10/16/18 1112   BP: 126/52   Patient Site: Right Arm   Patient Position: Sitting   Cuff Size: Adult Regular   Pulse: 76   Resp: 28   Weight: 159 lb 12 oz (72.5 kg)     Body mass index is 32.82 kg/(m^2).  Left knee is abnormal on inspection.  There is an effusion of the knee.  Palpation of the knee shows  No tenderness of the patella and negative patellar compression test.  No tenderness of the medial joint line.  Negative tenderness of the lateral joint line.  Exquisite tenderness over the past anserine bursa          Diagnostics:   None      Please note: Voice recognition software was used in this dictation.  It may therefore contain typographical errors.

## 2021-06-21 NOTE — PROGRESS NOTES
[facility] Daily Progress     Patient Name: Laine Sharma  Date: 11/13/2018  Visit #: 3/12  Referral Diagnosis: L knee pain  Referring provider: Eda Valencia MD  Visit Diagnosis:     ICD-10-CM    1. Left medial knee pain M25.562    2. Abnormality of gait R26.9        Assessment:     Patient is a 85 y.o. female that presents with signs and symptoms consistent with left medial knee pain secondary to arthritic changes and increased swelling around knee joint. Patient demonstrates impairments including decreased knee range of motion, flexibility and joint mobility, with poor quad control and limited patellar mobility leading to impaired functional mobility. Patient's functional limitations include climbing stairs, standing or walking for longer than 30 minutes, carrying groceries and kneeling to the ground.    Today patient reports she is feeling better, demonstrates improved gait and more mobility.    HEP/POC compliance is  good .  Patient demonstrates understanding/independence with home program.  Patient is appropriate to continue with skilled physical therapy intervention, as indicated by initial plan of care.    Goal Status:  Pt. will be independent with home exercise program in : 4 weeks  Pt. will show improved balance for safer : ambulation;for dressing/grooming;for household ambulation;for community ambulation;for exercise/recreation;in 6 weeks  Pt. will be able to walk : 20 minutes;on uneven/inclined surfaces;on even surfaces;with less pain;with less difficulty;for household mobility;for community mobility;for exercise/recreation;in 6 weeks  Pt. will bend: to dress;to clean;with no pain;with less difficulty;for self care;in 6 weeks  Patient will ascend / descend: stairs;step;with recipricol gait;with railing;with no pain;with less difficulty;in 6 weeks  Patient will transfer: sit/stand;supine/sit;for in/out of chair;with no pain;with less difficulty;in 6 weeks      Plan / Patient Education:      Continue with initial plan of care.  Progress with home program as tolerated.    Plan for next visit: review HEP, manual therapy to posterior knee, stair ambulation, SL balance exercises, glute strength and quad control, total gym, yoga ball squats    Subjective:     Pain Rating: 3  Patient is still putting ice on the knee, she feels it gets stiff with walking. She forgets about it and then when she starts walking again she can feel it. She knows that she is walking a lot better, she visited her friend yesterday who had steps, she was able to do them but going up she was feeling more L sided pain - was educated on going up with good and down with bad. Mainly feeling her knee pain in the front of the knee, she feels it when she walks and when she goes to bed. She can get around well in the house to cook and to stand.     Functional limitations are described as occurring with:   ascending and descending stairs or curbs  bending  performing routine daily activities  standing for longer than 30 minutes  transitional movements getting in  bed and car and getting out of  bed and car  walking for longer than 30 minutes  carrying laundry/groceries    Objective:       Treatment Today       Patient Education: Patient was educated on continuing plan of care, progress and review of current HEP. Patient educated on importance of consistency with exercise and therapy, as well as activity modification in order to see change and improvements. Patient demonstrated and verbalized understanding.     Manual Therapy:  MFR to medial and posterior knee joint - very inflamed with TTP that decreased with treatment - patient was able to ambulate more appropriately with less pain after treatment    Exercises:  Exercise #1: supine quad sets - hold 5 sec x 10  Comment #1: seated hamstring stretch at edge of bed - hold 30 sec x 2  Exercise #2: standing gastroc stretch at wall - hold 30 sec x 2  Comment #2: already doing SLR and sidelying hip  abduction and standing hip abduction as HEP        TREATMENT MINUTES COMMENTS   Evaluation     Self-care/ Home management     Manual therapy 20 MFR to medial and posterior knee joint - very inflamed with TTP that decreased with treatment - patient was able to ambulate more appropriately with less pain after treatment   Neuromuscular Re-education     Therapeutic Activity     Therapeutic Exercises 10 See above flowsheet  Nustep 3 min  SL balance exercise  Stair ambulation  HF stretch  Crabwalks in parallel bars  Total gym squats x 20 lv 16   Gait training     Modality__________________                Total 30    Blank areas are intentional and mean the treatment did not include these items.       Muriel Forman, PT  11/13/2018

## 2021-06-21 NOTE — PROGRESS NOTES
[facility] Office Visit    Chief Complaint:  Chief Complaint   Patient presents with     follow up Lt knee pain     injection did not help         Assessment/Plan:  1. Acute pain of left knee  Based on the ongoing pain despite intra-articular knee injection and injection in the bursa we will proceed with x-ray of the left knee.  Was reviewed by myself and no gross abnormality seen, in fact she has pretty minimal osteoarthritis based on her age and symptoms.  I would recommend topical NSAIDs and physical therapy to reduce her pain and improve her functioning back to normal.  - XR Knee Left 1 or 2 VWS; Future  - diclofenac sodium (VOLTAREN) 1 % Gel; Apply topically to left knee 4x/day as needed for pain  Dispense: 100 g; Refill: 1  - Ambulatory referral to PT/OT      Return if symptoms worsen or fail to improve.    Patient Education/AVS:  There are no Patient Instructions on file for this visit.    HPI:   Laine Sharma is a 85 y.o. female c/o left knee pain for past month that started the day after taking a long walk with her - about a mile.  Made it hard to walk due to pain.  Would come and go but never went away.  Stays active with volunteering but spends a lot of time sitting-  Pain does seem to be worse with bouts of sitting.  Had 2 injections - first one helped a little but not very long.  2nd injection helped for a couple of days but the pain came back again.  Pain at this point is lower medial corner of the left knee.  Sharp pain when she steps down on this knee.  Uses a cane at this point due to knee pain and afraid of falling if her pain hurts too much.  Also worried that she will overwork her good leg too much.  Uses a walker at home.  Has been using icy hot, icing the knee and tylenol 2x/day with some relief but pain always comes back again.      ROS:  Constitutional, CV, Resp, GI, , MSK, skin, neuro, psych all negative except as outlined in the HPI above and patient denies any  other symptoms.      History summarized from1-2:10/2/18- seen by dr waterman for acute left knee pain x 2 weeks, severe.  Felt to be likely related to OA and intraarticular cortisone injeciton done.    Returned to see Dr Calderon 10/9/18 - pain got a little better but then bad again for 3-4 days.  Felt to be possible pes anserine bersitits and treated with conservative tx.    F/u on 10/16/18 for left knee pain pes anserine bursitits and tx with bursa injection with improved pain immediately.     Radiology tests reviewed-1: right knee xray 2016- mild tricompartmental OA.    Lab tests reviewed-1: No uric acid on file.    Physical Exam:  /70 (Patient Site: Left Arm, Patient Position: Sitting, Cuff Size: Adult Regular)  Pulse 88  Wt 157 lb (71.2 kg)  BMI 32.25 kg/m2 Body mass index is 32.25 kg/(m^2). No LMP recorded. Patient is postmenopausal.  Vital signs reviewed  Wt Readings from Last 3 Encounters:   10/22/18 157 lb (71.2 kg)   10/16/18 159 lb 12 oz (72.5 kg)   10/09/18 157 lb 12 oz (71.6 kg)     History   Smoking Status     Never Smoker   Smokeless Tobacco     Never Used     History   Sexual Activity     Sexual activity: Not on file     No Data Recorded  No Data Recorded  PHQ-2 Total Score: 0 (10/2/2018 11:28 AM)  No Data Recorded    All normal as below except abnormalities include: Patient is alert and oriented x3.  She does walk with a limp favoring her left leg.  Sitting comfortably in the exam room although frequently rubbing her left knee.  Pain predominantly on the lower medial aspect of her left knee more in the bursa distribution.  Knee joint is intact no loose ligaments appreciated today.  No significant effusion erythema or warmth appreciated today.  Normal skin exam.  No bony deformity noted today.  Normal left hip exam and left ankle exams today.  No swelling noted in the left lower extremity.  Normal pulses in the left lower extremity.  General is a  85 y.o. female sitting comfortably in no  apparent distress.   Extremities: Warm, No Edema, 2+ Pedal and radial pulses bilaterally  Skin: No lesions or rashes noted    Results for orders placed or performed in visit on 10/02/18   Basic Metabolic Panel   Result Value Ref Range    Sodium 140 136 - 145 mmol/L    Potassium 3.8 3.5 - 5.0 mmol/L    Chloride 103 98 - 107 mmol/L    CO2 26 22 - 31 mmol/L    Anion Gap, Calculation 11 5 - 18 mmol/L    Glucose 108 70 - 125 mg/dL    Calcium 9.2 8.5 - 10.5 mg/dL    BUN 28 8 - 28 mg/dL    Creatinine 1.24 (H) 0.60 - 1.10 mg/dL    GFR MDRD Af Amer 50 (L) >60 mL/min/1.73m2    GFR MDRD Non Af Amer 41 (L) >60 mL/min/1.73m2   Hemoglobin   Result Value Ref Range    Hemoglobin 12.3 12.0 - 16.0 g/dL       Med list and active problem list reviewed and updated as part of this encounter    Current Outpatient Prescriptions on File Prior to Visit   Medication Sig Dispense Refill     acetaminophen (TYLENOL) 325 MG tablet Take 650 mg by mouth every 6 (six) hours as needed for pain.       amLODIPine (NORVASC) 10 MG tablet Take 1 tablet (10 mg total) by mouth daily. 90 tablet 1     biotin 1 mg tablet Take 1,000 mcg by mouth daily.        losartan-hydrochlorothiazide (HYZAAR) 100-25 mg per tablet Take 1 tablet by mouth daily. 90 tablet 2     omeprazole (PRILOSEC OTC) 20 MG tablet Take 40 mg by mouth daily.       triamcinolone (KENALOG) 0.1 % cream Apply to rash twice daily. 30 g 6     No current facility-administered medications on file prior to visit.          Eda Valencia MD

## 2021-06-21 NOTE — PROGRESS NOTES
[facility] Discharge Summary  Patient Name: Laine Sharma  Date: 12/21/2018  Referral Diagnosis: L knee pain  Referring provider: Eda Valencia MD  Visit Diagnosis:   1. Left medial knee pain     2. Abnormality of gait         Goals:  Pt. will be independent with home exercise program in : 4 weeks  Pt. will show improved balance for safer : ambulation;for dressing/grooming;for household ambulation;for community ambulation;for exercise/recreation;in 6 weeks  Pt. will be able to walk : 20 minutes;on uneven/inclined surfaces;on even surfaces;with less pain;with less difficulty;for household mobility;for community mobility;for exercise/recreation;in 6 weeks  Pt. will bend: to dress;to clean;with no pain;with less difficulty;for self care;in 6 weeks  Patient will ascend / descend: stairs;step;with recipricol gait;with railing;with no pain;with less difficulty;in 6 weeks  Patient will transfer: sit/stand;supine/sit;for in/out of chair;with no pain;with less difficulty;in 6 weeks    No Data Recorded    Patient was seen for 5 visits for physical therapy of L knee pain from 10/31/18 to 11/27/18 with no follow up appointments.   The patient met goals and has demonstrated understanding of and independence in the home program for self-care, and progression to next steps.  She will initiate contact if questions or concerns arise.  The patient reports feeling better and did not wish to schedule further therapy at this time.  No further therapy is required at this time.    Therapy will be discontinued at this time.  The patient will need a new referral to resume physical therapy treatment. Please see below for patient's current status.    Thank you for your referral.  Muriel Forman, PT, DPT  12/21/2018   11:38 AM      [facility] Daily Progress     Patient Name: Laine Sharma  Date: 11/27/2018  Visit #: 5/12  Referral Diagnosis: L knee pain  Referring provider: Eda Valencia MD  Visit Diagnosis:      ICD-10-CM    1. Left medial knee pain M25.562    2. Abnormality of gait R26.9        Assessment:     Patient is a 85 y.o. female that presents with signs and symptoms consistent with left medial knee pain secondary to arthritic changes and increased swelling around knee joint. Patient demonstrates impairments including decreased knee range of motion, flexibility and joint mobility, with poor quad control and limited patellar mobility leading to impaired functional mobility. Patient's functional limitations include climbing stairs, standing or walking for longer than 30 minutes, carrying groceries and kneeling to the ground.    Today patient reports she is feeling better, demonstrates improved gait and more mobility. Feels it mostly when the weather is gloomy and when she stands for longer periods of time, she knows she has to keep the knee moving and not in one position for too long. Patient has met goals and will trial independence.      HEP/POC compliance is  good .  Patient demonstrates understanding/independence with home program.  Patient is appropriate to continue with skilled physical therapy intervention, as indicated by initial plan of care.    Goal Status:  Pt. will be independent with home exercise program in : 4 weeks  Pt. will show improved balance for safer : ambulation;for dressing/grooming;for household ambulation;for community ambulation;for exercise/recreation;in 6 weeks  Pt. will be able to walk : 20 minutes;on uneven/inclined surfaces;on even surfaces;with less pain;with less difficulty;for household mobility;for community mobility;for exercise/recreation;in 6 weeks  Pt. will bend: to dress;to clean;with no pain;with less difficulty;for self care;in 6 weeks  Patient will ascend / descend: stairs;step;with recipricol gait;with railing;with no pain;with less difficulty;in 6 weeks  Patient will transfer: sit/stand;supine/sit;for in/out of chair;with no pain;with less difficulty;in 6 weeks      Plan  / Patient Education:     Continue with initial plan of care.  Progress with home program as tolerated.    Plan for next visit: review HEP, manual therapy to posterior knee, stair ambulation, SL balance exercises, glute strength and quad control, total gym, yoga ball squats    Subjective:     Pain Ratin  Patient hasn't been feeling the best in her knee due to the gloomy weather and when it is damp. She has been walking at the [facility] with her friend. Still putting ice on it, especially after she is done with activity for the day. Patient was standing quite a bit the last few days for baking, and she tends to  one position for too long.     Functional limitations are described as occurring with:   ascending and descending stairs or curbs  bending  performing routine daily activities  standing for longer than 30 minutes  transitional movements getting in  bed and car and getting out of  bed and car  walking for longer than 30 minutes  carrying laundry/groceries    Objective:       Treatment Today       Patient Education: Patient was educated on continuing plan of care, progress and review of current HEP. Patient educated on importance of consistency with exercise and therapy, as well as activity modification in order to see change and improvements. Patient demonstrated and verbalized understanding.     Manual Therapy:  MFR to medial and posterior knee joint - very inflamed with TTP that decreased with treatment - patient was able to ambulate more appropriately with less pain after treatment    Exercises:  Exercise #1: supine quad sets - hold 5 sec x 10  Comment #1: seated hamstring stretch at edge of bed - hold 30 sec x 2  Exercise #2: standing gastroc stretch at wall - hold 30 sec x 2  Comment #2: already doing SLR and sidelying hip abduction and standing hip abduction as HEP        TREATMENT MINUTES COMMENTS   Evaluation     Self-care/ Home management     Manual therapy 23 MFR to medial and posterior knee  joint - very inflamed with TTP that decreased with treatment - patient was able to ambulate more appropriately with less pain after treatment   Neuromuscular Re-education     Therapeutic Activity     Therapeutic Exercises 3 See above flowsheet  Nustep 3 min  SL balance exercise  Stair ambulation  HF stretch  Crabwalks in parallel bars  Total gym squats x 20 lv 16   Gait training     Modality__________________                Total 26    Blank areas are intentional and mean the treatment did not include these items.       Muriel Forman, PT  11/27/2018

## 2021-06-21 NOTE — PROGRESS NOTES
[facility] Daily Progress     Patient Name: Laine Sharma  Date: 11/20/2018  Visit #: 4/12  Referral Diagnosis: L knee pain  Referring provider: Eda Valencia MD  Visit Diagnosis:     ICD-10-CM    1. Left medial knee pain M25.562    2. Abnormality of gait R26.9        Assessment:     Patient is a 85 y.o. female that presents with signs and symptoms consistent with left medial knee pain secondary to arthritic changes and increased swelling around knee joint. Patient demonstrates impairments including decreased knee range of motion, flexibility and joint mobility, with poor quad control and limited patellar mobility leading to impaired functional mobility. Patient's functional limitations include climbing stairs, standing or walking for longer than 30 minutes, carrying groceries and kneeling to the ground.    Today patient reports she is feeling better, demonstrates improved gait and more mobility.    HEP/POC compliance is  good .  Patient demonstrates understanding/independence with home program.  Patient is appropriate to continue with skilled physical therapy intervention, as indicated by initial plan of care.    Goal Status:  Pt. will be independent with home exercise program in : 4 weeks  Pt. will show improved balance for safer : ambulation;for dressing/grooming;for household ambulation;for community ambulation;for exercise/recreation;in 6 weeks  Pt. will be able to walk : 20 minutes;on uneven/inclined surfaces;on even surfaces;with less pain;with less difficulty;for household mobility;for community mobility;for exercise/recreation;in 6 weeks  Pt. will bend: to dress;to clean;with no pain;with less difficulty;for self care;in 6 weeks  Patient will ascend / descend: stairs;step;with recipricol gait;with railing;with no pain;with less difficulty;in 6 weeks  Patient will transfer: sit/stand;supine/sit;for in/out of chair;with no pain;with less difficulty;in 6 weeks      Plan / Patient Education:      Continue with initial plan of care.  Progress with home program as tolerated.    Plan for next visit: review HEP, manual therapy to posterior knee, stair ambulation, SL balance exercises, glute strength and quad control, total gym, yoga ball squats    Subjective:     Pain Rating: 3  Patient is still putting ice on the knee 1x/day, she feels it gets stiff with walking. She forgets about it and then when she starts walking again she can feel it. She knows that she is walking a lot better. Patient started baking last Sunday but it didn't increase her knee pain, she was surprised, she was having some pain yesterday and Sunday, mainly due to the weather.     Functional limitations are described as occurring with:   ascending and descending stairs or curbs  bending  performing routine daily activities  standing for longer than 30 minutes  transitional movements getting in  bed and car and getting out of  bed and car  walking for longer than 30 minutes  carrying laundry/groceries    Objective:       Treatment Today       Patient Education: Patient was educated on continuing plan of care, progress and review of current HEP. Patient educated on importance of consistency with exercise and therapy, as well as activity modification in order to see change and improvements. Patient demonstrated and verbalized understanding.     Manual Therapy:  MFR to medial and posterior knee joint - very inflamed with TTP that decreased with treatment - patient was able to ambulate more appropriately with less pain after treatment    Exercises:  Exercise #1: supine quad sets - hold 5 sec x 10  Comment #1: seated hamstring stretch at edge of bed - hold 30 sec x 2  Exercise #2: standing gastroc stretch at wall - hold 30 sec x 2  Comment #2: already doing SLR and sidelying hip abduction and standing hip abduction as HEP        TREATMENT MINUTES COMMENTS   Evaluation     Self-care/ Home management     Manual therapy 25 MFR to medial and  posterior knee joint - very inflamed with TTP that decreased with treatment - patient was able to ambulate more appropriately with less pain after treatment   Neuromuscular Re-education     Therapeutic Activity     Therapeutic Exercises 3 See above flowsheet  Nustep 3 min  SL balance exercise  Stair ambulation  HF stretch  Crabwalks in parallel bars  Total gym squats x 20 lv 16   Gait training     Modality__________________                Total 28    Blank areas are intentional and mean the treatment did not include these items.       Muriel Forman, PT  11/20/2018

## 2021-06-21 NOTE — PROGRESS NOTES
[facility] Certification Request    October 31, 2018      Patient: Laine Sharma  MR Number: [id number]  YOB: 1933  Date of Visit: 10/31/2018      Dear Dr. Valencia,    Thank you for this referral.   We are seeing Laine Sharma for Physical Therapy of acute left knee pain.    Medicare and/or Medicaid requires physician review and approval of the treatment plan. Please review the plan of care and verify that you agree with the therapy plan of care by co-signing this note.      Plan of Care  Authorization / Certification Start Date: 10/31/18  Authorization / Certification End Date: 01/30/19  Authorization / Certification Number of Visits: 12  Communication with: Referral Source  Patient Related Instruction: Nature of Condition;Treatment plan and rationale;Self Care instruction;Basis of treatment;Body mechanics;Posture;Next steps;Expected outcome  Times per Week: 1-2  Number of Weeks: 6-8  Number of Visits: 12  Discharge Planning: independent HEP and/or plateau of progress  Therapeutic Exercise: ROM;Stretching;Strengthening  Neuromuscular Reeducation: kinesio tape;posture;balance/proprioception;TNE;core  Manual Therapy: soft tissue mobilization;myofascial release;joint mobilization;muscle energy  Modalities: TENS;ultrasound  Gait Training: as indicated    Goals:  Pt. will be independent with home exercise program in : 4 weeks  Pt. will show improved balance for safer : ambulation;for dressing/grooming;for household ambulation;for community ambulation;for exercise/recreation;in 6 weeks  Pt. will be able to walk : 20 minutes;on uneven/inclined surfaces;on even surfaces;with less pain;with less difficulty;for household mobility;for community mobility;for exercise/recreation;in 6 weeks  Pt. will bend: to dress;to clean;with no pain;with less difficulty;for self care;in 6 weeks  Patient will ascend / descend: stairs;step;with recipricol gait;with railing;with no pain;with less difficulty;in 6  weeks  Patient will transfer: sit/stand;supine/sit;for in/out of chair;with no pain;with less difficulty;in 6 weeks  No Data Recorded      If you have any questions or concerns, please don't hesitate to call.    Sincerely,      Muriel Forman, PT  [phone]      Physician recommendation:     ___ Follow therapist's recommendation        ___ Modify therapy      *Physician co-signature indicates they certify the need for these services furnished within this plan and while under their care.          [facility]   Knee Initial Evaluation    Patient Name: Laine Sharma  Date of evaluation: 10/31/2018  Referral Diagnosis: Acute pain of left knee  Referring provider: Eda Valencia MD  Visit Diagnosis:     ICD-10-CM    1. Left medial knee pain M25.562    2. Abnormality of gait R26.9        Assessment:        Patient is a 85 y.o. female that presents with signs and symptoms consistent with left medial knee pain secondary to arthritic changes and increased swelling around knee joint. Patient demonstrates impairments including decreased knee range of motion, flexibility and joint mobility, with poor quad control and limited patellar mobility leading to impaired functional mobility. Patient's functional limitations include climbing stairs, standing or walking for longer than 30 minutes, carrying groceries and kneeling to the ground. Today patient responded well to manual therapy and therapeutic exercise - demonstrating improved gait pattern after treatment session today with much improved pain symptoms.    Patient educated on and demonstrated understanding of nature of impairment, plan of care, patient role and HEP. Patient compliant with PT and prognosis is good. Patient would benefit from skilled PT to progress and improve above impairments.      The POC is dynamic and will be modified on an ongoing basis.  Patient will return to clinic if symptoms persist.  Barriers to achieving goals as noted in the  assessment section may affect outcome.  Prognosis to achieve goals is  good   Pt. is appropriate for skilled PT intervention as outlined in the Plan of Care (POC).  Pt. is a good candidate for skilled PT services to improve pain levels and function.    Goals:  Pt. will be independent with home exercise program in : 4 weeks  Pt. will show improved balance for safer : ambulation;for dressing/grooming;for household ambulation;for community ambulation;for exercise/recreation;in 6 weeks  Pt. will be able to walk : 20 minutes;on uneven/inclined surfaces;on even surfaces;with less pain;with less difficulty;for household mobility;for community mobility;for exercise/recreation;in 6 weeks  Pt. will bend: to dress;to clean;with no pain;with less difficulty;for self care;in 6 weeks  Patient will ascend / descend: stairs;step;with recipricol gait;with railing;with no pain;with less difficulty;in 6 weeks  Patient will transfer: sit/stand;supine/sit;for in/out of chair;with no pain;with less difficulty;in 6 weeks  No Data Recorded    Patient's expectations/goals are realistic.    Barriers to Learning or Achieving Goals:  Chronicity of the problem.       Plan / Patient Instructions:        Plan of Care:   Authorization / Certification Start Date: 10/31/18  Authorization / Certification End Date: 01/30/19  Authorization / Certification Number of Visits: 12  Communication with: Referral Source  Patient Related Instruction: Nature of Condition;Treatment plan and rationale;Self Care instruction;Basis of treatment;Body mechanics;Posture;Next steps;Expected outcome  Times per Week: 1-2  Number of Weeks: 6-8  Number of Visits: 12  Discharge Planning: independent HEP and/or plateau of progress  Therapeutic Exercise: ROM;Stretching;Strengthening  Neuromuscular Reeducation: kinesio tape;posture;balance/proprioception;TNE;core  Manual Therapy: soft tissue mobilization;myofascial release;joint mobilization;muscle energy  Modalities:  TENS;ultrasound  Gait Training: as indicated    POC and pathology of condition were reviewed with patient.  Pt. is in agreement with the Plan of Care  A Home Exercise Program (HEP) was initiated today.  Pt. was instructed in exercises by PT and patient was given a handout with detailed instructions.    Plan for next visit: review HEP, manual therapy to posterior knee, stair ambulation, SL balance exercises, glute strength and quad control, HF stretch     Subjective:        History of Present Illness:    Laine is a 85 y.o. female who presents to therapy today with complaints of left knee pain. Date of onset is 2018 and onset was when her knee started hurting when she went for a walk, this isn't the first time. Symptoms are intermittent and not improving. Patient reports when she walks she can feel it on the inside of her left knee. If she walks for about an hour, she has to sit down for about 10 minutes in order to get rid of the pain.  She reports a constant  history of similar symptoms. She describes their previous level of function as not limited.     Pain Ratin  Pain rating at best: 2  Pain rating at worst: 8  Pain description:dull - it seems like when she walks she can feel it    Functional limitations are described as occurring with:   ascending and descending stairs or curbs  bending  performing routine daily activities  standing for longer than 30 minutes  transitional movements getting in  bed and car and getting out of  bed and car  walking for longer than 30 minutes  carrying laundry/groceries    Patient reports benefit from:  movement or exercise , anti-inflammatory, cold - 3x/day    Social information:   Living Situation:single family home   Equipment Available: None and she did use a walker or cane       Objective:      Note: Items left blank indicates the item was not performed or not indicated at the time of the evaluation.    Patient Outcome Measures :    Lower Extremity Functional Scale  (_/80): 71   Scores range from 0-80, where a score of 80 represents maximum function. The minimal clinically important difference is a positive change of 9 points.    Knee Examination  1. Left medial knee pain     2. Abnormality of gait       Involved Side: Left  Posture Observation:      General sitting posture is  normal.  General standing posture is normal.  Assistive Device: SEC and Walker  Gait Observation: limp on L prior to treatment - decreased after treatment today  Lumbar Clearing: Does not provoke symptoms  Hip Clearing: Does not provoke symptoms      Knee ROM Within normal limits unless otherwise indicated     Date:  10/31/18    AROM in degrees  Right   Left  Right   Left  Right   Left       Knee Flexion  (130 )      110 with overpressure                   Knee Extension  (0 )      2                 PROM in degrees  Right   Left  Right   Left  Right   Left       Knee Flexion  (130 )                         Knee Extension  (0 )                       LE Strength    Within normal limits unless otherwise indicated               Date:  10/31/18   Strength (MMT/5)  Right   Left  Right   Left  Right   Left       Hip Flexion                         Hip Abduction                         Hip Adduction                         Hip Extension                         Hip Internal Rotation                         Hip External Rotation                         Knee Extension                         Knee Flexion                         Ankle Dorsiflexion                         Ankle Plantarflexion                       Flexibility:  Decreased of HF, hamstrings, gastroc    Palpation:  TTP of posterior > anterior knee joint    Knee Special Tests (+/-):      Knee OA Cluster   Right   Left   Ligament Tests   Right   Left    1. > 51 y/o      +     Lachman          2. Stiffness > 30 min.      +     Anterior Drawer          3. Crepitus           Posterior Drawer          4. Bony tenderness      +     Posterior Sag          5.  Bone enlargement           Valgus Stress          6. No warmth to the touch           Varus Stress           Meniscal Tests   Right   Left    Other   Right    Left       Jasmine's           Ely's             Joint line tenderness           Tin             Thessaly Thomas Apley's                        Treatment Today       Patient Education: Patient educated on plan of care, prognosis, PT/patient role and HEP. Patient educated on impairments related to condition and reproduction of symptoms. Patient instructed to focus on the small goals and this may be a long process to recovery, and that exercises at home are just as important as coming to therapy. Patient was educated on importance of exercise consistency and activity modification in order to see progress and change. Patient demonstrated and verbalized understanding.     Manual Therapy:  MFR to medial and posterior knee joint - very inflamed with TTP that decreased with treatment - patient was able to ambulate more appropriately with less pain after treatment    Exercises:  Exercise #1: supine quad sets - hold 5 sec x 10  Comment #1: seated hamstring stretch at edge of bed - hold 30 sec x 2  Exercise #2: standing gastroc stretch at wall - hold 30 sec x 2  Comment #2: already doing SLR and sidelying hip abduction and standing hip abduction as HEP        TREATMENT MINUTES COMMENTS   Evaluation 15    Self-care/ Home management     Manual therapy 15 MFR to medial and posterior knee joint - very inflamed with TTP that decreased with treatment - patient was able to ambulate more appropriately with less pain after treatment   Neuromuscular Re-education     Therapeutic Activity     Therapeutic Exercises 10 See Flowsheet   Gait training     Modality__________________                Total 40    Blank areas are intentional and mean the treatment did not include these items.     PT Evaluation Code: (Please list factors)  Patient History/Comorbidities:  chronic knee pain  Examination: decreased knee mobility and flexibility with impaired gait  Clinical Presentation: stable  Clinical Decision Making: low    Patient History/  Comorbidities Examination  (body structures and functions, activity limitations, and/or participation restrictions) Clinical Presentation Clinical Decision Making (Complexity)   No documented Comorbidities or personal factors 1-2 Elements Stable and/or uncomplicated Low   1-2 documented comorbidities or personal factor 3 Elements Evolving clinical presentation with changing characteristics Moderate   3-4 documented comorbidities or personal factors 4 or more Unstable and unpredictable High                Muriel Forman, PT  10/31/2018  7:56 AM

## 2021-06-24 NOTE — TELEPHONE ENCOUNTER
Refill Approved    Rx renewed per Medication Renewal Policy. Medication was last renewed on 9/15/18.    Sharita Sanchez, [facility] Triage/Med Refill 2/28/2019     Requested Prescriptions   Pending Prescriptions Disp Refills     amLODIPine (NORVASC) 10 MG tablet [Pharmacy Med Name: AMLODIPINE 10MG TAB] 90 tablet 1     Sig: TAKE 1 TABLET BY MOUTH ONCE DAILY    Calcium-Channel Blockers Protocol Passed - 2/28/2019 11:49 AM       Passed - PCP or prescribing provider visit in past 12 months or next 3 months    Last office visit with prescriber/PCP: 10/16/2018 Abdulkadir Rodriguez MD OR same dept: 10/22/2018 Eda Valencia MD OR same specialty: 10/22/2018 Eda Valencia MD  Last physical: Visit date not found Last MTM visit: Visit date not found   Next visit within 3 mo: Visit date not found  Next physical within 3 mo: Visit date not found  Prescriber OR PCP: Abdulkadir Rodriguez MD  Last diagnosis associated with med order: 1. Essential hypertension  - amLODIPine (NORVASC) 10 MG tablet [Pharmacy Med Name: AMLODIPINE 10MG TAB]; TAKE 1 TABLET BY MOUTH ONCE DAILY  Dispense: 90 tablet; Refill: 1    If protocol passes may refill for 12 months if within 3 months of last provider visit (or a total of 15 months).            Passed - Blood pressure filed in past 12 months    BP Readings from Last 1 Encounters:   10/22/18 124/70

## 2021-06-25 NOTE — PROGRESS NOTES
Assessment/ Plan  Problem List Items Addressed This Visit        High    Essential hypertension - Primary     Blood pressure well controlled on amlodipine 10, hydrochlorothiazide 25 and losartan 100.  No changes, check BMP and hemoglobin today.         Relevant Orders    Basic Metabolic Panel    Hemoglobin       Unprioritized    Arthritis of carpometacarpal (CMC) joint of left thumb     Wrist pain seems more localized now to this joint.  Has improved somewhat over the last visit.  Recommend ongoing use of acetaminophen, could add diclofenac gel.  Patient has wrist splint but probably not as effective as thumb spica splint would be.  Offered this but declined at this point.  Recommend relative rest, agree with idea of ice.  No further intervention planned at this point.             Subjective  CC:  Chief Complaint   Patient presents with     Follow Up     HPI:  Left wrist feels significantly better than last time, pain has decreased.  Has been wearing splint but often gets in the way so she takes it off.  Still has some significant pain when she is doing things like trying to get on her socks or shaking out laundry.  Has not been able to use this hand to do these things.  There is a little lump over the volar surface of the wrist which looks like a vein.    No other questions today.  Still continues to struggle with mood, lonely at home.  Fortunately has Dina the cat to talk to.  PFSH:  Patient Active Problem List   Diagnosis     Diverticulosis     Esophageal reflux     Osteoarthritis     Hyperlipidemia     Essential hypertension     Healthcare maintenance     Pes anserine bursitis     Nodule of finger of left hand     Grief reaction     Asymptomatic menopausal state      Adjustment disorder with depressed mood     Rotator cuff tendonitis, right     Arthritis of carpometacarpal (CMC) joint of left thumb     Current medications reviewed as follows:  Current Outpatient Medications on File Prior to Visit   Medication  "Sig     acetaminophen (TYLENOL) 325 MG tablet Take 650 mg by mouth every 6 (six) hours as needed for pain.     amLODIPine (NORVASC) 10 MG tablet Take 1 tablet by mouth once daily     hydroCHLOROthiazide (HYDRODIURIL) 25 MG tablet Take 1 tablet (25 mg total) by mouth daily.     losartan (COZAAR) 100 MG tablet Take 1 tablet (100 mg total) by mouth daily.     No current facility-administered medications on file prior to visit.      Social History     Tobacco Use   Smoking Status Never Smoker   Smokeless Tobacco Never Used     Social History     Social History Narrative    Patient is  to Dom    Moved to [facility] in 2016     Patient Care Team:  Abdulkadir Rodriguez MD as PCP - General  Abdulkadir Rodriguez MD as Assigned PCP  Ugo Garcia LGSW as Lead Care Coordinator (Primary Care - CC)  Brian Bojorquez CHW as Community Health Worker (Primary Care - CC)  Rosa Stafford LICSW as   Rosa Stafford LICSW as   ROS  Negative except above      Objective  Physical Exam  Vitals:    05/26/21 1032   BP: 116/65   Patient Site: Left Arm   Patient Position: Sitting   Cuff Size: Adult Regular   Pulse: 71   Resp: 18   Weight: 151 lb (68.5 kg)     Body mass index is 31.56 kg/m .    Pain on palpation of carpometacarpal joint, particularly overlying volar surface.  No swelling or irregularity noted, negative \"grind test\"  Negative Finkelstein  Please note: Voice recognition software was used in this dictation.  It may therefore contain typographical errors.      "

## 2021-06-25 NOTE — PROGRESS NOTES
5/26/2021  Clinic Care Coordination Contact  Care Team Conversations     Received staff message from   From: Rosa Stafford LICSW  Sent: 5/26/2021   9:32 AM CDT  To: Abdulkadir Rodriguez MD, Brian Bojorquez, CHW  Subject: Medical transportation and [employer]       Good morning team,   1. I just had a phone call visit with Laine. She is preparing for coming to the clinic. Laine was hoping to start her grief support near her home soon. However, she was told to wait until November when the next round starts. This is too long for her. However, we figured that she can use [employer] to get around.  I explained her how it works. She is okay to do that. I could help with the application but it would take sometime as we won' t see each other in person.   So I have asked her to communicate with you about the [employer] application. She said she will.     2. Laine never used medical transportation before. Today was too late to try it. I asked her to ask you to show her the number to call to set up her ride next time when she needs to come to see Doctor Rodriguez or any other medical providers.     Please assist with these two things. Thank you  Rosa      CHW Follow up: Monthly    CHW Plan: Follow up on goals    CHW Next Follow Up: 6-4-21

## 2021-06-25 NOTE — PROGRESS NOTES
".  Mental Health Psychotherapy Telephone Note    Patient: Laine Sharma    : 1933 MRN: [id number]    Completed a 2 point identification verification: patient verbalized full name and date of birth.     Date:      Start time: 9:10  Stop Time: 9:32  Session # 13    The patient has been notified of the following:   \"We have found that certain health care needs can be provided without the need for a face to face visit.  This service lets us provide the care you need with a phone conversation.  I will have full access to your [facility] medical record during this entire phone call.   I will be taking notes for your medical record. Since this is like an office visit, we will bill your insurance company for this service.  There are potential benefits and risks of telephone visits (e.g. limits to patient confidentiality) that differ from in-person visits.?  Confidentiality still applies for telephone services, and nobody will record the visit.  It is important to be in a quiet, private space that is free of distractions (including cell phone or other devices) during the visit.?? If during the course of the call I believe a telephone visit is not appropriate, you will not be charged for this service\"  Consent has been obtained for this service by care team member: Yes, per verbal agreement   Session Type: Patient is participating in a telemedicine phone visit : No device for video    Those present for this session: Patient and therapist    Chief Complaint   Patient presents with      Follow Up     Telephone visit for psychotherapy     New symptoms or complaints:Grief, family issues; Transportation issue    Functional Impairment:   Personal: 3  Family: 3  Work: 0  Social:3        ASSESSMENT: Current Emotional / Mental Status (status of significant symptoms):              Risk status (Self / Other harm or suicidal ideation)              Patient denies safety issues              Patient denies current or recent " suicidal ideation or behaviors.              Patient denies current or recent homicidal ideation or behaviors.              Patient denies current or recent self injurious behavior or ideation.              Patient denies other safety concerns.              Patient denies change in risk factors              Patient denies change in protective factors              Recommended that patient call 911 or go to the local ED should there be a change in any of these risk factors.                Attitude:                                   Cooperative               Orientation:                             3X              Speech                          Rate / Production:       Normal/ Responsive Normal                           Volume:                       Normal               Mood:                                      Normal              Thought Content:                    Clear               Thought Form:                        Coherent  Logical               Insight:                                     Good     Patient's impression of their current status:Rashard reported no change the last visit. Still is struggling with her loss. Called to check the status of group support in [facility]. She was told the group has now started and has only 2 sessions left. She was asked to wait until Fall. This is too far. So patient agreed she can go to [place] as long as she has a transportation. She agreed to have [employer] application done for her. Her finger still causes he issue. She is also willing to use medical rides when she has to see her medical providers. Patient agreed to address these issues at her visit with Dr. Rodriguez this morning. Patient reported no other concerns today. Her next visit is in 2 weeks.     Therapist impression of patients current state: Writer assessed patient's safety. Invited her to problem solving regarding group support access. Patient is determined to feel better. She will need support to use  "the resources in place to access her care including grief support or even visiting her friends/family around the [facility] area.  This will help with coping with her loss and finding healthy options to cope. Active listening, empathy, validation of her feelings and invitation to problem solving were use. She sounded optimistic and encouraged as also she has shared.     Type of psychotherapeutic technique provided: Insight oriented, Client centered, Solution-focused, CBT and Grief processing    Progress toward short term goals: Progress as expected, Pt discussing concerns and coping strategies during tele-sessions. Pt working on homework assignments and skills learned in therapy between sessions    Review of long term goals: Treatment plan updated:5/12/2021, next review: 8/11/2021    Diagnosis:  1. Grief reaction    2. Adjustment disorder with anxiety      Plan and Follow up: Patient will keep these goals:  Patient will keep up with her social and family connections.   Patient will start her new project using gin and knitting  Patient will spend some time with her best friend near by this weekend  Patient will continue to take some walks as weather permits  Patient will use her note book to have friends write good words about   Patient plan to buy  \"TheBook of  Forgiving\" by Jose Conte and Joeo  Patient will address the issue with transportation to her PCP and team for assistance  Patient's next tel visit is in 2 weeks.     Discharge Criteria/Planning: Patient will continue with follow-up until therapy can be discontinued without return of signs and symptoms.    I have reviewed the note as documented above.  This accurately captures the substance of my conversation with the patient.  As the provider I attest to compliance with applicable laws and regulations related to telemedicine.  Performed and documented by CHASITY Newell- JOAQUIM; [facility] 5/26/2021  "

## 2021-06-26 NOTE — PROGRESS NOTES
6/4/2021  Clinic Care Coordination Contact    Community Health Worker Follow Up    Goals:   Goals Addressed                 This Visit's Progress       Patient Stated      Mental Health Management (pt-stated)        Goal Statement: I want to be aware of support groups in my area (grief,  recently in memory care) within 1-2 months  Date Goal set: 09/08/20  Barriers: Unsure of resources  Strengths: Children support  Date to Achieve By: 11/08/2020  Patient expressed understanding of goal: yes  Action steps to achieve this goal:  1. I continue to  talk with therapist for support every 2 weeks and will ask the therapist if she knows of any virtual senior support group.  2. I will continue to connect with family members on a regular bases for support.  3. I will look for other support groups close to home   4. I will call Andie Paredes Volunteer Facilitator [phone] to register for the fall group in November.  5. I will update [facility] team at outreach.    Resources Grief Support Group  [email or web address].griefshare.org  [facility] group  Mondays at 6:30 pm   Andie Paredes Volunteer facilitator  [phone]  [place] for this group   Next meeting   Monday, May 03, 2021   6:30 pm - 8:30 pm View meeting schedule   Group location  [address] [address]       Updated: 6-4-21 AL        Other (pt-stated)        Goal Statement: I would like support to apply for [employer] within in the next 2 months  Date Goal set: 6/4/2021  Barriers: limited transportation, not able to drive  Strengths: support from family member (Ismael)  Date to Achieve By: 8-4-21  Patient expressed understanding of goal: Yes  Action steps to achieve this goal:  1. I will wait to get the [employer] application in the mail from [facility].  2. I will ask Ismael to help fill out the application.  3. I will drop off the application to Dr Rodriguez to fill out the 2nd  part of the application.  Application needs to be mailed to [employer] office.                Called and spoke to patient and follow up on goals.  Patient reported:  -group closed not accepting at this time until the fall in November. Stated to call back to register in the fall.  -would like to access [employer].  Patient will have Ismael family member to help fill out the application.    Sent message to  SW will mail the [employer] application.    CHW will research other support groups in [facility] and mail information.      CHW Follow up: Monthly    CHW Plan: Follow up on goals    CHW Next Follow Up: 7-12-21

## 2021-06-26 NOTE — PROGRESS NOTES
Clinic Care Coordination Contact    Situation: Patient chart reviewed by care coordinator.    Background: SW completed chart review    Assessment: Patient is continuing to work with her therapist. Unable to start support group. Going to apply for [employer]    Plan/Recommendations: Standard Outreach

## 2021-06-26 NOTE — PROGRESS NOTES
".  Mental Health Psychotherapy Telephone Note    Patient: Laine Sharma    : 1933 MRN: [id number]    Completed a 2 point identification verification: patient verbalized full name and date of birth.     Date: 2021     Start time: 9:04  Stop Time: 9:48  Session # 14    The patient has been notified of the following:   \"We have found that certain health care needs can be provided without the need for a face to face visit.  This service lets us provide the care you need with a phone conversation.  I will have full access to your [facility] medical record during this entire phone call.   I will be taking notes for your medical record. Since this is like an office visit, we will bill your insurance company for this service.  There are potential benefits and risks of telephone visits (e.g. limits to patient confidentiality) that differ from in-person visits.?  Confidentiality still applies for telephone services, and nobody will record the visit.  It is important to be in a quiet, private space that is free of distractions (including cell phone or other devices) during the visit.?? If during the course of the call I believe a telephone visit is not appropriate, you will not be charged for this service\"  Consent has been obtained for this service by care team member: Yes, per verbal agreement   Session Type: Patient is participating in a telemedicine phone visit : No device for video    Those present for this session: Patient and therapist    Chief Complaint   Patient presents with     MH Follow Up     Telephone visit for psychotherapy     New symptoms or complaints:ongoing Grief, family issues; Transportation issue  PHQ9:0  GAG7:0    Functional Impairment:   Personal: 3  Family: 3  Work: 0  Social:3        ASSESSMENT: Current Emotional / Mental Status (status of significant symptoms):              Risk status (Self / Other harm or suicidal ideation)              Patient denies safety issues              Patient " denies current or recent suicidal ideation or behaviors.              Patient denies current or recent homicidal ideation or behaviors.              Patient denies current or recent self injurious behavior or ideation.              Patient denies other safety concerns.              Patient denies change in risk factors              Patient denies change in protective factors              Recommended that patient call 911 or go to the local ED should there be a change in any of these risk factors.                Attitude:                                   Cooperative               Orientation:                             3X              Speech                          Rate / Production:       Normal/ Responsive Normal                           Volume:                       Normal               Mood:                                      Normal              Thought Content:                    Clear               Thought Form:                        Coherent  Logical               Insight:                                     Good     Patient's impression of their current status:Rashard reported no change since  the last visit. Still is struggling with her loss. Though continue to increase the level of acceptance and understanding that it takes some time to find internal peace. Has been doing some reading which she notes helps to keep her mind busy. Still notes some issues in the family. Notes she needs to let go certain things that she notes are bothering her. Agreed to focus more on the positive side, good memories with her brother and avoid negative interactions. Patient still does not have proper transportation. She is yet to acces to [employer] and connect with her insurance about medical transportation. This need was addressed and she will be assisted with the [employer] application by a care coordinator. Her thumb still bothers her. She will consult with her PCP about the next level of care. Patient  "reported no other concerns today. Her next visit is in 2 weeks.     Therapist impression of patients current state: Writer assessed patient's safety. Invited her to problem solving regarding group support access. Writer checked with the care coordinator about patient's application for [employer]. Patient will need transportation to access support group for her grief. She would be better off joining a group to continue processing her loss and get some support.  Active listening, empathy, validation of her feelings and invitation to problem solving were use. She continues to be optimistic and encouraged as also she has shared.     Type of psychotherapeutic technique provided: Insight oriented, Client centered, Solution-focused, CBT and Grief processing    Progress toward short term goals: Progress as expected, Pt discussing concerns and coping strategies during tele-sessions. Pt working on homework assignments and skills learned in therapy between sessions    Review of long term goals: Treatment plan updated:5/12/2021, next review: 8/11/2021    Diagnosis:  1. Grief reaction    2. Adjustment disorder with anxiety      Plan and Follow up:   I've re-evaluated this with the patient and no changes were made today.   Patient will keep up with her social and family connections.   Patient will start her new project using gin and knitting  Patient will spend some time with her best friend near by this weekend  Patient will continue to take some walks as weather permits  Patient will use her note book to have friends write good words about   Patient plan to buy  \"TheBook of  Forgiving\" by Jose Conte and Joeo  Patient will check with the care coordinator the form for [employer] application  Patient's next tel visit is in 2 weeks.     Discharge Criteria/Planning: Patient will continue with follow-up until therapy can be discontinued without return of signs and symptoms.    I have reviewed the note as " documented above.  This accurately captures the substance of my conversation with the patient.  As the provider I attest to compliance with applicable laws and regulations related to telemedicine.  Performed and documented by CHASITY Newell- JOAQUIM; [facility] [place] 6/11/2021

## 2021-06-29 NOTE — PROGRESS NOTES
Progress Notes by Ugo Garcia LGSW at 9/8/2020  9:19 AM     Author: Ugo Garcia LGSW Service: -- Author Type:     Filed: 9/8/2020  9:29 AM Encounter Date: 9/8/2020 Status: Signed    : Ugo Garcia LGSW ()       Clinic Care Coordination Contact  CCC team was working with Laine's . SW closed his enrollment and enrolled Laine due to him being in memory care now.       Clinic Care Coordination Contact  OUTREACH    Referral Information:  Referral Source: Care Team    Primary Diagnosis: Psychosocial    Chief Complaint   Patient presents with   ? Clinic Care Coordination - Initial        Universal Utilization:   Clinic Utilization  Difficulty keeping appointments:: No  Compliance Concerns: No  No-Show Concerns: No  No PCP office visit in Past Year: No  Utilization    Last refreshed: 9/7/2020  7:49 PM:  Hospital Admissions 0           Last refreshed: 9/7/2020  7:49 PM:  ED Visits 0           Last refreshed: 9/7/2020  7:49 PM:  No Show Count (past year) 0              Current as of: 9/7/2020  7:49 PM              Clinical Concerns:  Current Medical Concerns:  None reported    Current Behavioral Concerns: Grief due to  moving out to memory care    Education Provided to patient: Role of CCC      Health Maintenance Reviewed: Not assessed  Clinical Pathway: None     Medication Management:  No concerns reported     Functional Status:  Dependent ADLs:: Independent  Dependent IADLs:: Independent  Bed or wheelchair confined:: No  Mobility Status: Independent  Fallen 2 or more times in the past year?: No  Any fall with injury in the past year?: No    Living Situation:  Current living arrangement:: I live alone  Type of residence:: Private home - no stairs    Lifestyle & Psychosocial Needs:        Diet:: Regular  Inadequate nutrition (GOAL):: No  Tube Feeding: No  Inadequate activity/exercise (GOAL):: No  Significant changes in sleep pattern (GOAL):  Yes  Transportation means:: Regular car     Anglican or spiritual beliefs that impact treatment:: No  Mental health DX:: No  Mental health management concern (GOAL):: Yes  Informal Support system:: Children, [place] based   Socioeconomic History   ? Marital status:      Spouse name: Not on file   ? Number of children: Not on file   ? Years of education: Not on file   ? Highest education level: Not on file     Tobacco Use   ? Smoking status: Never Smoker   ? Smokeless tobacco: Never Used   Substance and Sexual Activity   ? Alcohol use: No           will mail Laine support resources. She is interested in groups if they are open for attending.       Resources and Interventions:  Current Resources:                 Advance Care Plan/Directive  Advanced Care Plans/Directives on file:: No  Advanced Care Plan/Directive Status: Not Applicable          Goals:   Goals        General    Mental Health Management (pt-stated)     Notes - Note created  9/8/2020  9:22 AM by Ugo Garcia, KRISTOFER    Goal Statement: I want to be aware of support groups in my area (grief,  recently in memory care) within 1-2 months  Date Goal set: 09/08/20  Barriers: Unsure of resources  Strengths: Children support  Date to Achieve By: 11/08/2020  Patient expressed understanding of goal: yes  Action steps to achieve this goal:  1. I will review resources from   2. I will update CCC team                Patient/Caregiver understanding: Reported Understanding        Future Appointments              In 2 weeks Abdulkadir Rodriguez MD [facility]          Plan: Standard Outreach

## 2021-06-30 NOTE — PROGRESS NOTES
Progress Notes by Brian Bojorquez CHW at 4/27/2021  2:31 PM     Author: Brian Bojorquez CHW Service: -- Author Type: Community Health Worker    Filed: 4/27/2021  2:40 PM Encounter Date: 4/27/2021 Status: Signed    : Brian Bojorquez CHW (Community Health Worker)       4/27/2021  Clinic Care Coordination Contact  [facility]/Voicemail       Clinical Data: Care Coordinator Outreach  Outreach attempted x 1.  Left message on patient's voicemail with call back information and requested return call.  Plan: Care Coordinatorwill try to reach patient again in 10 business days.      CHW Follow up: 5-4-21     Resources Grief Support Group  https://www.griefshare.org  [facility] group  Mondays at 6:30 pm   Antione Ragsdale facilitator  [phone]  [place] for this group   Next meeting   Monday, May 03, 2021 ? 6:30 pm - 8:30 pm View meeting schedule   [place] [address] ?                     Map data  2021 [employer]   Terms of Use   Report a map error     The GriefShare experience  Watch this video to learn what its like to join a GriefShare group.  Group details     Antione Ragsdale facilitator  [phone] Send message     Cost   $15.00 (Includes workbook)  Scholarships available

## 2021-07-03 NOTE — ADDENDUM NOTE
Addendum Note by Abdulkadir Rodriguez MD at 11/6/2018  3:19 PM     Author: Abdulkadir Rodriguez MD Service: -- Author Type: Physician    Filed: 11/6/2018  3:19 PM Encounter Date: 10/16/2018 Status: Signed    : Abdulkadir Rodriguez MD (Physician)    Addended by: ABDULKADIR RODRIGUEZ on: 11/6/2018 03:19 PM        Modules accepted: Orders

## 2021-07-03 NOTE — ADDENDUM NOTE
Addendum Note by Saulo Zamora CMA at 11/6/2018  2:58 PM     Author: Saulo Zamora CMA Service: -- Author Type: Medical Assistant    Filed: 11/6/2018  2:58 PM Encounter Date: 10/16/2018 Status: Signed    : Saulo Zamora CMA (Medical Assistant)    Addended by: SAULO ZAMORA on: 11/6/2018 02:58 PM        Modules accepted: Orders         retired

## 2021-07-03 NOTE — ADDENDUM NOTE
Addendum Note by Ly Tomlinson DO at 12/2/2019  8:47 AM     Author: Ly Tomlinson DO Service: -- Author Type: Physician    Filed: 12/2/2019  8:47 AM Encounter Date: 11/29/2019 Status: Signed    : Ly Tomlinson DO (Physician)    Addended by: LY TOMLINSON on: 12/2/2019 08:47 AM        Modules accepted: Orders

## 2021-07-03 NOTE — ADDENDUM NOTE
Addendum Note by Abdulkadir Rodriguez MD at 10/12/2018  3:50 PM     Author: Abdulkadir Rodriguez MD Service: -- Author Type: Physician    Filed: 10/12/2018  3:50 PM Encounter Date: 10/2/2018 Status: Signed    : Abdulkadir Rodriguez MD (Physician)    Addended by: ABDULKADIR RODRIGUEZ on: 10/12/2018 03:50 PM        Modules accepted: Orders

## 2021-07-03 NOTE — ADDENDUM NOTE
Addendum Note by Kimi Larry CMA at 10/10/2018 11:25 AM     Author: Kimi Larry CMA Service: -- Author Type: Certified Medical Assistant    Filed: 10/10/2018 11:25 AM Encounter Date: 10/9/2018 Status: Signed    : Kimi Larry CMA (Certified Medical Assistant)    Addended by: KIMI LARRY on: 10/10/2018 11:25 AM        Modules accepted: Orders

## 2021-07-03 NOTE — ADDENDUM NOTE
Addendum Note by Kimi Larry CMA at 10/10/2018 11:29 AM     Author: Kimi Larry CMA Service: -- Author Type: Certified Medical Assistant    Filed: 10/10/2018 11:29 AM Encounter Date: 10/2/2018 Status: Signed    : Kimi Larry CMA (Certified Medical Assistant)    Addended by: KIMI LARRY on: 10/10/2018 11:29 AM        Modules accepted: Orders

## 2021-07-04 NOTE — LETTER
Letter by Abdulkadir Rodriguez MD at      Author: Abdulkadir Rodriguez MD Service: -- Author Type: --    Filed:  Encounter Date: 5/27/2021 Status: (Other)         Laine Sharma  [address]  [place] [address]             May 27, 2021         Dear Ms. Sharma,    Below are the results from your recent visit:    Resulted Orders   Basic Metabolic Panel   Result Value Ref Range    Sodium 140 136 - 145 mmol/L    Potassium 3.9 3.5 - 5.0 mmol/L    Chloride 104 98 - 107 mmol/L    CO2 24 22 - 31 mmol/L    Anion Gap, Calculation 12 5 - 18 mmol/L    Glucose 99 70 - 125 mg/dL    Calcium 8.8 8.5 - 10.5 mg/dL    BUN 22 8 - 28 mg/dL    Creatinine 0.94 0.60 - 1.10 mg/dL    GFR MDRD Af Amer >60 >60 mL/min/1.73m2    GFR MDRD Non Af Amer 56 (L) >60 mL/min/1.73m2    Narrative    Fasting Glucose reference range is 70-99 mg/dL per  American Diabetes Association (ADA) guidelines.   Hemoglobin   Result Value Ref Range    Hemoglobin 11.8 (L) 12.0 - 16.0 g/dL       Laine, the results of your recent blood test are normal.       Please call with questions or contact us using [facility].    Sincerely,        Electronically signed by Abdulkadir Rodriguez MD

## 2021-07-06 VITALS
WEIGHT: 151 LBS | BODY MASS INDEX: 31.56 KG/M2 | RESPIRATION RATE: 18 BRPM | SYSTOLIC BLOOD PRESSURE: 116 MMHG | HEART RATE: 71 BPM | DIASTOLIC BLOOD PRESSURE: 65 MMHG

## 2021-07-06 ASSESSMENT — PATIENT HEALTH QUESTIONNAIRE - PHQ9
SUM OF ALL RESPONSES TO PHQ QUESTIONS 1-9: 0
SUM OF ALL RESPONSES TO PHQ QUESTIONS 1-9: 0

## 2021-07-08 ASSESSMENT — ANXIETY QUESTIONNAIRES
GAD7 TOTAL SCORE: 0
GAD7 TOTAL SCORE: 0

## 2021-07-20 ENCOUNTER — PATIENT OUTREACH (OUTPATIENT)
Dept: CARE COORDINATION | Facility: CLINIC | Age: 86
End: 2021-07-20

## 2021-07-20 NOTE — PROGRESS NOTES
7/20/2021  Clinic Care Coordination Contact  Community Health Worker Follow Up  [facility]/Voicemail      Clinical Data: Care Coordinator Outreach  Outreach attempted x 1.  Left message on patient's voicemail with call back information and requested return call.  Plan: Care Coordinator  will try to reach patient again in 10 business days.      CHW Follow up: 8-4-21

## 2021-07-21 ENCOUNTER — OFFICE VISIT (OUTPATIENT)
Dept: FAMILY MEDICINE | Facility: CLINIC | Age: 86
End: 2021-07-21
Payer: COMMERCIAL

## 2021-07-21 ENCOUNTER — PATIENT OUTREACH (OUTPATIENT)
Dept: CARE COORDINATION | Facility: CLINIC | Age: 86
End: 2021-07-21

## 2021-07-21 VITALS
SYSTOLIC BLOOD PRESSURE: 117 MMHG | DIASTOLIC BLOOD PRESSURE: 70 MMHG | BODY MASS INDEX: 31.35 KG/M2 | WEIGHT: 150 LBS | HEART RATE: 69 BPM

## 2021-07-21 DIAGNOSIS — M65.30 TRIGGER FINGER, ACQUIRED: Primary | ICD-10-CM

## 2021-07-21 PROCEDURE — 20550 NJX 1 TENDON SHEATH/LIGAMENT: CPT | Performed by: FAMILY MEDICINE

## 2021-07-21 RX ORDER — METHYLPREDNISOLONE ACETATE 40 MG/ML
20 INJECTION, SUSPENSION INTRA-ARTICULAR; INTRALESIONAL; INTRAMUSCULAR; SOFT TISSUE ONCE
Status: COMPLETED | OUTPATIENT
Start: 2021-07-21 | End: 2021-07-21

## 2021-07-21 RX ORDER — METHYLPREDNISOLONE ACETATE 40 MG/ML
20 INJECTION, SUSPENSION INTRA-ARTICULAR; INTRALESIONAL; INTRAMUSCULAR; SOFT TISSUE ONCE
Qty: 0.5 ML | Refills: 0 | OUTPATIENT
Start: 2021-07-21 | End: 2021-07-21

## 2021-07-21 RX ORDER — METHYLPREDNISOLONE SODIUM SUCCINATE 40 MG/ML
20 INJECTION, POWDER, LYOPHILIZED, FOR SOLUTION INTRAMUSCULAR; INTRAVENOUS ONCE
Status: DISCONTINUED | OUTPATIENT
Start: 2021-07-21 | End: 2021-07-21

## 2021-07-21 RX ADMIN — METHYLPREDNISOLONE ACETATE 20 MG: 40 INJECTION, SUSPENSION INTRA-ARTICULAR; INTRALESIONAL; INTRAMUSCULAR; SOFT TISSUE at 13:32

## 2021-07-21 NOTE — PROGRESS NOTES
Clinic Care Coordination Contact    Situation: Patient chart reviewed by carecoordinator.    Background: SW completed chart review    Assessment: Patient has been unable to be reached by CHW    Plan/Recommendations: Standard Outreach

## 2021-07-21 NOTE — PROGRESS NOTES
Please here with ongoing left arm pain.  This is been present for more than 6 months, hurts when she moves it, no snapping or popping, no swelling, no history of injury.  Seen 5/26/2021, discussed, recommended thumb spica splint.  My suspicion at that time was that this was osteoarthritis of the MCP joint.    On exam today however, she does have a ball on the tendon that is quite tender when palpated.  There is not tenderness over the joint on palpation.  There is not any significant tenderness with axial pressure.      Based on that I felt that this was possibly a trigger finger and suggested we do a trial of injection.  She agreed.    Discussed risk of infection, rupture of the tendon. Also that this injection hurts.  Agreeable. 1/2 mL of lido  without epinephrine, 1/2 mL o fDepomedrol 40 injected along the tendon sheath  Patient tolerated this well.  Advised follow-up in 2 weeks if no improvement, we could repeat the injection. If no improvement after this, we would refer to hand surgery

## 2021-07-22 ENCOUNTER — VIRTUAL VISIT (OUTPATIENT)
Dept: BEHAVIORAL HEALTH | Facility: HOSPITAL | Age: 86
End: 2021-07-22
Payer: COMMERCIAL

## 2021-07-22 DIAGNOSIS — F43.21 GRIEF REACTION: Primary | ICD-10-CM

## 2021-07-22 DIAGNOSIS — F43.22 ADJUSTMENT DISORDER WITH ANXIETY: ICD-10-CM

## 2021-07-22 PROCEDURE — 90834 PSYTX W PT 45 MINUTES: CPT | Mod: TEL | Performed by: SOCIAL WORKER

## 2021-07-22 ASSESSMENT — PATIENT HEALTH QUESTIONNAIRE - PHQ9
SUM OF ALL RESPONSES TO PHQ QUESTIONS 1-9: 0
5. POOR APPETITE OR OVEREATING: NOT AT ALL

## 2021-07-22 ASSESSMENT — ANXIETY QUESTIONNAIRES
IF YOU CHECKED OFF ANY PROBLEMS ON THIS QUESTIONNAIRE, HOW DIFFICULT HAVE THESE PROBLEMS MADE IT FOR YOU TO DO YOUR WORK, TAKE CARE OF THINGS AT HOME, OR GET ALONG WITH OTHER PEOPLE: NOT DIFFICULT AT ALL
5. BEING SO RESTLESS THAT IT IS HARD TO SIT STILL: NOT AT ALL
2. NOT BEING ABLE TO STOP OR CONTROL WORRYING: NOT AT ALL
3. WORRYING TOO MUCH ABOUT DIFFERENT THINGS: NOT AT ALL
6. BECOMING EASILY ANNOYED OR IRRITABLE: NOT AT ALL
GAD7 TOTAL SCORE: 0
1. FEELING NERVOUS, ANXIOUS, OR ON EDGE: NOT AT ALL
7. FEELING AFRAID AS IF SOMETHING AWFUL MIGHT HAPPEN: NOT AT ALL

## 2021-07-22 NOTE — PROGRESS NOTES
"                                             Progress Note    Patient Name: Laine Sharma  Date: 7/22/2021         Service Type: Individual      Session Start Time: 9:05  Session End Time: 9:52     Session Length:48 min    Session #: 17    Attendees: Client    Service Modality:  Phone Visit:[phone]      Provider verified identity through the following two step process.  Patient provided:  Patient address and Patient is known previously to provider    The patient has been notified of the following:      \"We have found that certain health care needs can be provided without the need for a face to face visit.  This service lets us provide the care you need with a phone conversation.       I will have full access to your [facility] medical record during this entire phone call.   I will be taking notes for your medical record.      Since this is like an office visit, we will bill your insurance company for this service.       There are potential benefits and risks of telephone visits (e.g. limits to patient confidentiality) that differ from in-person visits.?Confidentiality still applies for telephone services, and nobody will record the visit.  It is important to be in a quiet, private space that is free of distractions (including cell phone or other devices) during the visit.??      If during the course of the call I believe a telephone visit is not appropriate, you will not be charged for this service\"     Consent has been obtained for this service by care team member: Yes      Treatment Plan Last Reviewed: 5/12/2021, next review: 8/11/2021     PHQ-9 / CHAU-7 : 0/0    DATA  Interactive Complexity: No  Crisis: No       Progress Since Last Session (Related to Symptoms / Goals / Homework):   Symptoms: Improving : Continue to improve wtih her grief process    Homework: Partially completed. Continues to express sadness related to 's death. Some family issues.       Episode of Care Goals: Satisfactory " progress - ACTION (Actively working towards change); Intervened by reinforcing change plan / affirming steps taken     Current / Ongoing Stressors and Concerns: Has been worried about her thumb that continue to hurt. She is being care for by her PCP with a possibility of a referral to a specialist as necessary. Continues to talk about the Cide chest and family in [place]. This time around, she has decided to keep it. Brother will take it back when she is no more. Nephew understands and supports her. [place] is forcing her father to take the cider chest from the patient. No open support group yet. Patient was told to wait until the Fall time. Changed her mind about [employer] application.  Also worried about weight gain.     Treatment Objective(s) Addressed in This Session:   increase understanding of steps in the grief process. Patient need ongoing support and reminder about the steps of grief, normalizing her feelings and offering empathy.     Intervention:   Emotion Focused Therapy: review some distraction activities.         ASSESSMENT: Current Emotional / Mental Status (status of significant symptoms):   Risk status (Self / Other harm or suicidal ideation)   Patient denies current fears or concerns for personal safety.   Patient denies current or recent suicidal ideation or behaviors.   Patient denies current or recent homicidal ideation or behaviors.   Patient denies current or recent self injurious behavior or ideation.   Patient denies other safety concerns.   Patient reports there has been no change in risk factors since their last session.     Patient reports there has been no change in protective factors since their last session.     Recommended that patient call 911 or go to the local ED should there be a change in any of these risk factors.     Appearance:   Appropriate  not seen, not assessed    Eye Contact:   not seen, not assessed    Psychomotor Behavior: Normal    Attitude:   Cooperative     Orientation:   Person Place Time Situation   Speech    Rate / Production: Normal/ Responsive    Volume:  Normal    Mood:    Not assessed, not seen   Affect:    Appropriate    Thought Content:  Clear    Thought Form:  Coherent  Logical    Insight:    Good      Medication Review:   No current psychiatric medications prescribed     Medication Compliance:   Yes     Changes in Health Issues:   None reported     Chemical Use Review:   Substance Use: Chemical use reviewed, no active concerns identified      Tobacco Use: No current tobacco use.      Diagnosis:  1. Grief reaction    2. Adjustment disorder with anxiety      Collateral Reports Completed:   Not Applicable    PLAN: (Patient Tasks / Therapist Tasks / Other)  Patient talk to her friends/family members  when she feels sadfor support  Patient will update her PCP about her thumb should the pain reoccurs  Patient will keep up with her walks to distract herself and stay in shape    Speciose Sinjennifergaya, LICSW  __________________________________________________________________  Treatment Plan    Patient's Name: Laine Sharma  YOB: 1933    Date: 7/22/2021    DSM5 Diagnoses:   1. Grief reaction    2. Adjustment disorder with anxiety      Psychosocial / Contextual Factors: Lost  late last year. Pain in the  Thumb. Family issues.     WHODAS: 12    Referral / Collaboration:  No referral was needed today.     Anticipated number of session or this episode of care: 12    MeasurableTreatment Goal(s) related to diagnosis / functional impairment(s)  Goal 1: Patient will talk to her friend or sister daily for suppot    I will know I've met my goal when I don't cry every time I am alone.      Objective #A (Patient Action)    Patient will talk to at least two others about losses and coping.  Status: Restarted - Date: 7/22/2021     Intervention(s)  Therapist will assign homework : go to Buddhism with neighbor friend.    Objective #B  Patient will increase  understanding of steps in the grief process.  Status: Continued - Date(s): 7/22/2021    Intervention(s)  Therapist will teach emotional regulation skills. asisting patient to create, collect memories about .    Objective #C  Patient will identify 3 strategies to more effectively address stressors.  Status: Restarted - Date: 7/22/2021     Intervention(s)  Therapist will Continue to assist patient with looking for open grief suppot near her home. .    Patient has reviewed and agreed to the above plan.    JOAQUIM Newell  July 22, 2021

## 2021-07-23 ASSESSMENT — ANXIETY QUESTIONNAIRES: GAD7 TOTAL SCORE: 0

## 2021-08-04 ENCOUNTER — VIRTUAL VISIT (OUTPATIENT)
Dept: BEHAVIORAL HEALTH | Facility: HOSPITAL | Age: 86
End: 2021-08-04
Payer: COMMERCIAL

## 2021-08-04 DIAGNOSIS — F43.21 GRIEF REACTION: Primary | ICD-10-CM

## 2021-08-04 PROCEDURE — 90834 PSYTX W PT 45 MINUTES: CPT | Mod: TEL | Performed by: SOCIAL WORKER

## 2021-08-04 ASSESSMENT — ANXIETY QUESTIONNAIRES
GAD7 TOTAL SCORE: 1
3. WORRYING TOO MUCH ABOUT DIFFERENT THINGS: NOT AT ALL
7. FEELING AFRAID AS IF SOMETHING AWFUL MIGHT HAPPEN: NOT AT ALL
2. NOT BEING ABLE TO STOP OR CONTROL WORRYING: SEVERAL DAYS
5. BEING SO RESTLESS THAT IT IS HARD TO SIT STILL: NOT AT ALL
IF YOU CHECKED OFF ANY PROBLEMS ON THIS QUESTIONNAIRE, HOW DIFFICULT HAVE THESE PROBLEMS MADE IT FOR YOU TO DO YOUR WORK, TAKE CARE OF THINGS AT HOME, OR GET ALONG WITH OTHER PEOPLE: NOT DIFFICULT AT ALL
1. FEELING NERVOUS, ANXIOUS, OR ON EDGE: NOT AT ALL
6. BECOMING EASILY ANNOYED OR IRRITABLE: NOT AT ALL

## 2021-08-04 ASSESSMENT — PATIENT HEALTH QUESTIONNAIRE - PHQ9
5. POOR APPETITE OR OVEREATING: NOT AT ALL
SUM OF ALL RESPONSES TO PHQ QUESTIONS 1-9: 2

## 2021-08-04 NOTE — PROGRESS NOTES
"                                             Progress Note    Patient Name: Laine Sharma  Date: 8/04/2021         Service Type: Individual      Session Start Time: 12:05  Session End Time: 12:53     Session Length:48 min    Session #: 18    Attendees: Client    Service Modality:  Phone Visit:[phone]      Provider verified identity through the following two step process.  Patient provided:  Patient address and Patient is known previously to provider    The patient has been notified of the following:      \"We have found that certain health care needs can be provided without the need for a face to face visit.  This service lets us provide the care you need with a phone conversation.       I will have full access to your [facility] medical record during this entire phone call.   I will be taking notes for your medical record.      Since this is like an office visit, we will bill your insurance company for this service.       There are potential benefits and risks of telephone visits (e.g. limits to patient confidentiality) that differ from in-person visits.?Confidentiality still applies for telephone services, and nobody will record the visit.  It is important to be in a quiet, private space that is free of distractions (including cell phone or other devices) during the visit.??      If during the course of the call I believe a telephone visit is not appropriate, you will not be charged for this service\"     Consent has been obtained for this service by care team member: Yes      Treatment Plan Last Reviewed: 7/22/2021   PHQ-9 / CHAU-7 : 2/1     DATA  Interactive Complexity: No  Crisis: No       Progress Since Last Session (Related to Symptoms / Goals / Homework):   Symptoms: Reports it is difficult to calm down during certain hours like evenings and other special moments she had with .     Homework: Partially completed. Continues to express sadness related to 's death. Spent sometime with " friend. Did not go to Taoist due to covid returning at higher scale.       Episode of Care Goals: Satisfactory progress - ACTION (Actively working towards change); Intervened by reinforcing change plan / affirming steps taken     Current / Ongoing Stressors and Concerns: Missed her time for the visit. Was gone to see her neighbor.  No new issue from family but expressed ongoing sadness still.She does not wish to have medications for her emotions. She still has a hard time especially in evenings. Reports she does not eat well. Sometimes skip her meals. She is concerned about her weight. Has been taking some walks up to 45 min slowly but consistent. Does some reading and watch TV. Today she agreed to review her daily routine programming so she can occupy her evening to reduce loneliness until her bed time.  She has a follow up in coming week to determine whether a referral is still necessary for her thumb.      Treatment Objective(s) Addressed in This Session:   increase understanding of steps in the grief process. Patient needs ongoing support and reminder about the steps of grief, normalizing her feelings and offering empathy.     Intervention:  Emotion Focused Therapy: Review some distraction activities.   Solution Focused: Following were discussed with the patient as option to reduce loneliness and sadness.   1. Take bath at night versus morning 2.  Walk in the morning and evening a day for at least 30 minutes each time. 3.Do not skip meals 4. Keep your devotion time 5. Work on your puzzles for at least 45 min every day. 6.Do meditation using your 5 senses. 7. Call your friend or a family member of our choice. 8. Watch TV or listen to KTIS. Patient will make her order of these activities and agreed to put much of these activities in the evenings.      ASSESSMENT: Current Emotional / Mental Status (status of significant symptoms):   Risk status (Self / Other harm or suicidal ideation)   Patient denies current fears  or concerns for personal safety.   Patient denies current or recent suicidal ideation or behaviors.   Patient denies current or recent homicidal ideation or behaviors.   Patient denies current or recent self injurious behavior or ideation.   Patient denies other safety concerns.   Patient reports there has been no change in risk factors since their last session.     Patient reports there has been no change in protective factors since their last session.     Recommended that patient call 911 or go to the local ED should there be a change in any of these risk factors.     Appearance:   Appropriate  not seen, not assessed    Eye Contact:   not seen, not assessed    Psychomotor Behavior: Normal    Attitude:   Cooperative    Orientation:   Person Place Time Situation   Speech    Rate / Production: Normal/ Responsive    Volume:  Normal    Mood:    Not assessed, not seen   Affect:    Appropriate    Thought Content:  Clear    Thought Form:  Coherent  Logical    Insight:    Good      Medication Review:   No current psychiatric medications prescribed     Medication Compliance:   Yes     Changes in Health Issues:   None reported     Chemical Use Review:   Substance Use: Chemical use reviewed, no active concerns identified      Tobacco Use: No current tobacco use.      Diagnosis:  1. Grief reaction      Collateral Reports Completed:   Not Applicable    PLAN: (Patient Tasks / Therapist Tasks / Other)  Patient talk to her friends/family members  when she feels sad for support  Patient will update her PCP about her thumb should the pain reoccurs  Patient will keep up with her walks to distract herself and stay in shape  Patient will restructure her day using different options discussed to reduce loneliness she experiences in the evening.     JOAQUIM Newell  __________________________________________________________________  Treatment Plan    Patient's Name: Laine Sharma  YOB: 1933    Date: 7/22/2021    DSM5  Diagnoses:   1. Grief reaction    2. Adjustment disorder with anxiety      Psychosocial / Contextual Factors: Lost  late last year. Pain in the  Thumb. Family issues.     WHODAS: 12    Referral / Collaboration:  No referral was needed today.     Anticipated number of session or this episode of care: 12    MeasurableTreatment Goal(s) related to diagnosis / functional impairment(s)  Goal 1: Patient will talk to her friend or sister daily for suppot    I will know I've met my goal when I don't cry every time I am alone.      Objective #A (Patient Action)    Patient will talk to at least two others about losses and coping.  Status: Restarted - Date: 7/22/2021     Intervention(s)  Therapist will assign homework : go to Rastafarian with neighbor friend.    Objective #B  Patient will increase understanding of steps in the grief process.  Status: Continued - Date(s): 7/22/2021    Intervention(s)  Therapist will teach emotional regulation skills. asisting patient to create, collect memories about .    Objective #C  Patient will identify 3 strategies to more effectively address stressors.  Status: Restarted - Date: 7/22/2021     Intervention(s)  Therapist will Continue to assist patient with looking for open grief suppot near her home. .    Patient has reviewed and agreed to the above plan.    JOAQUIM Newell  July 22, 2021

## 2021-08-05 ASSESSMENT — ANXIETY QUESTIONNAIRES: GAD7 TOTAL SCORE: 1

## 2021-08-06 ENCOUNTER — PATIENT OUTREACH (OUTPATIENT)
Dept: CARE COORDINATION | Facility: CLINIC | Age: 86
End: 2021-08-06

## 2021-08-06 NOTE — PROGRESS NOTES
8/6/2021  Clinic Care Coordination Contact  [facility]/Voicemail       Clinical Data: Care Coordinator Outreach  Outreach attempted x 2.  Left message on patient's voicemail with call back information and requested return call.  Plan: Care Coordinator will send unable to contact letter with care coordinator contact information via mail. Care Coordinator will try to reach patient again in 1 month.        CHW Follow up: Monthly    CHW Plan: due for re assessment 9-8-21, Follow up on goals    CHW Next Follow Up: 9-7-21

## 2021-08-12 ENCOUNTER — OFFICE VISIT (OUTPATIENT)
Dept: FAMILY MEDICINE | Facility: CLINIC | Age: 86
End: 2021-08-12
Payer: COMMERCIAL

## 2021-08-12 VITALS
TEMPERATURE: 97 F | SYSTOLIC BLOOD PRESSURE: 128 MMHG | HEART RATE: 68 BPM | BODY MASS INDEX: 30.93 KG/M2 | DIASTOLIC BLOOD PRESSURE: 58 MMHG | WEIGHT: 148 LBS

## 2021-08-12 DIAGNOSIS — I10 ESSENTIAL HYPERTENSION: ICD-10-CM

## 2021-08-12 DIAGNOSIS — M18.12 ARTHRITIS OF CARPOMETACARPAL (CMC) JOINT OF LEFT THUMB: Primary | ICD-10-CM

## 2021-08-12 PROCEDURE — 99213 OFFICE O/P EST LOW 20 MIN: CPT | Performed by: FAMILY MEDICINE

## 2021-08-12 RX ORDER — AMLODIPINE BESYLATE 10 MG/1
TABLET ORAL
Qty: 90 TABLET | Refills: 3 | Status: SHIPPED | OUTPATIENT
Start: 2021-08-12 | End: 2022-12-20

## 2021-08-12 NOTE — PROGRESS NOTES
Assessment/ Plan  Essential hypertension  Blood pressure good upon recheck    Arthritis of carpometacarpal (CMC) joint of left thumb  Attempted injection of possible trigger finger last time which was not effective.  Will refer to orthopedics.  Has been wearing thumb spica splint.     Subjective  CC:  chief complaint  HPI:  88-year-old, comes in for follow-up on thumb/wrist pain.  Present for months.  Pain is most prominent at the carpometacarpal joint.  Hurts with movement, does not hurt much if she does not move it.  No swelling, no injury.  I saw her on 7/21/2021 to follow-up on this.  I had thought she had carpometacarpal joint arthritis in the past but I convinced myself that this could be trigger finger and did do a corticosteroid injection around the tendon.  This has not helped significantly.    PFSH:  Patient Active Problem List   Diagnosis     Diverticulosis     Esophageal reflux     Osteoarthritis     Hyperlipidemia     Essential hypertension     Healthcare maintenance     Pes anserine bursitis     Nodule of finger of left hand     Grief reaction     Asymptomatic menopausal state      Adjustment disorder with depressed mood     Rotator cuff tendonitis, right     Arthritis of carpometacarpal (CMC) joint of left thumb     Current medications reviewed as follows:  Met in consultation for:    Considerations:  Current Outpatient Medications   Medication Sig Dispense Refill     acetaminophen (TYLENOL) 325 MG tablet [ACETAMINOPHEN (TYLENOL) 325 MG TABLET] Take 650 mg by mouth every 6 (six) hours as needed for pain.       amLODIPine (NORVASC) 10 MG tablet [AMLODIPINE (NORVASC) 10 MG TABLET] Take 1 tablet by mouth once daily 90 tablet 3     hydroCHLOROthiazide (HYDRODIURIL) 25 MG tablet [HYDROCHLOROTHIAZIDE (HYDRODIURIL) 25 MG TABLET] Take 1 tablet (25 mg total) by mouth daily. 30 tablet 11     losartan (COZAAR) 100 MG tablet [LOSARTAN (COZAAR) 100 MG TABLET] Take 1 tablet (100 mg total) by mouth daily. 90 tablet  2      History   Smoking Status     Never Smoker   Smokeless Tobacco     Never Used     Social History     Social History Narrative    Patient is  to Dom  Moved to town house in 2016       Patient Care Team:  Abdulkadir Rodriguez MD as PCP - Brian Beckman as Community Health Worker (Primary Care - CC)  Ugo Garcia LICSW as Lead Care Coordinator (Primary Care - CC)  Abdulkadir Rodriguez MD as Assigned PCP  ROS  As above      Objective  Physical Exam  Vitals:    08/12/21 1356 08/12/21 1411   BP: (!) 140/71 128/58   BP Location: Right arm Left arm   Patient Position: Sitting Sitting   Cuff Size: Adult Regular Adult Regular   Pulse: 68    Temp: 97  F (36.1  C)    TempSrc: Temporal    Weight: 67.1 kg (148 lb)      Body mass index is 30.93 kg/m .  Really no reproducible tenderness about the hand.  She does have slight tenderness over a ball in the tendon just proximal to the metacarpal pharyngeal joint.  No tenderness on manipulation, axial pressure of either joint. Diagnostics:   X-ray from 5/6/2021 reveals mild primary osteoarthritis at the base of the thumb at the first carpometacarpal joint.      Please note: Voice recognition software was used in this dictation.  It may therefore contain typographical errors.

## 2021-08-12 NOTE — ASSESSMENT & PLAN NOTE
Attempted injection of possible trigger finger last time which was not effective.  Will refer to orthopedics.  Has been wearing thumb spica splint.

## 2021-08-18 ENCOUNTER — VIRTUAL VISIT (OUTPATIENT)
Dept: BEHAVIORAL HEALTH | Facility: HOSPITAL | Age: 86
End: 2021-08-18
Payer: COMMERCIAL

## 2021-08-18 DIAGNOSIS — F43.22 ADJUSTMENT DISORDER WITH ANXIETY: Primary | ICD-10-CM

## 2021-08-18 PROCEDURE — 90834 PSYTX W PT 45 MINUTES: CPT | Mod: 95 | Performed by: SOCIAL WORKER

## 2021-08-18 ASSESSMENT — ANXIETY QUESTIONNAIRES
2. NOT BEING ABLE TO STOP OR CONTROL WORRYING: NOT AT ALL
GAD7 TOTAL SCORE: 0
5. BEING SO RESTLESS THAT IT IS HARD TO SIT STILL: NOT AT ALL
6. BECOMING EASILY ANNOYED OR IRRITABLE: NOT AT ALL
3. WORRYING TOO MUCH ABOUT DIFFERENT THINGS: NOT AT ALL
1. FEELING NERVOUS, ANXIOUS, OR ON EDGE: NOT AT ALL
7. FEELING AFRAID AS IF SOMETHING AWFUL MIGHT HAPPEN: NOT AT ALL

## 2021-08-18 ASSESSMENT — PATIENT HEALTH QUESTIONNAIRE - PHQ9
SUM OF ALL RESPONSES TO PHQ QUESTIONS 1-9: 1
5. POOR APPETITE OR OVEREATING: NOT AT ALL

## 2021-08-18 NOTE — PROGRESS NOTES
"                                             Progress Note    Patient Name: Laine Sharma  Date: 8/18/2021         Service Type: Individual      Session Start Time: 9:05  Session End Time: 9:53     Session Length: 48 min    Session #: 19    Attendees: Client    Service Modality:  Phone Visit:[phone]      Provider verified identity through the following two step process.  Patient provided:  Patient address and Patient is known previously to provider    The patient has been notified of the following:      \"We have found that certain health care needs can be provided without the need for a face to face visit.  This service lets us provide the care you need with a phone conversation.       I will have full access to your [facility] medical record during this entire phone call.   I will be taking notes for your medical record.      Since this is like an office visit, we will bill your insurance company for this service.       There are potential benefits and risks of telephone visits (e.g. limits to patient confidentiality) that differ from in-person visits.?Confidentiality still applies for telephone services, and nobody will record the visit.  It is important to be in a quiet, private space that is free of distractions (including cell phone or other devices) during the visit.??      If during the course of the call I believe a telephone visit is not appropriate, you will not be charged for this service\"     Consent has been obtained for this service by care team member: Yes      Treatment Plan Last Reviewed: 7/22/2021   PHQ-9 / CHAU-7 : 1/0    DATA  Interactive Complexity: No  Crisis: No       Progress Since Last Session (Related to Symptoms / Goals / Homework):   Symptoms: Reports it is difficult to calm down during certain hours like evenings and other special moments she had with .  Continues to reports she does not eat well. Sometimes skip her meals. She is concerned about her weight. Has been " "taking some walks up to 45 min slowly but consistent. Does some reading and watch TV. Has been making some changes based on things discussed during the previous session. She  Notes some positive outcome but her thumb causes her to feel alone and helpless. Though family , sept children, have offered to help out should she have to have a surgery on her thumb.     Homework: Partially completed. Continues to express sadness related to 's death.Has tried to follow the change  discussed last session. She notes some improvement but still evenings are tough on her. Her thumb still hurting. Stays in touch with family. Has been spending sometime with friend.      Episode of Care Goals: Satisfactory progress - ACTION (Actively working towards change); Intervened by reinforcing change plan / affirming steps taken     Current / Ongoing Stressors and Concerns: The thumb is still hurting. She was able to see her PCP and waiting for the referral to hand specialist. Continues to be bothered by some family members around the Cider Chest given by her brother, some 15 years ago. Feels niece wants the chest not the brother's idea. Has a plan to visit he [facility] to see her  lewis at least once a month. Has been feeling sad because \" Dom would have taken care of me. my thumb is hurting and I have to do everything still\".      Treatment Objective(s) Addressed in This Session:   increase understanding of steps in the grief process. Patient needs ongoing support and reminder about the steps of grief, normalizing her feelings and offering empathy.     Intervention:   Solution Focused: Keep working on the following distraction activities to reduce loneliness and sadness. She already notes some improvement since the last visit.1. Take bath at night versus morning 2.  Walk in the morning and evening a day for at least 30 minutes each time. 3.Do not skip meals 4. Keep your devotion time 5. Work on your puzzles for at least 45 min " every day. 6.Do meditation using your 5 senses. 7. Call your friend or a family member of our choice. 8. Watch TV or listen to [employer]- she is yet to start listening to this radio station.      ASSESSMENT: Current Emotional / Mental Status (status of significant symptoms):   Risk status (Self / Other harm or suicidal ideation)   Patient denies current fears or concerns for personal safety.   Patient denies current or recent suicidal ideation or behaviors.   Patient denies current or recent homicidal ideation or behaviors.   Patient denies current or recent self injurious behavior or ideation.   Patient denies other safety concerns.   Patient reports there has been no change in risk factors since their last session.     Patient reports there has been no change in protective factors since their last session.     Recommended that patient call 911 or go to the local ED should there be a change in any of these risk factors.     Appearance:   Appropriate  not seen, not assessed    Eye Contact:   not seen, not assessed    Psychomotor Behavior: Normal    Attitude:   Cooperative    Orientation:   Person Place Time Situation   Speech    Rate / Production: Normal/ Responsive    Volume:  Normal    Mood:    Not assessed, not seen   Affect:    Appropriate    Thought Content:  Clear    Thought Form:  Coherent  Logical    Insight:    Good      Medication Review:   No current psychiatric medications prescribed     Medication Compliance:   Yes     Changes in Health Issues:   None reported     Chemical Use Review:   Substance Use: Chemical use reviewed, no active concerns identified      Tobacco Use: No current tobacco use.      Diagnosis:  1. Adjustment disorder with anxiety      Collateral Reports Completed:   Not Applicable    PLAN: (Patient Tasks / Therapist Tasks / Other):   Patient talk to her friends/family members  when she feels sad for support  Patient will follow up with her MD on the referral she said was made to see a hand  specialist.  Patient will keep up with her walks to distract herself and stay in shape  Patient will restructure her day using different options discussed to reduce loneliness she experiences in the evening.     Anjelicamaulikalyssa JOAQUIM Stafford  __________________________________________________________________  Treatment Plan    Patient's Name: Laine Sharma  YOB: 1933    Date: 7/22/2021    DSM5 Diagnoses:   1. Grief reaction    2. Adjustment disorder with anxiety      Psychosocial / Contextual Factors: Lost  late last year. Pain in the  Thumb. Family issues.     WHODAS: 12    Referral / Collaboration:  No referral was needed today.     Anticipated number of session or this episode of care: 12    MeasurableTreatment Goal(s) related to diagnosis / functional impairment(s)  Goal 1: Patient will talk to her friend or sister daily for suppot    I will know I've met my goal when I don't cry every time I am alone.      Objective #A (Patient Action)    Patient will talk to at least two others about losses and coping.  Status: Restarted - Date: 7/22/2021     Intervention(s)  Therapist will assign homework : go to [employer] with neighbor friend.    Objective #B  Patient will increase understanding of steps in the grief process.  Status: Continued - Date(s): 7/22/2021    Intervention(s)  Therapist will teach emotional regulation skills. asisting patient to create, collect memories about .    Objective #C  Patient will identify 3 strategies to more effectively address stressors.  Status: Restarted - Date: 7/22/2021     Intervention(s)  Therapist will Continue to assist patient with looking for open grief suppot near her home. .    Patient has reviewed and agreed to the above plan.    JOAQUIM Newell  July 22, 2021

## 2021-08-19 ASSESSMENT — ANXIETY QUESTIONNAIRES: GAD7 TOTAL SCORE: 0

## 2021-08-20 ENCOUNTER — TELEPHONE (OUTPATIENT)
Dept: CARE COORDINATION | Facility: CLINIC | Age: 86
End: 2021-08-20

## 2021-08-20 ENCOUNTER — PATIENT OUTREACH (OUTPATIENT)
Dept: NURSING | Facility: CLINIC | Age: 86
End: 2021-08-20

## 2021-08-20 NOTE — PROGRESS NOTES
Clinic Care Coordination Contact    Follow Up Progress Note      Assessment: MIKEY contacted Laine to follow up on concerns regarding a referral for her hand pain. She reported she has not heard from anyone. SW confirmed the referral was entered, will follow up on this.     SW called [facility] and they reported they don't have Laine in their system. MIKEY faxed the referral information to [facility] at [phone].    Care Gaps:    Health Maintenance Due   Topic Date Due     ZOSTER IMMUNIZATION (2 of 3) 03/15/2012     MEDICARE ANNUAL WELLNESS VISIT  09/03/2021       Not addressed    Goals addressed this encounter:   Goals Addressed                    This Visit's Progress       Medical (pt-stated)         Goal Statement: I want to have an appointment with a specialist for my hand concerns as soon as possible  Date Goal set: 08/20/21  Barriers:   Strengths:   Date to Achieve By: ASAP  Patient expressed understanding of goal: Yes  Action steps to achieve this goal:  1. I will wait to hear from [facility] to schedule  2. I will update CCC team                Intervention/Education provided during outreach: See above          Plan:   MIKEY will follow up next week to see if Laine heard from anyone

## 2021-08-23 ENCOUNTER — TRANSFERRED RECORDS (OUTPATIENT)
Dept: HEALTH INFORMATION MANAGEMENT | Facility: CLINIC | Age: 86
End: 2021-08-23

## 2021-08-24 ENCOUNTER — PATIENT OUTREACH (OUTPATIENT)
Dept: NURSING | Facility: CLINIC | Age: 86
End: 2021-08-24

## 2021-08-24 ENCOUNTER — DOCUMENTATION ONLY (OUTPATIENT)
Dept: BEHAVIORAL HEALTH | Facility: HOSPITAL | Age: 86
End: 2021-08-24

## 2021-08-24 NOTE — PROGRESS NOTES
Writer talked to the patient this evening since she was not heard at her scheduled time today. Patient has been busy with her niece in the town. She has been experiencing pain in her thumb. She is in touch with her provider. Her next appt is in 2 weeks.

## 2021-08-24 NOTE — PROGRESS NOTES
Clinic Care Coordination Contact    Follow Up Progress Note      Assessment: [facility] SW contacted Laine to confirm if she heard from [facility]. She reported she was able to be seen yesterday and it has been taken care of. Goal completed     Care Gaps:    Health Maintenance Due   Topic Date Due     ZOSTER IMMUNIZATION (2 of 3) 03/15/2012     MEDICARE ANNUAL WELLNESS VISIT  09/03/2021       Not addressed    Goals addressed this encounter:   Goals Addressed                    This Visit's Progress       COMPLETED: Medical (pt-stated)         I have attended my appointment with [facility]            Intervention/Education provided during outreach: See above          Plan:   Standard Outreach

## 2021-09-07 ENCOUNTER — PATIENT OUTREACH (OUTPATIENT)
Dept: NURSING | Facility: CLINIC | Age: 86
End: 2021-09-07

## 2021-09-07 NOTE — PROGRESS NOTES
9/7/2021  Clinic Care Coordination Contact    Community Health Worker Follow Up    Care Gaps:     Health Maintenance Due   Topic Date Due     ZOSTER IMMUNIZATION (2 of 3) 03/15/2012     INFLUENZA VACCINE (1) 09/01/2021     MEDICARE ANNUAL WELLNESS VISIT  09/03/2021       Care Gaps Last addressed on 9-7-21 will get Flu shot at [facility] close to home. will call the clinic to schedule for Medicare Annual Wellness Visit and discuss immunization at wellness visit.    Goals:   Goals Addressed as of 9/7/2021 at 12:05 PM                    Today       Other (pt-stated)   90%    Added 7/20/21 by Brian Bojorquez      Goal Statement: I want to be connected to grief support groups in close to my home (grief,  recently in memory care) within 3 months  Date Goal set: updated 7-20-21  Barriers: Unsure of resources  Strengths: Children support  Date to Achieve By:   Patient expressed understanding of goal: yes  Action steps to achieve this goal:  1. I will attend grief support group on 9-20-21 at 6:30pm at [facility]  2. I will call Andie Paredes, Volunteer Facilitator [phone] at [facility] if I have any questions,  3. I will update [facility] team at outreach.    Resources Grief Support Group  [email or web address]  [facility] group  Mondays at 6:30 pm   Andie Paredes, Volunteer facilitator  [phone]  [place] for this group   Next meeting   Monday, May 03, 2021   6:30 pm - 8:30 pm View meeting schedule   [place] [address]       Updated: 9-7-21 AL              Other (pt-stated)   30%    Added 7/20/21 by Brian Bojorquez      Goal Statement: I would like support to apply for [employer] within in the next 2 months.  Date Goal set: 6/4/2021  Barriers: limited transportation, not able to drive  Strengths: support from family member (Ismael)  Date to Achieve By: 8-4-21  Patient expressed  understanding of goal: Yes  Action steps to achieve this goal:  1. I will talk to [facility] MIKEY tomorrow  9-8-21 at 7:30am for support to fill out [employer] application.  2. I will update [facility] 9-20-21 at 10am     Updated: 9-7-21 AL            Intervention and Education during outreach:   Called and spoke to patient and follow up on goals.  Patient reported:  -did not hear back from [employer] or [facility].    Conference call with patient and connected with Andie Pardees, Volunteer Facilitator [phone] at [facility]  Resources Grief Support Group  [facility] group  Mondays at 6:30 pm   Andie stated spoke to patient before and registered for the fall group starting 9-20-21.  Stated patient can come on 9-20-21 at 6:30pm.  Patient confirmed date and time of 1st day of support group.    Conference call with patient and connected with [email or web address] [phone] and spoke to Shae regarding status of application.  Stated she can't find patient's name in the system.  Discussed with patient to complete new application since she does not know what happened to the 1st application.    Patient preferred early morning appt.  Scheduled appt with [facility] for tomorrow 9-8-21 at 7:30am for support to complete [employer] Application and schedule re assessment with  MIKEY 9-20-21 at 10am.    Review health maintenance due.  Patient will call to schedule appt with PCP for Medicare Wellness visit  Will get flu shot at [facility] MIKEY 9-8-21 a   MIKEY reassessment 9-20-21 at 10am  CHW Follow up: Monthly    CHW Plan: Follow up on goals    CHW Next Follow Up: 10-20-21

## 2021-09-08 ENCOUNTER — PATIENT OUTREACH (OUTPATIENT)
Dept: NURSING | Facility: CLINIC | Age: 86
End: 2021-09-08

## 2021-09-08 ASSESSMENT — ACTIVITIES OF DAILY LIVING (ADL): DEPENDENT_IADLS:: INDEPENDENT

## 2021-09-08 NOTE — PROGRESS NOTES
Clinic Care Coordination Contact  CCC MIKEY contacted Laine to complete an updated assessment for continued enrollment in [facility].    MIKEY reviewed [employer] application, decided not to complete the application because it is directed toward more physically disabled people. She doesn't have any problems with this area, is just looking for extra support when she doesn't want to drive far. SW provided her the phone number for [employer]. Will see if there are any other [facility] Transportation resources and mail them to her.     She has the grief group coming up in September.       Clinic Care Coordination Contact  OUTREACH    Referral Information:  Referral Source: Care Team    Primary Diagnosis: Psychosocial    Chief Complaint   Patient presents with     Clinic Care Coordination - Initial     Re-assessment        Universal Utilization:   Clinic Utilization  Difficulty keeping appointments:: No  Compliance Concerns: No  No-Show Concerns: No  No PCP office visit in Past Year: No  Utilization    Hospital Admissions  0             ED Visits  0             No Show Count (past year)  2                Current as of: 9/7/2021  1:43 PM              Clinical Concerns:  Current Medical Concerns:  None reported    Current Behavioral Concerns: None reported    Education Provided to patient: Role of CCC   Pain  Pain (GOAL):: No  Health Maintenance Reviewed: Not assessed  Clinical Pathway: None    Medication Management:  Medication review status: Medications reviewed and no changes reported per patient.             Functional Status:  Dependent ADLs:: Independent  Dependent IADLs:: Independent  Bed or wheelchair confined:: No  Mobility Status: Independent  Fallen 2 or more times in the past year?: No  Any fall with injury in the past year?: No    Living Situation:  Current living arrangement:: I live alone  Type of residence:: Private home - no stairs    Lifestyle & Psychosocial Needs:    Social Determinants of Health     Tobacco  Use: Low Risk      Smoking Tobacco Use: Never Smoker     Smokeless Tobacco Use: Never Used   Alcohol Use:      Frequency of Alcohol Consumption:      Average Number of Drinks:      Frequency of Binge Drinking:    Financial Resource Strain:      Difficulty of Paying Living Expenses:    Food Insecurity:      Worried About Running Out of Food in the Last Year:      Ran Out of Food in the Last Year:    Transportation Needs:      Lack of Transportation (Medical):      Lack of Transportation (Non-Medical):    Physical Activity:      Days of Exercise per Week:      Minutes of Exercise per Session:    Stress:      Feeling of Stress :    Social Connections:      Frequency of Communication with Friends and Family:      Frequency of Social Gatherings with Friends and Family:      Attends Religion Services:      Active Member of Clubs or Organizations:      Attends Club or Organization Meetings:      Marital Status:    Intimate Partner Violence:      Fear of Current or Ex-Partner:      Emotionally Abused:      Physically Abused:      Sexually Abused:    Depression: Not at risk     PHQ-2 Score: 1   Housing Stability:      Unable to Pay for Housing in the Last Year:      Number of Places Lived in the Last Year:      Unstable Housing in the Last Year:      Inadequate nutrition (GOAL):: No  Tube Feeding: No  Inadequate activity/exercise (GOAL):: No  Significant changes in sleep pattern (GOAL): No  Transportation means:: Regular car     Religion or spiritual beliefs that impact treatment:: No  Mental health DX:: No  Mental health management concern (GOAL):: Yes  Chemical Dependency Status: No Current Concerns  Informal Support system:: Children, Ciara based             Resources and Interventions:  Current Resources:         Supplies used at home:: None  Equipment Currently Used at Home: none  Employment Status: retired         Advance Care Plan/Directive  Advanced Care Plans/Directives on file:: No  Advanced Care Plan/Directive  Status: Not Applicable          Goals:   Goals        General     Other (pt-stated)      Notes - Note edited  9/7/2021 12:02 PM by Brian Bojorquez     Goal Statement: I want to be connected to grief support groups in close to my home (grief,  recently in memory care) within 3 months  Date Goal set: updated 7-20-21  Barriers: Unsure of resources  Strengths: Children support  Date to Achieve By:   Patient expressed understanding of goal: yes  Action steps to achieve this goal:  1. I will attend grief support group on 9-20-21 at 6:30pm at [facility]  2. I will call Andie Paredes, Volunteer Facilitator [phone] at [facility] if I have any questions,  3. I will update [facility] team at outreach.    Resources Grief Support Group  [email or web address]  [facility] group  Mondays at 6:30 pm   Andie Paredes Volunteer facilitator  [phone]  [place] for this group   Next meeting   Monday, May 03, 2021   6:30 pm - 8:30 pm View meeting schedule   [place] [address]       Updated: 9-7-21 AL            Other (pt-stated)      Notes - Note edited  9/8/2021 10:22 AM by Ugo Garcia LICSW     Goal Statement: I would like support to apply for [employer] within in the next 2 months.  Date Goal set: 6/4/2021  Barriers: limited transportation, not able to drive  Strengths: support from family member (Ismael)  Date to Achieve By: 8-4-21  Patient expressed understanding of goal: Yes  Action steps to achieve this goal:  1. I will talk to [employer] to see if they are a good option for Transportation  2. I will review any transportation resources from                  Patient/Caregiver understanding: Reported understanding     Outreach Frequency: monthly  Future Appointments              In 1 week Rosa Stafford LICSW [facility], [facility]  [facility]    In 1 week [facility], [facility]    In 3 weeks Rosa Stafford, JOAQUIM [facility], [facility]          Plan: Standard Outreach

## 2021-09-08 NOTE — LETTER
[facility]  Patient Centered Plan of Care  About Me:        Patient Name:  Laine Sharma    YOB: 1933  Age:         88 year old   Greg MRN:    [id number] Telephone Information:  Home Phone [phone]   Mobile [phone]       Address:  [address]  [place] [address] Email address:  No e-mail address on record      Emergency Contact(s)    Name Relationship Lgl Grd Work Phone Home Phone Mobile Phone   1. KYLER ALBERTO Sister-in-Law   [phone]    2. VALERIO SHARMA Spouse   [phone]            Primary language:  English     needed? No   [employer]:  [phone] op. 1  Other communication barriers:    Preferred Method of Communication:     Current living arrangement: I live alone  Mobility Status/ Medical Equipment: Independent    Health Maintenance  Health Maintenance Reviewed: Not assessed    My Access Plan  Medical Emergency 911   Primary Clinic Line [facility] [phone]   24 Hour Appointment Line [phone] or  [phone] ([phone]) (toll-free)   24 Hour Nurse Line [phone] (toll-free)   Preferred Urgent Care [facility] [phone]   Preferred Hospital [facility]  [phone]   Preferred Pharmacy [facility] [address] [place] [address] SO.     Behavioral Health Crisis Line The National Suicide Prevention Lifeline at 1-241.698.5422 or 726             My Care Team Members  Patient Care Team       Relationship Specialty Notifications Start End    Abdulkadir Rodriguez MD PCP - General   11/6/07     Phone: [phone] Fax: [phone]         [address] [place] [address]    Brian Bojorquez Community Health Worker Primary Care - [facility] Admissions 9/8/20     Phone: [phone] Fax: [phone]         [address] [place] [address]    [facility] Lead Care Coordinator Primary Care - [facility] Admissions 9/8/20     Abdulkadir Rodriguez MD  Assigned PCP   6/16/21     Phone: [phone] Fax: [phone]         [address] [place] [address]            My Care Plans  Self Management and Treatment Plan  Goals and (Comments)  Goals        General     Other (pt-stated)      Notes - Note edited  9/7/2021 12:02 PM by Brian Bojorquez     Goal Statement: I want to be connected to grief support groups in close to my home (grief,  recently in memory care) within 3 months  Date Goal set: updated 7-20-21  Barriers: Unsure of resources  Strengths: Children support  Date to Achieve By:   Patient expressed understanding of goal: yes  Action steps to achieve this goal:  1. I will attend grief support group on 9-20-21 at 6:30pm at [facility]  2. I will call Andie Paredes, Volunteer Facilitator [phone] at [facility] if I have any questions,  3. I will update [facility] team at outreach.    Resources Grief Support Group  [email or web address]  [facility] group  Mondays at 6:30 pm   Andie Paredes, Volunteer facilitator  [phone]  [place] for this group   Next meeting   Monday, May 03, 2021   6:30 pm - 8:30 pm View meeting schedule   Group location  [address] [address]       Updated: 9-7-21 [place]            Other (pt-stated)      Notes - Note edited  9/8/2021 10:22 AM by Ugo Garcia, LICSW     Goal Statement: I would like support to apply for [employer] Transportation within in the next 2 months.  Date Goal set: 6/4/2021  Barriers: limited transportation, not able to drive  Strengths: support from family member (Ismael)  Date to Achieve By: 8-4-21  Patient expressed understanding of goal: Yes  Action steps to achieve this goal:  1. I will talk to [employer] to see if they are a good option for Transportation  2. I will review any transportation resources from                   Action Plans on File:                       Advance Care Plans/Directives Type:         My Medical and Care Information  Problem List   Patient Active Problem List   Diagnosis     Diverticulosis     Esophageal reflux     Osteoarthritis     Hyperlipidemia     Essential hypertension     Healthcare maintenance     Pes anserine bursitis     Nodule of finger of left hand     Grief reaction     Asymptomatic menopausal state      Adjustment disorder with depressed mood     Rotator cuff tendonitis, right     Arthritis of carpometacarpal (CMC) joint of left thumb      Current Medications and Allergies:  See printed Medication Report.    Care Coordination Start Date: 9/8/2020   Frequency of Care Coordination: monthly   Form Last Updated: 09/08/2021

## 2021-09-15 ENCOUNTER — VIRTUAL VISIT (OUTPATIENT)
Dept: BEHAVIORAL HEALTH | Facility: HOSPITAL | Age: 86
End: 2021-09-15
Payer: COMMERCIAL

## 2021-09-15 ENCOUNTER — DOCUMENTATION ONLY (OUTPATIENT)
Dept: BEHAVIORAL HEALTH | Facility: HOSPITAL | Age: 86
End: 2021-09-15

## 2021-09-15 DIAGNOSIS — F43.21 GRIEF REACTION: Primary | ICD-10-CM

## 2021-09-15 PROCEDURE — 90834 PSYTX W PT 45 MINUTES: CPT | Mod: 95 | Performed by: SOCIAL WORKER

## 2021-09-15 ASSESSMENT — ANXIETY QUESTIONNAIRES
IF YOU CHECKED OFF ANY PROBLEMS ON THIS QUESTIONNAIRE, HOW DIFFICULT HAVE THESE PROBLEMS MADE IT FOR YOU TO DO YOUR WORK, TAKE CARE OF THINGS AT HOME, OR GET ALONG WITH OTHER PEOPLE: NOT DIFFICULT AT ALL
6. BECOMING EASILY ANNOYED OR IRRITABLE: NOT AT ALL
2. NOT BEING ABLE TO STOP OR CONTROL WORRYING: NOT AT ALL
7. FEELING AFRAID AS IF SOMETHING AWFUL MIGHT HAPPEN: NOT AT ALL
1. FEELING NERVOUS, ANXIOUS, OR ON EDGE: NOT AT ALL
3. WORRYING TOO MUCH ABOUT DIFFERENT THINGS: NOT AT ALL

## 2021-09-15 ASSESSMENT — PATIENT HEALTH QUESTIONNAIRE - PHQ9
5. POOR APPETITE OR OVEREATING: NOT AT ALL
SUM OF ALL RESPONSES TO PHQ QUESTIONS 1-9: 0

## 2021-09-15 NOTE — PROGRESS NOTES
"                                             Progress Note    Patient Name: Laine Sharma  Date:9/15/2021         Service Type: Individual      Session Start Time: 9:05  Session End Time: 9:53     Session Length: 48 min    Session #: 20    Attendees: Client    Service Modality:  Phone Visit:[phone]      Provider verified identity through the following two step process.  Patient provided:  Patient address and Patient is known previously to provider    The patient has been notified of the following:      \"We have found that certain health care needs can be provided without the need for a face to face visit.  This service lets us provide the care you need with a phone conversation.       I will have full access to your [facility] medical record during this entire phone call.   I will be taking notes for your medical record.      Since this is like an office visit, we will bill your insurance company for this service.       There are potential benefits and risks of telephone visits (e.g. limits to patient confidentiality) that differ from in-person visits.?Confidentiality still applies for telephone services, and nobody will record the visit.  It is important to be in a quiet, private space that is free of distractions (including cell phone or other devices) during the visit.??      If during the course of the call I believe a telephone visit is not appropriate, you will not be charged for this service\"     Consent has been obtained for this service by care team member: Yes      Treatment Plan Last Reviewed: 7/22/2021   PHQ-9 / CHAU-7 : 0/0    DATA  Interactive Complexity: No  Crisis: No       Progress Since Last Session (Related to Symptoms / Goals / Homework):   Symptoms: Reports it is difficult to calm down during certain hours like evenings and other special moments she had with .   Reports her thumb is much better after the cortisone shots. Her evenings are still rough on her due to being alone. " "    Homework: Partially completed. Continues to express sadness related to 's death.Has tried to follow the change  discussed in previous sessions. She notes some improvement but still evenings are tough on her. Still has not found an open support group near her home due to covid.  Stays in touch with family. Has been spending sometime with her friend and cat.      Episode of Care Goals: Satisfactory progress - ACTION (Actively working towards change); Intervened by reinforcing change plan / affirming steps taken . Reports some motivation to do things.  Reports interest to try new things. continues to try new tips to have a decent evening.      Current / Ongoing Stressors and Concerns: \"No new family drama with niece. My thumb is getting better after the cortisone shot.  Has been trying to remember the name of the person who let her know about the support group but remembered the location: [facility]. She will call them again with he number provided: [phone] to verify the next enrolment. Patient will need transportation to and from her support group. Most of these groups start later in the evenings. It gets darker quickly and the rough winter season in approaching. This wont be easy for the patient unless she has reliable transportation like [employer]. Writer will coordinate with the PCP team to assist with this issue. Nephew in [place] has offered her to bring her to spend some time during Winter. She is not sure because of her cat. She is no sure if she can travel with her. Though agreed to talk to nephew on how to arrange to make sure the cat also goes with her.      Treatment Objective(s) Addressed in This Session:   increase understanding of steps in the grief process. Patient needs ongoing support and reminder about the steps of grief, normalizing her feelings and offering empathy.     Intervention:   Solution Focused: Keep working on the following distraction activities to reduce " loneliness and sadness. She already notes some improvement since the last visit.1. Take bath at night versus morning 2.  Walk in the morning and evening a day for at least 30 minutes each time. 3.Do not skip meals 4. Keep your devotion time 5. Work on your puzzles for at least 45 min every day. 6.Do meditation using your 5 senses. 7. Call your friend or a family member of our choice. 8. Watch TV or listen to [employer]- she is yet to start listening to this radio station.      ASSESSMENT: Current Emotional / Mental Status (status of significant symptoms):   Risk status (Self / Other harm or suicidal ideation)   Patient denies current fears or concerns for personal safety.   Patient denies current or recent suicidal ideation or behaviors.   Patient denies current or recent homicidal ideation or behaviors.   Patient denies current or recent self injurious behavior or ideation.   Patient denies other safety concerns.   Patient reports there has been no change in risk factors since their last session.     Patient reports there has been no change in protective factors since their last session.     Recommended that patient call 911 or go to the local ED should there be a change in any of these risk factors.     Appearance:   Appropriate  not seen, not assessed    Eye Contact:   not seen, not assessed    Psychomotor Behavior: Normal    Attitude:   Cooperative    Orientation:   Person Place Time Situation   Speech    Rate / Production: Normal/ Responsive    Volume:  Normal    Mood:    Not assessed, not seen   Affect:    Appropriate    Thought Content:  Clear    Thought Form:  Coherent  Logical    Insight:    Good      Medication Review:   No current psychiatric medications prescribed     Medication Compliance:   Yes     Changes in Health Issues:   None reported     Chemical Use Review:   Substance Use: Chemical use reviewed, no active concerns identified      Tobacco Use: No current tobacco use.      Diagnosis:  1. Grief reaction       Collateral Reports Completed:   Not Applicable    PLAN: (Patient Tasks / Therapist Tasks / Other):   Patient talk to her friends/family members  when she feels sad for support.  Patient will keep up with her walks to distract herself and stay in shape  Patient will restructure her day using different options discussed to reduce loneliness she experiences in the evening.   Patient will call the [facility] about the upcoming sessions for grief  Patient also will reflect on the offer from Nephew to travel to [place] to stay with him in Winter.     Rosa Stafford, LICSW  __________________________________________________________________  Treatment Plan    Patient's Name: Laine Sharma  YOB: 1933    Date: 7/22/2021    DSM5 Diagnoses:   1. Grief reaction    2. Adjustment disorder with anxiety      Psychosocial / Contextual Factors: Lost  late last year. Pain in the  Thumb. Family issues.     WHODAS: 12    Referral / Collaboration:  No referral was needed today.     Anticipated number of session or this episode of care: 12    MeasurableTreatment Goal(s) related to diagnosis / functional impairment(s)  Goal 1: Patient will talk to her friend or sister daily for suppot    I will know I've met my goal when I don't cry every time I am alone.      Objective #A (Patient Action)    Patient will talk to at least two others about losses and coping.  Status: Restarted - Date: 7/22/2021     Intervention(s)  Therapist will assign homework : go to [employer] with neighbor friend.    Objective #B  Patient will increase understanding of steps in the grief process.  Status: Continued - Date(s): 7/22/2021    Intervention(s)  Therapist will teach emotional regulation skills. asisting patient to create, collect memories about .    Objective #C  Patient will identify 3 strategies to more effectively address stressors.  Status: Restarted - Date: 7/22/2021     Intervention(s)  Therapist will Continue to  assist patient with looking for open grief suppot near her home. .    Patient has reviewed and agreed to the above plan.    Rosa Stafford, [facility] HospitalMIKEY  July 22, 2021

## 2021-09-29 ENCOUNTER — VIRTUAL VISIT (OUTPATIENT)
Dept: BEHAVIORAL HEALTH | Facility: HOSPITAL | Age: 86
End: 2021-09-29
Payer: COMMERCIAL

## 2021-09-29 DIAGNOSIS — F43.21 GRIEF REACTION: Primary | ICD-10-CM

## 2021-09-29 PROCEDURE — 90834 PSYTX W PT 45 MINUTES: CPT | Mod: TEL | Performed by: SOCIAL WORKER

## 2021-09-29 ASSESSMENT — PATIENT HEALTH QUESTIONNAIRE - PHQ9
5. POOR APPETITE OR OVEREATING: NOT AT ALL
SUM OF ALL RESPONSES TO PHQ QUESTIONS 1-9: 0

## 2021-09-29 ASSESSMENT — ANXIETY QUESTIONNAIRES
GAD7 TOTAL SCORE: 0
5. BEING SO RESTLESS THAT IT IS HARD TO SIT STILL: NOT AT ALL
3. WORRYING TOO MUCH ABOUT DIFFERENT THINGS: NOT AT ALL
2. NOT BEING ABLE TO STOP OR CONTROL WORRYING: NOT AT ALL
IF YOU CHECKED OFF ANY PROBLEMS ON THIS QUESTIONNAIRE, HOW DIFFICULT HAVE THESE PROBLEMS MADE IT FOR YOU TO DO YOUR WORK, TAKE CARE OF THINGS AT HOME, OR GET ALONG WITH OTHER PEOPLE: NOT DIFFICULT AT ALL
6. BECOMING EASILY ANNOYED OR IRRITABLE: NOT AT ALL
7. FEELING AFRAID AS IF SOMETHING AWFUL MIGHT HAPPEN: NOT AT ALL
1. FEELING NERVOUS, ANXIOUS, OR ON EDGE: NOT AT ALL
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
## 2021-09-30 ASSESSMENT — ANXIETY QUESTIONNAIRES: GAD7 TOTAL SCORE: 0

## 2021-10-13 ENCOUNTER — VIRTUAL VISIT (OUTPATIENT)
Dept: BEHAVIORAL HEALTH | Facility: HOSPITAL | Age: 86
End: 2021-10-13
Payer: COMMERCIAL

## 2021-10-13 ENCOUNTER — DOCUMENTATION ONLY (OUTPATIENT)
Dept: BEHAVIORAL HEALTH | Facility: HOSPITAL | Age: 86
End: 2021-10-13

## 2021-10-13 DIAGNOSIS — F43.22 ADJUSTMENT DISORDER WITH ANXIETY: Primary | ICD-10-CM

## 2021-10-13 PROCEDURE — 90834 PSYTX W PT 45 MINUTES: CPT | Mod: TEL | Performed by: SOCIAL WORKER

## 2021-10-13 NOTE — PROGRESS NOTES
"                                             Progress Note    Patient Name: Laine Sharma  Date:10/13/2021         Service Type: Individual      Session Start Time: 9:05  Session End Time: 9:55     Session Length: 50 min    Session #: 22    Attendees: Client    Service Modality:  Phone Visit:[phone]      Provider verified identity through the following two step process.  Patient provided:  Patient address and Patient is known previously to provider    The patient has been notified of the following:      \"We have found that certain health care needs can be provided without the need for a face to face visit.  This service lets us provide the care you need with a phone conversation.       I will have full access to your [facility] medical record during this entire phone call.   I will be taking notes for your medical record.      Since this is like an office visit, we will bill your insurance company for this service.       There are potential benefits and risks of telephone visits (e.g. limits to patient confidentiality) that differ from in-person visits.?Confidentiality still applies for telephone services, and nobody will record the visit.  It is important to be in a quiet, private space that is free of distractions (including cell phone or other devices) during the visit.??      If during the course of the call I believe a telephone visit is not appropriate, you will not be charged for this service\"     Consent has been obtained for this service by care team member: Yes      Treatment Plan Last Reviewed: 7/22/2021   PHQ-9 / CHAU-7 : 0/0    DATA  Interactive Complexity: No  Crisis: No       Progress Since Last Session (Related to Symptoms / Goals / Homework):   Symptoms: Reports it is difficult to calm down during certain hours like evenings and other special moments she had with .   Reports her thumb is still better since started to get the cortisone shots. Her evenings are still rough on her due " "to being alone.     Homework: Partially completed. Continues to express sadness related to 's death.Has tried to follow the change  discussed in previous sessions. She notes some improvement but still evenings are tough on her. She won't attend her support group until she is granted [employer] mobility rides.  Has been spending sometime with her neighbor friend and cat.    Episode of Care Goals: Satisfactory progress - ACTION (Actively working towards change); Intervened by reinforcing change plan / affirming steps taken . Reports some motivation to do things.  Reports interest to try new things. continues to try new tips to have a decent evening.     Current / Ongoing Stressors and Concerns:  Patient cancelled her appt today thinking she had to come to see this writer in person. She did not think she would then as she was not feeling well today. The morning \"made me feel down and sad. I can't believe he is gone\". Patient's 's anniversary is approaching. She is not feeling well as the memories are overwhelming. She and writer have completed her [employer] mobility application. This was sent out this week. Patient will then be able to join other support groups. She feels this will help feel some kind of normalcy.  She agreed to start thinking about Thanksgiving; do her shopping with her neighbor as possible. This allows her to keep busy and to do things that connect her with  in a meaningful way. She stated, \" I will do it for Dom\". She is used to make cookies and his favorite Thanksgiving recipes. Otherwise, she reports she takes some walk when the weather permits and goes to Jain every Sunday. She might join some prayer team in the evenings if she can go with someone. She is in touch with her step son. He is offering help in different ways but she is hesitating to give him a hard time. Wishes to go to [employer] but driving there alone is complicated.  No safety concern reported today. "   Treatment Objective(s) Addressed in This Session:   increase understanding of steps in the grief process. Patient needs ongoing support and reminder about the steps of grief, normalizing her feelings and offering empathy.     Intervention:   Solution Focused: Keep working on the following distraction activities to reduce loneliness and sadness. These were revisited and reinforced today.1. Take bath at night versus morning 2.  Walk in the morning and evening a day for at least 30 minutes each time. 3.Do not skip meals 4. Keep your devotion time 5. Work on your puzzles for at least 45 min every day. 6.Do meditation using your 5 senses. 7. Call your friend or a family member of our choice. 8. Watch TV or listen to [employer]- she is yet to start listening to this radio station.      ASSESSMENT: Current Emotional / Mental Status (status of significant symptoms):   Risk status (Self / Other harm or suicidal ideation)   Patient denies current fears or concerns for personal safety.   Patient denies current or recent suicidal ideation or behaviors.   Patient denies current or recent homicidal ideation or behaviors.   Patient denies current or recent self injurious behavior or ideation.   Patient denies other safety concerns.   Patient reports there has been no change in risk factors since their last session.     Patient reports there has been no change in protective factors since their last session.     Recommended that patient call 911 or go to the local ED should there be a change in any of these risk factors.     Appearance:   Appropriate  not seen, not assessed    Eye Contact:   not seen, not assessed    Psychomotor Behavior: Normal    Attitude:   Cooperative    Orientation:   Person Place Time Situation   Speech    Rate / Production: Normal/ Responsive    Volume:  Normal    Mood:    Not assessed, not seen   Affect:    Appropriate    Thought Content:  Clear    Thought Form:  Coherent  Logical    Insight:    Good       Medication Review:   No current psychiatric medications prescribed     Medication Compliance:   Yes     Changes in Health Issues:   None reported     Chemical Use Review:   Substance Use: Chemical use reviewed, no active concerns identified      Tobacco Use: No current tobacco use.      Diagnosis:  1. Adjustment disorder with anxiety      Collateral Reports Completed:   Not Applicable    PLAN: (Patient Tasks / Therapist Tasks / Other):   Patient talk to her friends/family members  when she feels sad for support.  Patient will keep up with her walks to distract herself and stay in shape  Patient will restructure her day using different options discussed to reduce loneliness she experiences in the evening.   Patient will sign her application for [employer] mobility this week.   Patient also will reflect on the offer from Nephew to travel to [place] to stay with him in Winter.     [facility]  __________________________________________________________________  Treatment Plan    Patient's Name: Laine Sharma  YOB: 1933    Date: 7/22/2021    DSM5 Diagnoses:   1. Grief reaction    2. Adjustment disorder with anxiety      Psychosocial / Contextual Factors: Lost  late last year. Pain in the  Thumb. Family issues.     WHODAS: 12    Referral / Collaboration:  No referral was needed today.     Anticipated number of session or this episode of care: 12    MeasurableTreatment Goal(s) related to diagnosis / functional impairment(s)  Goal 1: Patient will talk to her friend or sister daily for suppot    I will know I've met my goal when I don't cry every time I am alone.      Objective #A (Patient Action)    Patient will talk to at least two others about losses and coping.  Status: Restarted - Date: 7/22/2021     Intervention(s)  Therapist will assign homework : go to Judaism with neighbor friend.    Objective #B  Patient will increase understanding of steps in the grief process.  Status: Continued -  Date(s): 7/22/2021    Intervention(s)  Therapist will teach emotional regulation skills. asisting patient to create, collect memories about .    Objective #C  Patient will identify 3 strategies to more effectively address stressors.  Status: Restarted - Date: 7/22/2021     Intervention(s)  Therapist will Continue to assist patient with looking for open grief suppot near her home. .    Patient has reviewed and agreed to the above plan.    Rosa Stafford, JOAQUIM  July 22, 2021

## 2021-10-13 NOTE — PROGRESS NOTES
Patient and writer completed the patient's application for [employer] mobility. She will need rides to and from her support group.

## 2021-10-20 ENCOUNTER — PATIENT OUTREACH (OUTPATIENT)
Dept: CARE COORDINATION | Facility: CLINIC | Age: 86
End: 2021-10-20

## 2021-10-20 NOTE — PROGRESS NOTES
10/20/2021  Clinic Care Coordination Contact  [facility]/Voicemail       Clinical Data: Care Coordinator Outreach  Outreach attempted x 1.  Left message on patient's voicemail with call back information and requested return call.    Plan: Care Coordinator  will try to reach patient again in 10 business days.    CHW follow up: 11-3-21

## 2021-10-26 ENCOUNTER — PATIENT OUTREACH (OUTPATIENT)
Dept: CARE COORDINATION | Facility: CLINIC | Age: 86
End: 2021-10-26

## 2021-10-27 ENCOUNTER — VIRTUAL VISIT (OUTPATIENT)
Dept: BEHAVIORAL HEALTH | Facility: HOSPITAL | Age: 86
End: 2021-10-27
Payer: COMMERCIAL

## 2021-10-27 DIAGNOSIS — F43.21 GRIEF REACTION: Primary | ICD-10-CM

## 2021-10-27 PROCEDURE — 90834 PSYTX W PT 45 MINUTES: CPT | Mod: TEL | Performed by: SOCIAL WORKER

## 2021-10-27 ASSESSMENT — ANXIETY QUESTIONNAIRES
IF YOU CHECKED OFF ANY PROBLEMS ON THIS QUESTIONNAIRE, HOW DIFFICULT HAVE THESE PROBLEMS MADE IT FOR YOU TO DO YOUR WORK, TAKE CARE OF THINGS AT HOME, OR GET ALONG WITH OTHER PEOPLE: NOT DIFFICULT AT ALL
3. WORRYING TOO MUCH ABOUT DIFFERENT THINGS: NOT AT ALL
5. BEING SO RESTLESS THAT IT IS HARD TO SIT STILL: NOT AT ALL
7. FEELING AFRAID AS IF SOMETHING AWFUL MIGHT HAPPEN: NOT AT ALL
GAD7 TOTAL SCORE: 0
6. BECOMING EASILY ANNOYED OR IRRITABLE: NOT AT ALL
1. FEELING NERVOUS, ANXIOUS, OR ON EDGE: NOT AT ALL
2. NOT BEING ABLE TO STOP OR CONTROL WORRYING: NOT AT ALL

## 2021-10-27 ASSESSMENT — PATIENT HEALTH QUESTIONNAIRE - PHQ9
SUM OF ALL RESPONSES TO PHQ QUESTIONS 1-9: 0
5. POOR APPETITE OR OVEREATING: NOT AT ALL

## 2021-10-27 NOTE — PROGRESS NOTES
"                                             Progress Note    Patient Name: Laine Sharma  Date:10/27/2021         Service Type: Individual      Session Start Time: 9:08  Session End Time: 9:58     Session Length: 50 min    Session #: 23    Attendees: Client    Service Modality:  Phone Visit:[phone]      Provider verified identity through the following two step process.  Patient provided:  Patient address and Patient is known previously to provider    The patient has been notified of the following:      \"We have found that certain health care needs can be provided without the need for a face to face visit.  This service lets us provide the care you need with a phone conversation.       I will have full access to your [facility] medical record during this entire phone call.   I will be taking notes for your medical record.      Since this is like an office visit, we will bill your insurance company for this service.       There are potential benefits and risks of telephone visits (e.g. limits to patient confidentiality) that differ from in-person visits.?Confidentiality still applies for telephone services, and nobody will record the visit.  It is important to be in a quiet, private space that is free of distractions (including cell phone or other devices) during the visit.??      If during the course of the call I believe a telephone visit is not appropriate, you will not be charged for this service\"     Consent has been obtained for this service by care team member: Yes      Treatment Plan Last Reviewed: 10/27/2021   PHQ-9 / CHAU-7 : 0/0    DATA  Interactive Complexity: No  Crisis: No       Progress Since Last Session (Related to Symptoms / Goals / Homework):   Symptoms: Reports it is difficult to calm down during certain hours like evenings and other special moments she had with .   Reports her thumb is still better since started to get the cortisone shots. Her evenings are still rough on her due " to being alone.     Homework: Partially completed. Continues to express sadness related to 's death.Has tried to follow the change  discussed in previous sessions. She notes some improvement but still evenings are tough on her.  She still is waiting for the results from application for [employer]. Has been spending sometime with her neighbor friend and cat.    Episode of Care Goals: Satisfactory progress - ACTION (Actively working towards change); Intervened by reinforcing change plan / affirming steps taken . Reports some motivation to do things.  Reports interest to try new things. continues to try new tips to have a decent evening.     Current / Ongoing Stressors and Concerns: Patient reports she has been feeling well except her thumb is once again hurting. She also continues to have some moments of sadness which are specifically evenings. She is still waiting for the answer from her [employer] application. This was completed and returned in with this writer's assister early this month.  Patient continues to have some issues with her family in [place] related to the [place] [employer] she was given as a gift from her brother some 15 years ago. Apparently her niece has been pushing patient's brother to reclaim it. Patient believes he is being manipulated by his daughter since he is now disabled. She sees no reason to want what he has gifted back.  Patient has been feeling sad and uncomfortable about this. Over all she is doing well with the rest of the family. She gets along well with her sister in law and her 's son. She has a couple of friends she communicate with and she continues to attend Shinto with her neighbors. Will start a grief support once [employer] mobility is available. Has decided to stay in [place] during Winter as she can not separate from her cat and won't travel with her. Brother in [place] hates cats.    Treatment Objective(s) Addressed in This Session:   increase understanding of steps in the  grief process. Patient needs ongoing support and reminder about the steps of grief, normalizing her feelings and offering empathy.     Intervention:   Solution Focused: Keep working on the following distraction activities to reduce loneliness and sadness. These were revisited and reinforced today.1. Take bath at night versus morning 2.  Walk in the morning and evening a day for at least 30 minutes each time. 3.Do not skip meals 4. Keep your devotion time 5. Work on your puzzles for at least 45 min every day. 6.Do meditation using your 5 senses. 7. Call your friend or a family member of our choice. 8. Watch TV or listen to [employer]- she is yet to start listening to this radio station.      ASSESSMENT: Current Emotional / Mental Status (status of significant symptoms):   Risk status (Self / Other harm or suicidal ideation)   Patient denies current fears or concerns for personal safety.   Patient denies current or recent suicidal ideation or behaviors.   Patient denies current or recent homicidal ideation or behaviors.   Patient denies current or recent self injurious behavior or ideation.   Patient denies other safety concerns.   Patient reports there has been no change in risk factors since their last session.     Patient reports there has been no change in protective factors since their last session.     Recommended that patient call 911 or go to the local ED should there be a change in any of these risk factors.     Appearance:   Appropriate  not seen, not assessed    Eye Contact:   not seen, not assessed    Psychomotor Behavior: Normal    Attitude:   Cooperative    Orientation:   Person Place Time Situation   Speech    Rate / Production: Normal/ Responsive    Volume:  Normal    Mood:    Not assessed, not seen   Affect:    Appropriate    Thought Content:  Clear    Thought Form:  Coherent  Logical    Insight:    Good      Medication Review:   No current psychiatric medications prescribed     Medication  Compliance:   Yes     Changes in Health Issues:   None reported     Chemical Use Review:   Substance Use: Chemical use reviewed, no active concerns identified      Tobacco Use: No current tobacco use.      Diagnosis:  1. Grief reaction      Collateral Reports Completed:   Not Applicable    PLAN: (Patient Tasks / Therapist Tasks / Other):   Patient talk to her friends/family members  when she feels sad for support.  Patient will keep up with her walks to distract herself and stay in shape  Patient will restructure her day using different options discussed to reduce loneliness she experiences in the evening.   Patient will call her PCP office about her Thumb     Speciose Sinyigaya, LICSW  __________________________________________________________________  Treatment Plan    Patient's Name: Laine Sharma  YOB: 1933    Date: 10/27/2021    DSM5 Diagnoses:   1. Grief reaction    2. Adjustment disorder with anxiety      Psychosocial / Contextual Factors: Lost  late last year. Pain in the  Thumb. Family issues.     WHODAS: 12    Referral / Collaboration:  No referral was needed today.     Anticipated number of session or this episode of care: 12    MeasurableTreatment Goal(s) related to diagnosis / functional impairment(s)  Goal 1: Patient will talk to her friend or sister daily for suppot    I will know I've met my goal when I don't cry every time I am alone.      Objective #A (Patient Action)    Patient will talk to at least two others about losses and coping.  Status: Continued - Date(s): 10/27/2021    Intervention(s)  Therapist will assign homework : go to Buddhist with neighbor friend.    Objective #B  Patient will increase understanding of steps in the grief process.  Status: Continued - Date(s): 10/27/2021    Intervention(s)  Therapist will teach emotional regulation skills. asisting patient to create, collect memories about .    Objective #C  Patient will identify 3 strategies to more  effectively address stressors.  Status: Continued - Date(s): 10/27/2021    Intervention(s)  Therapist will Continue to assist patient with looking for open grief suppot near her home. .    Patient has reviewed and agreed to the above plan.    JOAQUIM Newell  October 27, 2021

## 2021-10-28 ENCOUNTER — OFFICE VISIT (OUTPATIENT)
Dept: FAMILY MEDICINE | Facility: CLINIC | Age: 86
End: 2021-10-28
Payer: COMMERCIAL

## 2021-10-28 VITALS
TEMPERATURE: 98.8 F | OXYGEN SATURATION: 97 % | BODY MASS INDEX: 31.14 KG/M2 | WEIGHT: 149 LBS | DIASTOLIC BLOOD PRESSURE: 76 MMHG | SYSTOLIC BLOOD PRESSURE: 119 MMHG | HEART RATE: 100 BPM

## 2021-10-28 DIAGNOSIS — M18.12 ARTHRITIS OF CARPOMETACARPAL (CMC) JOINT OF LEFT THUMB: ICD-10-CM

## 2021-10-28 DIAGNOSIS — F43.21 ADJUSTMENT DISORDER WITH DEPRESSED MOOD: Primary | ICD-10-CM

## 2021-10-28 PROCEDURE — 99213 OFFICE O/P EST LOW 20 MIN: CPT | Performed by: FAMILY MEDICINE

## 2021-10-28 RX ORDER — ESCITALOPRAM OXALATE 5 MG/1
5 TABLET ORAL DAILY
Qty: 30 TABLET | Refills: 3 | Status: SHIPPED | OUTPATIENT
Start: 2021-10-28

## 2021-10-28 ASSESSMENT — ANXIETY QUESTIONNAIRES: GAD7 TOTAL SCORE: 0

## 2021-10-28 NOTE — ASSESSMENT & PLAN NOTE
about 1 year ago.  Still with tearfulness/sadness/loneliness at night.  Working with therapist  Discussed option medication and she is reluctantly accepting  Lexapro 5 mg/day.  Discussed potential side effects, follow-up 1 month.

## 2021-10-28 NOTE — PROGRESS NOTES
I spent over 15 minutes spent, greater than 50% of this counseling regarding following issues:    Arthritis of carpometacarpal (CMC) joint of left thumb  Reviewed note from Dr. Benton  Corticosteroid injection done at the end of August.  Apparently no benefit.  Discussed options including ongoing splinting with Tylenol  Referral back to Dr. Benton for consideration of repeat injection or surgery    Plan wearing splint when using thumb, increasing Tylenol to up to 4 pills/day.  Follow-up if not working out.    Adjustment disorder with depressed mood    about 1 year ago.  Still with tearfulness/sadness/loneliness at night.  Working with therapist  Discussed option medication and she is reluctantly accepting  Lexapro 5 mg/day.  Discussed potential side effects, follow-up 1 month.       Pt presented for:  chief complaint  Patient presents for thumb pain in the left thumb when she uses it.  Did not remember seeing me orthopedic doctor or getting injection.    Ongoing sadness.    Patient thinks she had both flu shot and the Covid booster at [facility].

## 2021-10-28 NOTE — ASSESSMENT & PLAN NOTE
Reviewed note from Dr. Benton  Corticosteroid injection done at the end of August.  Apparently no benefit.  Discussed options including ongoing splinting with Tylenol  Referral back to Dr. Benton for consideration of repeat injection or surgery    Plan wearing splint when using thumb, increasing Tylenol to up to 4 pills/day.  Follow-up if not working out.

## 2021-11-03 ENCOUNTER — PATIENT OUTREACH (OUTPATIENT)
Dept: NURSING | Facility: CLINIC | Age: 86
End: 2021-11-03

## 2021-11-03 ENCOUNTER — TELEPHONE (OUTPATIENT)
Dept: CARE COORDINATION | Facility: CLINIC | Age: 86
End: 2021-11-03

## 2021-11-03 NOTE — PROGRESS NOTES
11/3/2021  Clinic Care Coordination Contact    Community Health Worker Follow Up    Care Gaps:     Health Maintenance Due   Topic Date Due     ZOSTER IMMUNIZATION (2 of 3) 03/15/2012     INFLUENZA VACCINE (1) 09/01/2021     MEDICARE ANNUAL WELLNESS VISIT  09/03/2021     COVID-19 Vaccine (3 - Booster for Pfizer series) 09/13/2021       Care Gaps Last addressed on 11-3-21 will wait for the clinic to call regarding scheduling COVID-19- booster    Goals:   Goals Addressed as of 11/3/2021 at 4:43 PM                    Today    9/7/21       Other (pt-stated)   70%  90%    Added 7/20/21 by Brian Bojorquez      Goal Statement: I want to connect with day time grief support groups close to my home (grief,  recently in memory care) within 3 months  Date Goal set: updated 7-20-21 Updated 11-3-21  Barriers: Unsure of resources  Strengths: Children support  Date to Achieve By: 1-2021  Patient expressed understanding of goal: yes  Action steps to achieve this goal:  1. I am not able to attend grief support group in the evening at [facility] preferred day time or telephone  2. I will call Antione Ragsdale Facilitator [phone] at [facility] to check if there any day time support group close to home.   3. I will to from [facility] Team of other resources in the community.  3. I will update [facility] team at outreach.    Resources Grief Support Group  [email or web address]://www.griefshare.org  [facility] group  Mondays at 6:30 pm   Andie Paredes Volunteer facilitator  [phone]  [place] for this group   Next meeting   Monday, May 03, 2021   6:30 pm - 8:30 pm View meeting schedule   Group location  [address] [address]       Updated: 11-3-21 AL              Other (pt-stated)   30%  30%    Added 7/20/21 by Brian Bojorquez      Goal Statement: I would like support to connect to transportation resources in [facility] within in the next 2 months.  Date Goal set:  6/4/2021 updated: 11-3-21  Barriers: limited transportation, not able to drive  Strengths: support from family member (Ismael)  Date to Achieve By: 1-2021  Patient expressed understanding of goal: Yes  Action steps to achieve this goal:  1. I will wait to get information in the mail from [facility] to see if they are a good option for Transportation  2. I will review any transportation resources from      Updated: 11-3-21 AL          Other (pt-stated)         Added 11/3/21 by Brian Bojorquez      Goal Statement: I would like information if am eligible to get the COVID -19 booster within 2 months  Date goal set: 11/3/2021  Measure of Success: complete COVID booster   Barriers: limited information about COVID booster  Strengths: support from [facility] team  Date to Achieve By: 1-2022   Patient expressed understanding of goal: Yes    Action steps to achieve this goal  1. I will wait until the doctor or clinic to call regarding scheduling for COVID-19 booster  2. I will update [facility] team.               Intervention and Education during outreach:   Called and spoke to patient and follow up on goals.  Patient reported:  -does not want to apply for [employer] because she still wants to drive. She can't drive at night time.  -can't go to the [facility] support group because it is at night can't drive at night.  She would like information on day time support group close to home .   They have day time support group  but it is in [place].  -would like to get booster shot questions if she eligible to get one or where to go to get it.  She had the Jose vaccine back in  March.    CHW sent telephone message to PCP Care Team [place] regarding COVID-19 booster.    Consult with SALOMÓN JENSEN about [facility] and transportation services and grief support group day time or telephone     CHW Follow up: Monthly    CHW Plan: Follow up on goals    CHW Next Follow Up: 12-6-21

## 2021-11-03 NOTE — TELEPHONE ENCOUNTER
RN spoke with patient and advised that we do not stock Jose and Jose. RN informed patient to call [facility] or [facility], to see if they are stocking J&J and if they have any in stock at the moment.     Melissa Velasquez RN   [facility]/Triage Nurse

## 2021-11-03 NOTE — TELEPHONE ENCOUNTER
11/3/2021  Patient would like to know if she is eligible to get COVID 19-booster.  She had COVID vaccine [facility]     Please call to advise

## 2021-11-17 ENCOUNTER — VIRTUAL VISIT (OUTPATIENT)
Dept: BEHAVIORAL HEALTH | Facility: HOSPITAL | Age: 86
End: 2021-11-17
Payer: COMMERCIAL

## 2021-11-17 DIAGNOSIS — F43.21 GRIEF REACTION: Primary | ICD-10-CM

## 2021-11-17 PROCEDURE — 90834 PSYTX W PT 45 MINUTES: CPT | Mod: 95 | Performed by: SOCIAL WORKER

## 2021-11-17 ASSESSMENT — ANXIETY QUESTIONNAIRES
GAD7 TOTAL SCORE: 0
7. FEELING AFRAID AS IF SOMETHING AWFUL MIGHT HAPPEN: NOT AT ALL
5. BEING SO RESTLESS THAT IT IS HARD TO SIT STILL: NOT AT ALL
IF YOU CHECKED OFF ANY PROBLEMS ON THIS QUESTIONNAIRE, HOW DIFFICULT HAVE THESE PROBLEMS MADE IT FOR YOU TO DO YOUR WORK, TAKE CARE OF THINGS AT HOME, OR GET ALONG WITH OTHER PEOPLE: NOT DIFFICULT AT ALL
3. WORRYING TOO MUCH ABOUT DIFFERENT THINGS: NOT AT ALL
6. BECOMING EASILY ANNOYED OR IRRITABLE: NOT AT ALL
2. NOT BEING ABLE TO STOP OR CONTROL WORRYING: NOT AT ALL
1. FEELING NERVOUS, ANXIOUS, OR ON EDGE: NOT AT ALL

## 2021-11-17 ASSESSMENT — PATIENT HEALTH QUESTIONNAIRE - PHQ9: 5. POOR APPETITE OR OVEREATING: NOT AT ALL

## 2021-11-17 NOTE — PROGRESS NOTES
"                                             Progress Note    Patient Name: Laine Sharma  Date:11/17/2021         Service Type: Individual      Session Start Time: 9:04  Session End Time: 9:50     Session Length: 46 min    Session #: 24     Attendees: Client    Service Modality:  Phone Visit:[phone]      Provider verified identity through the following two step process.  Patient provided:  Patient address and Patient is known previously to provider    The patient has been notified of the following:      \"We have found that certain health care needs can be provided without the need for a face to face visit.  This service lets us provide the care you need with a phone conversation.       I will have full access to your [facility] medical record during this entire phone call.   I will be taking notes for your medical record.      Since this is like an office visit, we will bill your insurance company for this service.       There are potential benefits and risks of telephone visits (e.g. limits to patient confidentiality) that differ from in-person visits.?Confidentiality still applies for telephone services, and nobody will record the visit.  It is important to be in a quiet, private space that is free of distractions (including cell phone or other devices) during the visit.??      If during the course of the call I believe a telephone visit is not appropriate, you will not be charged for this service\"     Consent has been obtained for this service by care team member: Yes      Treatment Plan Last Reviewed: 10/27/2021   PHQ-9 / CHAU-7 : 0/0    DATA  Interactive Complexity: No  Crisis: No       Progress Since Last Session (Related to Symptoms / Goals / Homework):   Symptoms: Reports it is difficult to calm down during certain hours like evenings and other special moments she had with .   Reports her thumb is hurting once again and it is slowing her down. Makes her think much about how she would not " have had to struggle using her thumb if  was still there.  Has an appt with her PCP to look at it again.      Homework: Partially completed. Continues to express sadness related to 's death.She continues to note some improvement but still evenings are tough on her.      Episode of Care Goals: Satisfactory progress - ACTION (Actively working towards change); Intervened by reinforcing change plan / affirming steps taken . Reports some motivation to do things.  Reports interest to try new things. continues to try new tips to have a decent evening.     Current / Ongoing Stressors and Concerns: Patient has been experiencing pain again in her thumb. She is in touch with her PCP. She will see him in 2 weeks. Continue to share sadness around her loss. Had some conversation with her . They have a  plan to pay her a visit to pray today. Her step daughter was over for several days from [place]. She find it helps that her step children have been showing a great support to her. She will be traveling on 12/22 for [place] to spend some time with her step daughter and family. Made an arrangement wit her friend to have her cat while traveling.  She already has a plan for Thanksgiving with the family here in the [facility].     Treatment Objective(s) Addressed in This Session:   increase understanding of steps in the grief process. Patient needs ongoing support and reminder about the steps of grief, normalizing her feelings and offering empathy.     Intervention:   Solution Focused: Reviewed to following plans and reinforced them as necessarily:  Keep working on the following distraction activities to reduce loneliness and sadness. These were revisited and reinforced today.1. Take bath at night versus morning 2.  Walk in the morning and evening a day for at least 30 minutes each time. 3.Do not skip meals 4. Keep your devotion time 5. Work on your puzzles for at least 45 min every day. 6.Do meditation using your 5 senses.  7. Call your friend or a family member of our choice. 8. Watch TV or listen to [employer]- she is yet to start listening to this radio station.      ASSESSMENT: Current Emotional / Mental Status (status of significant symptoms):   Risk status (Self / Other harm or suicidal ideation)   Patient denies current fears or concerns for personal safety.   Patient denies current or recent suicidal ideation or behaviors.   Patient denies current or recent homicidal ideation or behaviors.   Patient denies current or recent self injurious behavior or ideation.   Patient denies other safety concerns.   Patient reports there has been no change in risk factors since their last session.     Patient reports there has been no change in protective factors since their last session.     Recommended that patient call 911 or go to the local ED should there be a change in any of these risk factors.     Appearance:   Appropriate  not seen, not assessed    Eye Contact:   not seen, not assessed    Psychomotor Behavior: Normal    Attitude:   Cooperative    Orientation:   Person Place Time Situation   Speech    Rate / Production: Normal/ Responsive    Volume:  Normal    Mood:    Not assessed, not seen   Affect:    Appropriate    Thought Content:  Clear    Thought Form:  Coherent  Logical    Insight:    Good      Medication Review:   No current psychiatric medications prescribed     Medication Compliance:   Yes     Changes in Health Issues:   None reported     Chemical Use Review:   Substance Use: Chemical use reviewed, no active concerns identified      Tobacco Use: No current tobacco use.      Diagnosis:  1. Grief reaction      Collateral Reports Completed:   Not Applicable    PLAN: (Patient Tasks / Therapist Tasks / Other):   Patient talk to her friends/family members  when she feels sad for support.  Patient will keep up with her walks to distract herself and stay in shape  Patient will restructure her day using different options discussed to  reduce loneliness she experiences in the evening.   Patient will continue to follow the recommendation about her Thumb from her PCP to alleviate the pain.    Anjelicamaulikalyssa JOAQUIM Stafford  __________________________________________________________________  Treatment Plan    Patient's Name: Laine Sharma  YOB: 1933    Date: 10/27/2021    DSM5 Diagnoses:   1. Grief reaction    2. Adjustment disorder with anxiety      Psychosocial / Contextual Factors: Lost  late last year. Pain in the  Thumb. Family issues.     WHODAS: 12    Referral / Collaboration:  No referral was needed today.     Anticipated number of session or this episode of care: 12    MeasurableTreatment Goal(s) related to diagnosis / functional impairment(s)  Goal 1: Patient will talk to her friend or sister daily for suppot    I will know I've met my goal when I don't cry every time I am alone.      Objective #A (Patient Action)    Patient will talk to at least two others about losses and coping.  Status: Continued - Date(s): 10/27/2021    Intervention(s)  Therapist will assign homework : go to [employer] with neighbor friend.    Objective #B  Patient will increase understanding of steps in the grief process.  Status: Continued - Date(s): 10/27/2021    Intervention(s)  Therapist will teach emotional regulation skills. asisting patient to create, collect memories about .    Objective #C  Patient will identify 3 strategies to more effectively address stressors.  Status: Continued - Date(s): 10/27/2021    Intervention(s)  Therapist will Continue to assist patient with looking for open grief suppot near her home.     Patient has reviewed and agreed to the above plan.    Dominicalyssa JOAQUIM Stafford  October 27, 2021

## 2021-11-18 ASSESSMENT — ANXIETY QUESTIONNAIRES: GAD7 TOTAL SCORE: 0

## 2021-11-18 ASSESSMENT — PATIENT HEALTH QUESTIONNAIRE - PHQ9: SUM OF ALL RESPONSES TO PHQ QUESTIONS 1-9: 0

## 2021-11-30 ENCOUNTER — OFFICE VISIT (OUTPATIENT)
Dept: FAMILY MEDICINE | Facility: CLINIC | Age: 86
End: 2021-11-30
Payer: COMMERCIAL

## 2021-11-30 VITALS
SYSTOLIC BLOOD PRESSURE: 164 MMHG | BODY MASS INDEX: 31.35 KG/M2 | TEMPERATURE: 96.8 F | DIASTOLIC BLOOD PRESSURE: 78 MMHG | RESPIRATION RATE: 18 BRPM | WEIGHT: 150 LBS | HEART RATE: 66 BPM

## 2021-11-30 DIAGNOSIS — M18.12 ARTHRITIS OF CARPOMETACARPAL (CMC) JOINT OF LEFT THUMB: Primary | ICD-10-CM

## 2021-11-30 DIAGNOSIS — F43.21 ADJUSTMENT DISORDER WITH DEPRESSED MOOD: ICD-10-CM

## 2021-11-30 DIAGNOSIS — I10 ESSENTIAL HYPERTENSION: ICD-10-CM

## 2021-11-30 PROCEDURE — 0004A PR COVID VAC PFIZER DIL RECON 30 MCG/0.3 ML IM: CPT | Performed by: FAMILY MEDICINE

## 2021-11-30 PROCEDURE — 91300 PR COVID VAC PFIZER DIL RECON 30 MCG/0.3 ML IM: CPT | Performed by: FAMILY MEDICINE

## 2021-11-30 PROCEDURE — 99213 OFFICE O/P EST LOW 20 MIN: CPT | Mod: 25 | Performed by: FAMILY MEDICINE

## 2021-11-30 NOTE — PROGRESS NOTES
Assessment/ Plan  Adjustment disorder with depressed mood  Never picked up Lexapro.  Sounds like there is not much in the way of changes but she thinks she is doing well enough to not need antidepressant medication.  Encouraged her about the low side effect profile/safety of the medication.  She will continue to manage on her own, encouraged her to remain active both socially and physically.  Further discussion if she wishes.    Arthritis of carpometacarpal (CMC) joint of left thumb  Has seen hand surgery for this, has splint, joint injection not effective.  Offered referral back to hand surgery, encouraged Tylenol.    Essential hypertension  Blood pressure elevated on both checks today.  But patient has pretty well documented whitecoat hypertension from the past.  We will continue same medications given this and given her age.  Amlodipine 10 mg, losartan 100 mg and hydrochlorothiazide 25.       Pfizer booster given    Subjective  CC:  chief complaint  HPI:  88-year-old  Here for follow-up on adjustment disorder with depressed mood.  Prescribed Lexapro on last visit 10/28 but never picked this up.    Also felt pain as described above.  States that she is reluctant to use Tylenol for pain.  She does indicate that it helped significantly.  Has thumb spica splint as well.  PFSH:  Patient Active Problem List   Diagnosis     Diverticulosis     Esophageal reflux     Osteoarthritis     Hyperlipidemia     Essential hypertension     Healthcare maintenance     Pes anserine bursitis     Nodule of finger of left hand     Grief reaction     Asymptomatic menopausal state      Adjustment disorder with depressed mood     Rotator cuff tendonitis, right     Arthritis of carpometacarpal (CMC) joint of left thumb     acetaminophen (TYLENOL) 325 MG tablet, [ACETAMINOPHEN (TYLENOL) 325 MG TABLET] Take 650 mg by mouth every 6 (six) hours as needed for pain.  amLODIPine (NORVASC) 10 MG tablet, [AMLODIPINE (NORVASC) 10 MG TABLET] Take 1  tablet by mouth once daily  escitalopram (LEXAPRO) 5 MG tablet, Take 1 tablet (5 mg) by mouth daily  hydroCHLOROthiazide (HYDRODIURIL) 25 MG tablet, [HYDROCHLOROTHIAZIDE (HYDRODIURIL) 25 MG TABLET] Take 1 tablet (25 mg total) by mouth daily.  losartan (COZAAR) 100 MG tablet, [LOSARTAN (COZAAR) 100 MG TABLET] Take 1 tablet (100 mg total) by mouth daily.    No current facility-administered medications on file prior to visit.       History   Smoking Status     Never Smoker   Smokeless Tobacco     Never Used     Social History     Social History Narrative    Patient is  to Dom  Moved to [facility] in 2016       Patient Care Team:  Abdulkadir Rodriguez MD as PCP - Brian Beckman as Community Health Worker (Primary Care - CC)  Ugo Garcia LICSW as Lead Care Coordinator (Primary Care - CC)  Abdulkadir Rodriguez MD as Assigned PCP  ROS  As above      Objective  Physical Exam  Vitals:    11/30/21 0945 11/30/21 1000 11/30/21 1031   BP: (!) 162/77 (!) 150/76 (!) 164/78   BP Location: Right arm Right arm Right arm   Patient Position: Sitting Sitting Sitting   Cuff Size: Adult Large Adult Large Adult Large   Pulse: 66     Resp: 18     Temp: 96.8  F (36  C)     Weight: 68 kg (150 lb)       Body mass index is 31.35 kg/m .  Gen- alert, oriented/ appropriately responsive  HEENT- normal cephalic, atraumatic.   Chest- Normal inspiration and expiration.    Clear to ascultation.    No chest wall deformity or scar.  CV- Heart regular rate and rhythm  normal tones, no murmurs   No gallops or rubs.  Ext- appear well perfused, no edema  Skin- warm and dry,   no visualized rash      Diagnostics:   None      Please note: Voice recognition software was used in this dictation.  It may therefore contain typographical errors.

## 2021-11-30 NOTE — ASSESSMENT & PLAN NOTE
Blood pressure elevated on both checks today.  But patient has pretty well documented whitecoat hypertension from the past.  We will continue same medications given this and given her age.  Amlodipine 10 mg, losartan 100 mg and hydrochlorothiazide 25.

## 2021-11-30 NOTE — ASSESSMENT & PLAN NOTE
Never picked up Lexapro.  Sounds like there is not much in the way of changes but she thinks she is doing well enough to not need antidepressant medication.  Encouraged her about the low side effect profile/safety of the medication.  She will continue to manage on her own, encouraged her to remain active both socially and physically.  Further discussion if she wishes.

## 2021-11-30 NOTE — ASSESSMENT & PLAN NOTE
Has seen hand surgery for this, has splint, joint injection not effective.  Offered referral back to hand surgery, encouraged Tylenol.   Health Maintenance Due   Topic Date Due   â¢ DTaP/Tdap/Td Vaccine (1 - Tdap) 06/15/1958   â¢ Medicare Wellness Visit  09/20/2020       Patient is due for topics listed above, she wishes to proceed with [address] [place] [address] - [facility] Wellness Visit), but is not proceeding with Immunization(s) Dtap/Tdap/Td at this time.

## 2021-12-01 ENCOUNTER — VIRTUAL VISIT (OUTPATIENT)
Dept: BEHAVIORAL HEALTH | Facility: HOSPITAL | Age: 86
End: 2021-12-01
Payer: COMMERCIAL

## 2021-12-01 DIAGNOSIS — F43.22 ADJUSTMENT DISORDER WITH ANXIETY: Primary | ICD-10-CM

## 2021-12-01 PROCEDURE — 90834 PSYTX W PT 45 MINUTES: CPT | Mod: TEL | Performed by: SOCIAL WORKER

## 2021-12-01 ASSESSMENT — ANXIETY QUESTIONNAIRES
IF YOU CHECKED OFF ANY PROBLEMS ON THIS QUESTIONNAIRE, HOW DIFFICULT HAVE THESE PROBLEMS MADE IT FOR YOU TO DO YOUR WORK, TAKE CARE OF THINGS AT HOME, OR GET ALONG WITH OTHER PEOPLE: NOT DIFFICULT AT ALL
1. FEELING NERVOUS, ANXIOUS, OR ON EDGE: NOT AT ALL
5. BEING SO RESTLESS THAT IT IS HARD TO SIT STILL: NOT AT ALL
7. FEELING AFRAID AS IF SOMETHING AWFUL MIGHT HAPPEN: NOT AT ALL
GAD7 TOTAL SCORE: 0
2. NOT BEING ABLE TO STOP OR CONTROL WORRYING: NOT AT ALL
6. BECOMING EASILY ANNOYED OR IRRITABLE: NOT AT ALL
3. WORRYING TOO MUCH ABOUT DIFFERENT THINGS: NOT AT ALL

## 2021-12-01 ASSESSMENT — PATIENT HEALTH QUESTIONNAIRE - PHQ9: 5. POOR APPETITE OR OVEREATING: NOT AT ALL

## 2021-12-01 NOTE — PROGRESS NOTES
"                                             Progress Note    Patient Name: Laine Sharma  Date:12/01/2021         Service Type: Individual      Session Start Time: 9:09  Session End Time: 9:58     Session Length: 47 min    Session #: 25     Attendees: Client    Service Modality:  Phone Visit:[phone]      Provider verified identity through the following two step process.  Patient provided:  Patient address and Patient is known previously to provider    The patient has been notified of the following:      \"We have found that certain health care needs can be provided without the need for a face to face visit.  This service lets us provide the care you need with a phone conversation.       I will have full access to your [facility] medical record during this entire phone call.   I will be taking notes for your medical record.      Since this is like an office visit, we will bill your insurance company for this service.       There are potential benefits and risks of telephone visits (e.g. limits to patient confidentiality) that differ from in-person visits.?Confidentiality still applies for telephone services, and nobody will record the visit.  It is important to be in a quiet, private space that is free of distractions (including cell phone or other devices) during the visit.??      If during the course of the call I believe a telephone visit is not appropriate, you will not be charged for this service\"     Consent has been obtained for this service by care team member: Yes      Treatment Plan Last Reviewed: 10/27/2021   PHQ-9 / CHAU-7 : 0/0    DATA  Interactive Complexity: No  Crisis: No       Progress Since Last Session (Related to Symptoms / Goals / Homework):   Symptoms: Reports it is difficult to calm down during certain hours like evenings and other special moments she had with .  Pain in the thumb that turned out to have arthritis. She was seen yesterday by her PCP.     Homework: Achieved / " completed to satisfaction. Patient notes some physical limitation to do things. Though she has been in touch with family and she is following through recommendations from her providers.     Episode of Care Goals: Satisfactory progress - ACTION (Actively working towards change); Intervened by reinforcing change plan / affirming steps taken . Reports some motivation to do things. Continues to try new tips to have a decent evening. Continues to express sadness related to 's death in therapy. She continues to note some improvement but still evenings are tough on her.     Current / Ongoing Stressors and Concerns: Patient reported no change since the last visit. Had a good time on Thanksgiving with the family. Had a visit from her . Has been in touch with family including the one brother in [place]. He has health issues that concern the patient. She is not able to communicate with him freely which bring much sadness. She is worried about losing him. Agreed to give it a try to communicate to him through the niece. Talks to family, does her devotion, and play with her cat. Still has her plan to  travel on 12/22 for [place] to spend some time with her step daughter and family. She got her booster yesterday and feels she is ready to go. Her friend will stay with her cat while traveling.  Has been trying to read books and news papers in the evenings. Has a plan to do some holiday shopping today.     Treatment Objective(s) Addressed in This Session:   increase understanding of steps in the grief process. Patient needs ongoing support and reminder about the steps of grief, normalizing her feelings and offering empathy.     Intervention:   Solution Focused: Reviewed to following plans and reinforced them as necessarily. Actively listened to her concerns. Offered empathy and encouraging to keep working on the following distraction activities to reduce loneliness and sadness. These were revisited and reinforced today.1. Take  bath at night versus morning 2.  Walk in the morning and evening a day for at least 30 minutes each time. 3.Do not skip meals 4. Keep your devotion time 5. Work on your puzzles for at least 45 min every day. 6.Do meditation using your 5 senses. 7. Call your friend or a family member of our choice. 8. Watch TV or listen to [employer]- she is yet to start listening to this radio station.      ASSESSMENT: Current Emotional / Mental Status (status of significant symptoms):   Risk status (Self / Other harm or suicidal ideation)   Patient denies current fears or concerns for personal safety.   Patient denies current or recent suicidal ideation or behaviors.   Patient denies current or recent homicidal ideation or behaviors.   Patient denies current or recent self injurious behavior or ideation.   Patient denies other safety concerns.   Patient reports there has been no change in risk factors since their last session.     Patient reports there has been no change in protective factors since their last session.     Recommended that patient call 911 or go to the local ED should there be a change in any of these risk factors.     Appearance:   Appropriate  not seen, not assessed    Eye Contact:   not seen, not assessed    Psychomotor Behavior: Normal    Attitude:   Cooperative    Orientation:   Person Place Time Situation   Speech    Rate / Production: Normal/ Responsive    Volume:  Normal    Mood:    Not assessed, not seen   Affect:    Appropriate    Thought Content:  Clear    Thought Form:  Coherent  Logical    Insight:    Good      Medication Review:   No current psychiatric medications prescribed     Medication Compliance:   Yes     Changes in Health Issues:   None reported     Chemical Use Review:   Substance Use: Chemical use reviewed, no active concerns identified      Tobacco Use: No current tobacco use.      Diagnosis:  1. Adjustment disorder with anxiety      Collateral Reports Completed:   Not Applicable    PLAN: (Patient  Tasks / Therapist Tasks / Other):   Patient talk to her friends/family members  when she feels sad for support.  Patient will keep up with her walks to distract herself and stay in shape  Patient will restructure her day using different options discussed to reduce loneliness she experiences in the evening.   Patient will continue to follow the recommendation about her Thumb from her PCP to alleviate the pain.    Rosa Stafford, LICSW   __________________________________________________________________  Treatment Plan    Patient's Name: Laine Sharma  YOB: 1933    Date: 10/27/2021    DSM5 Diagnoses:   1. Grief reaction    2. Adjustment disorder with anxiety      Psychosocial / Contextual Factors: Lost  late last year. Pain in the  Thumb. Family issues.     WHODAS: 12    Referral / Collaboration:  No referral was needed today.     Anticipated number of session or this episode of care: 12    MeasurableTreatment Goal(s) related to diagnosis / functional impairment(s)  Goal 1: Patient will talk to her friend or sister daily for suppot    I will know I've met my goal when I don't cry every time I am alone.      Objective #A (Patient Action)    Patient will talk to at least two others about losses and coping.  Status: Continued - Date(s): 10/27/2021    Intervention(s)  Therapist will assign homework : go to [employer]oration[employer] with neighbor friend.    Objective #B  Patient will increase understanding of steps in the grief process.  Status: Continued - Date(s): 10/27/2021    Intervention(s)  Therapist will teach emotional regulation skills. asisting patient to create, collect memories about .    Objective #C  Patient will identify 3 strategies to more effectively address stressors.  Status: Continued - Date(s): 10/27/2021    Intervention(s)  Therapist will Continue to assist patient with looking for open grief suppot near her home.     Patient has reviewed and agreed to the above plan.    Rosa Stafford,  LICSW  October 27, 2021

## 2021-12-02 ASSESSMENT — PATIENT HEALTH QUESTIONNAIRE - PHQ9: SUM OF ALL RESPONSES TO PHQ QUESTIONS 1-9: 0

## 2021-12-02 ASSESSMENT — ANXIETY QUESTIONNAIRES: GAD7 TOTAL SCORE: 0

## 2021-12-06 ENCOUNTER — PATIENT OUTREACH (OUTPATIENT)
Dept: NURSING | Facility: CLINIC | Age: 86
End: 2021-12-06
Payer: COMMERCIAL

## 2021-12-06 NOTE — PROGRESS NOTES
12/6/2021  Clinic Care Coordination Contact    Community Health Worker Follow Up    Care Gaps:     Health Maintenance Due   Topic Date Due     ZOSTER IMMUNIZATION (3 of 3) 12/02/2020     MEDICARE ANNUAL WELLNESS VISIT  09/03/2021       Care Gaps Last addressed on 12-6-21 will discuss care gaps at next office visit or call to schedule    Goals:   Goals Addressed as of 12/6/2021 at 3:47 PM                    Today    11/3/21       Other (pt-stated)   50%  70%    Added 7/20/21 by Brian Bojorquez      Goal Statement: I want to connect with day time grief support groups close to my home (grief,  recently in memory care) within 3 months  Date Goal set: updated 7-20-21 Updated 11-3-21  Barriers: Unsure of resources  Strengths: Children support  Date to Achieve By: 1-2021  Patient expressed understanding of goal: yes  Action steps to achieve this goal:  1. I [phone]  with [facility] for group or  telephone support.       Updated: 12-6-21 AL              Other (pt-stated)   30%  30%    Added 7/20/21 by Brian Bojorquez      Goal Statement: I would like support to connect to transportation resources in [facility] within in the next 2 months.  Date Goal set: 6/4/2021 updated: 11-3-21  Barriers: limited transportation, not able to drive  Strengths: support from family member (Ismael)  Date to Achieve By: 1-2021  Patient expressed understanding of goal: Yes  Action steps to achieve this goal:  1. I talked to [employer] but am not qualify because I have a car.  2. I will wait to hear from CC Team of any transportation resources from      Updated: 12-6-21 AL             Intervention and Education during outreach:   Called and spoke to patient and follow up on goals  Patient reported:  -got the COVID booster the at clinic 11-30-21.  -would to know travel restriction or requirement to travel to [place].  -leaving [id number] to visit sister in law in [place] for 1 week.  -called  [employer] they said she is not eligible for [employer] because she has a car.  CHW sent message to [facility] of any other transportation resources.  Suggested to make copy of COVID vaccine card to carry with and check with airline of rules and requirements for traveling to [place].      Conference call with patient to [facility] regarding PEARLS Program  Left voice message with Elda Rosa Isela [phone] to call patient back.  Provided Elda's contact number       [facility] through [facility]-supports adults 55+ who are suffering from low mood or depression. It is partially funded by [employer] and [employer] so its free to patients. Contact [facility] Counselor [phone] for referral. [facility] counselors will meet with an older adult either by phone, Zoom or in their homes to provide eight FREE one-on-one support sessions over the course of five to six months. After the sessions are complete, four additional monthly follow-up phone sessions are available. JFS: [place], [place] - Program to Encourage Active and Rewarding Lives (PEARLS) (jfssp.org)       Routed to [facility] regarding transportation information     CHW Follow up: Monthly    CHW Plan: Follow up on goals    CHW Next Follow Up: 1-11-22

## 2021-12-15 ENCOUNTER — TELEPHONE (OUTPATIENT)
Dept: NURSING | Facility: CLINIC | Age: 86
End: 2021-12-15
Payer: COMMERCIAL

## 2021-12-15 ENCOUNTER — VIRTUAL VISIT (OUTPATIENT)
Dept: BEHAVIORAL HEALTH | Facility: HOSPITAL | Age: 86
End: 2021-12-15
Payer: COMMERCIAL

## 2021-12-15 ENCOUNTER — TELEPHONE (OUTPATIENT)
Dept: FAMILY MEDICINE | Facility: CLINIC | Age: 86
End: 2021-12-15
Payer: COMMERCIAL

## 2021-12-15 DIAGNOSIS — F43.21 GRIEF REACTION: Primary | ICD-10-CM

## 2021-12-15 PROCEDURE — 90834 PSYTX W PT 45 MINUTES: CPT | Mod: TEL,95 | Performed by: SOCIAL WORKER

## 2021-12-15 ASSESSMENT — ANXIETY QUESTIONNAIRES
3. WORRYING TOO MUCH ABOUT DIFFERENT THINGS: NOT AT ALL
5. BEING SO RESTLESS THAT IT IS HARD TO SIT STILL: NOT AT ALL
6. BECOMING EASILY ANNOYED OR IRRITABLE: NOT AT ALL
2. NOT BEING ABLE TO STOP OR CONTROL WORRYING: NOT AT ALL
GAD7 TOTAL SCORE: 0
7. FEELING AFRAID AS IF SOMETHING AWFUL MIGHT HAPPEN: NOT AT ALL
IF YOU CHECKED OFF ANY PROBLEMS ON THIS QUESTIONNAIRE, HOW DIFFICULT HAVE THESE PROBLEMS MADE IT FOR YOU TO DO YOUR WORK, TAKE CARE OF THINGS AT HOME, OR GET ALONG WITH OTHER PEOPLE: NOT DIFFICULT AT ALL
1. FEELING NERVOUS, ANXIOUS, OR ON EDGE: NOT AT ALL

## 2021-12-15 ASSESSMENT — PATIENT HEALTH QUESTIONNAIRE - PHQ9
SUM OF ALL RESPONSES TO PHQ QUESTIONS 1-9: 0
5. POOR APPETITE OR OVEREATING: NOT AT ALL

## 2021-12-15 NOTE — TELEPHONE ENCOUNTER
Telephone Call:     Pt calling to find out the dates of her pfizer vaccines because she is planning on traveling to [place] soon. Pt stated that she does not have a vaccine card with this record. Pt was given medical records phone number and also transferred to medical records to find out if they can mail her a record of her immunizations.    Delores Hill RN  [facility] Nurse Advisor 10:33 AM 12/15/2021

## 2021-12-15 NOTE — PROGRESS NOTES
"                                             Progress Note    Patient Name: Laine Sharma  Date:12/15/2021         Service Type: Individual      Session Start Time: 9:04  Session End Time: 9:50     Session Length: 46 min    Session #: 26    Attendees: Client    Service Modality:  Phone Visit:[phone]      Provider verified identity through the following two step process.  Patient provided:  Patient address and Patient is known previously to provider    The patient has been notified of the following:      \"We have found that certain health care needs can be provided without the need for a face to face visit.  This service lets us provide the care you need with a phone conversation.       I will have full access to your [facility] medical record during this entire phone call.   I will be taking notes for your medical record.      Since this is like an office visit, we will bill your insurance company for this service.       There are potential benefits and risks of telephone visits (e.g. limits to patient confidentiality) that differ from in-person visits.?Confidentiality still applies for telephone services, and nobody will record the visit.  It is important to be in a quiet, private space that is free of distractions (including cell phone or other devices) during the visit.??      If during the course of the call I believe a telephone visit is not appropriate, you will not be charged for this service\"     Consent has been obtained for this service by care team member: Yes      Treatment Plan Last Reviewed: 10/27/2021   PHQ-9 / CHAU-7 : 0/0    DATA  Interactive Complexity: No  Crisis: No       Progress Since Last Session (Related to Symptoms / Goals / Homework):   Symptoms: reports feeling better this week as she is just focusing on packing to travel to [place] to visit her step daughter  .     Homework: Achieved / completed to satisfaction. Patient notes some physical limitation to do things. Though she has been " in touch with family and she is following through recommendations from her providers.     Episode of Care Goals: Satisfactory progress - ACTION (Actively working towards change); Intervened by reinforcing change plan / affirming steps taken . Reports some motivation to do things. Continues to try new tips to have a decent evening. Continues to express sadness related to 's death in therapy. She continues to note some improvement but still evenings are tough on her.     Current / Ongoing Stressors and Concerns: Patient reports she has been focusing on her trip preparation. So has not had much time to sit and think much. She is traveling to see her step daughter next week for [place]. Will return on 12/29. Patient is sorting out her needs to travel. She could not remember that she was given the covid Vaccine booster. Then noted she could not find the card. She and writer have a plan to connect with the PCP clinic to request a new card to travel with. She has made arrangement to leave her cat to her friend. Plans to  her prescription today. Will also inquire some brace that supports her thumb while traveling as she notes it is hurting much when she touches things. Her next visit is on 1/05/2022, after her return.     Treatment Objective(s) Addressed in This Session:   increase understanding of steps in the grief process. Patient needs ongoing support and reminder about the steps of grief, normalizing her feelings and offering empathy.     Intervention:   Solution Focused: Reviewed to following plans and reinforced them as necessarily. Actively listened to her concerns. Offered empathy and encouraging to keep working on the following distraction activities to reduce loneliness and sadness. These were revisited and reinforced today.1. Take bath at night versus morning 2.  Walk in the morning and evening a day for at least 30 minutes each time. 3.Do not skip meals 4. Keep your devotion time 5. Work on your  puzzles for at least 45 min every day. 6.Do meditation using your 5 senses. 7. Call your friend or a family member of our choice. 8. Watch TV or listen to [employer]- she is yet to start listening to this radio station. Also discussed the need to have her vaccination care while traveling.      ASSESSMENT: Current Emotional / Mental Status (status of significant symptoms):   Risk status (Self / Other harm or suicidal ideation)   Patient denies current fears or concerns for personal safety.   Patient denies current or recent suicidal ideation or behaviors.   Patient denies current or recent homicidal ideation or behaviors.   Patient denies current or recent self injurious behavior or ideation.   Patient denies other safety concerns.   Patient reports there has been no change in risk factors since their last session.     Patient reports there has been no change in protective factors since their last session.     Recommended that patient call 911 or go to the local ED should there be a change in any of these risk factors.     Appearance:   Appropriate  not seen, not assessed    Eye Contact:   not seen, not assessed    Psychomotor Behavior: Normal    Attitude:   Cooperative    Orientation:   Person Place Time Situation   Speech    Rate / Production: Normal/ Responsive    Volume:  Normal    Mood:    Not assessed, not seen   Affect:    Appropriate    Thought Content:  Clear    Thought Form:  Coherent  Logical    Insight:    Good      Medication Review:   No current psychiatric medications prescribed     Medication Compliance:   Yes     Changes in Health Issues:   None reported     Chemical Use Review:   Substance Use: Chemical use reviewed, no active concerns identified      Tobacco Use: No current tobacco use.      Diagnosis:  1. Grief reaction      Collateral Reports Completed:   Not Applicable    PLAN: (Patient Tasks / Therapist Tasks / Other):   Patient talk to her friends/family members  when she feels sad for  support.  Patient will keep up with her walks to distract herself and stay in shape  Patient will restructure her day using different options discussed to reduce loneliness she experiences in the evening.   Patient will continue return on 1/05 after her trip to [place] for the next visit.    Rosa Ndyia, [facility]   __________________________________________________________________  Treatment Plan    Patient's Name: Laine Sharma  YOB: 1933    Date: 10/27/2021    DSM5 Diagnoses:   1. Grief reaction    2. Adjustment disorder with anxiety      Psychosocial / Contextual Factors: Lost  late last year. Pain in the  Thumb. Family issues.     WHODAS: 12    Referral / Collaboration:  No referral was needed today.     Anticipated number of session or this episode of care: 12    MeasurableTreatment Goal(s) related to diagnosis / functional impairment(s)  Goal 1: Patient will talk to her friend or sister daily for suppot    I will know I've met my goal when I don't cry every time I am alone.      Objective #A (Patient Action)    Patient will talk to at least two others about losses and coping.  Status: Continued - Date(s): 10/27/2021    Intervention(s)  Therapist will assign homework : go to [employer] with neighbor friend.    Objective #B  Patient will increase understanding of steps in the grief process.  Status: Continued - Date(s): 10/27/2021    Intervention(s)  Therapist will teach emotional regulation skills. asisting patient to create, collect memories about .    Objective #C  Patient will identify 3 strategies to more effectively address stressors.  Status: Continued - Date(s): 10/27/2021    Intervention(s)  Therapist will Continue to assist patient with looking for open grief suppot near her home.     Patient has reviewed and agreed to the above plan.    Anjelicamatteo JOAQUIM Stafford  October 27, 2021

## 2021-12-15 NOTE — TELEPHONE ENCOUNTER
Called patient. Wanted immunization record mailed. Placed in box to be mailed.          ----- Message from Abdulkadir Rodriguez MD sent at 12/15/2021 10:56 AM CST -----  Regarding: FW: Vaccine booster/card  Please help Laine navigate this- either get card or vaccination record to carry with her  ----- Message -----  From: Rosa Stafford [facility]  Sent: 12/15/2021   9:51 AM CST  To: Abdulkadir Rodriguez MD  Subject: Vaccine booster/card                             Good morning Doc. Jennifer,   I just talked to Laine. She is preparing to travel to [place] next week. On 12/22. I asked her about the booster.  At first she was not sure if she received one. However, I  see she received one on 11/30. Later on she remembered. Though she told me she never received a vaccination card.  I am not sure how to go by to get one but I asked her to call your clinic about this.  Since the dates of her vaccines are recorded, can she get a new card to travel with? I do not know the requirements and we are trying to ease her anxiety related to travel. Also it sounds like she is forgetting some, this time.   Thank you  Rosa

## 2021-12-16 ASSESSMENT — ANXIETY QUESTIONNAIRES: GAD7 TOTAL SCORE: 0

## 2021-12-23 ENCOUNTER — PATIENT OUTREACH (OUTPATIENT)
Dept: CARE COORDINATION | Facility: CLINIC | Age: 86
End: 2021-12-23
Payer: COMMERCIAL

## 2021-12-23 NOTE — PROGRESS NOTES
Clinic Care Coordination Contact    Situation: Patient chart reviewed by care coordinator.    Background: SW completed chart review    Assessment: Patient in contact with CHW     Plan/Recommendations: Standard outreach

## 2022-01-05 ENCOUNTER — VIRTUAL VISIT (OUTPATIENT)
Dept: BEHAVIORAL HEALTH | Facility: HOSPITAL | Age: 87
End: 2022-01-05
Payer: COMMERCIAL

## 2022-01-05 DIAGNOSIS — F43.22 ADJUSTMENT DISORDER WITH ANXIETY: Primary | ICD-10-CM

## 2022-01-05 PROCEDURE — 90834 PSYTX W PT 45 MINUTES: CPT | Mod: TEL,95 | Performed by: SOCIAL WORKER

## 2022-01-05 ASSESSMENT — ANXIETY QUESTIONNAIRES
IF YOU CHECKED OFF ANY PROBLEMS ON THIS QUESTIONNAIRE, HOW DIFFICULT HAVE THESE PROBLEMS MADE IT FOR YOU TO DO YOUR WORK, TAKE CARE OF THINGS AT HOME, OR GET ALONG WITH OTHER PEOPLE: NOT DIFFICULT AT ALL
3. WORRYING TOO MUCH ABOUT DIFFERENT THINGS: NOT AT ALL
7. FEELING AFRAID AS IF SOMETHING AWFUL MIGHT HAPPEN: NOT AT ALL
6. BECOMING EASILY ANNOYED OR IRRITABLE: NOT AT ALL
1. FEELING NERVOUS, ANXIOUS, OR ON EDGE: NOT AT ALL
5. BEING SO RESTLESS THAT IT IS HARD TO SIT STILL: NOT AT ALL
GAD7 TOTAL SCORE: 0
2. NOT BEING ABLE TO STOP OR CONTROL WORRYING: NOT AT ALL

## 2022-01-05 ASSESSMENT — PATIENT HEALTH QUESTIONNAIRE - PHQ9: 5. POOR APPETITE OR OVEREATING: NOT AT ALL

## 2022-01-05 NOTE — PROGRESS NOTES
"                                             Progress Note    Patient Name: Laine Sharma  Date:1/05/2022         Service Type: Individual      Session Start Time: 9:04  Session End Time: 9:50     Session Length: 46 min    Session #: 26    Attendees: Client    Service Modality:  Phone Visit:[phone]      Provider verified identity through the following two step process.  Patient provided:  Patient address and Patient is known previously to provider    The patient has been notified of the following:      \"We have found that certain health care needs can be provided without the need for a face to face visit.  This service lets us provide the care you need with a phone conversation.       I will have full access to your [facility] medical record during this entire phone call.   I will be taking notes for your medical record.      Since this is like an office visit, we will bill your insurance company for this service.       There are potential benefits and risks of telephone visits (e.g. limits to patient confidentiality) that differ from in-person visits.?Confidentiality still applies for telephone services, and nobody will record the visit.  It is important to be in a quiet, private space that is free of distractions (including cell phone or other devices) during the visit.??      If during the course of the call I believe a telephone visit is not appropriate, you will not be charged for this service\"     Consent has been obtained for this service by care team member: Yes      Treatment Plan Last Reviewed: 10/27/2021   PHQ-9 / CHAU-7 : 0/0    DATA  Interactive Complexity: No  Crisis: No       Progress Since Last Session (Related to Symptoms / Goals / Homework):   Symptoms: reports feeling better this week as she is just focusing on packing to travel to [place] to visit her step daughter  .     Homework: Achieved / completed to satisfaction. Patient has been moderately active due to her Thumb issues.     Episode " of Care Goals: Satisfactory progress - ACTION (Actively working towards change); Intervened by reinforcing change plan / affirming steps taken . Reports some motivation to do things as long as the pain in her thumb allows her. Continues to try new tips to have a decent evening. She is some what at the acceptance stage.     Current / Ongoing Stressors and Concerns: Patient reports she is back from her one week trip to [place]. Had a good time with the family there. It was difficult to be visiting without  but family supported her and encouraged her.  Has been anticipating how life would look like once she has a surgery on her thumb. She is going to see her PCP about this on 1/18. A friend of hers offered to come and stay with her for sometime during her recovery. Was reunited with Dina, her Cat. She notes she is the only thing that makes her day. Family here also has been present for her all the time.  Her next visit is on 1/19/2022.     Treatment Objective(s) Addressed in This Session: increase understanding of steps in the grief process. Patient needs ongoing support and reminder about the steps of grief, normalizing her feelings and offering empathy.    Intervention:   Solution Focused: Reviewed to following plans and reinforced them as necessarily. Actively listened to her concerns. Offered empathy and encouraging to keep working on the following distraction activities to reduce loneliness and sadness. These were revisited and reinforced today.1. Take bath at night versus morning 2.  Walk in the morning and evening a day for at least 30 minutes each time. 3.Do not skip meals 4. Keep your devotion time 5. Work on your puzzles for at least 45 min every day. 6.Do meditation using your 5 senses. 7. Call your friend or a family member of our choice. 8. Watch TV or listen to [email or web address]- she is yet to start listening to this radio station. Patient was encouraged to keep with these tips.      ASSESSMENT: Current Emotional  / Mental Status (status of significant symptoms):   Risk status (Self / Other harm or suicidal ideation)   Patient denies current fears or concerns for personal safety.   Patient denies current or recent suicidal ideation or behaviors.   Patient denies current or recent homicidal ideation or behaviors.   Patient denies current or recent self injurious behavior or ideation.   Patient denies other safety concerns.   Patient reports there has been no change in risk factors since their last session.     Patient reports there has been no change in protective factors since their last session.     Recommended that patient call 911 or go to the local ED should there be a change in any of these risk factors.     Appearance:   Appropriate  not seen, not assessed    Eye Contact:   not seen, not assessed    Psychomotor Behavior: Normal    Attitude:   Cooperative    Orientation:   Person Place Time Situation   Speech    Rate / Production: Normal/ Responsive    Volume:  Normal    Mood:    Not assessed, not seen   Affect:    Appropriate    Thought Content:  Clear    Thought Form:  Coherent  Logical    Insight:    Good      Medication Review:   No current psychiatric medications prescribed     Medication Compliance:   Yes     Changes in Health Issues:   None reported     Chemical Use Review:   Substance Use: Chemical use reviewed, no active concerns identified      Tobacco Use: No current tobacco use.      Diagnosis:  1. Adjustment disorder with anxiety      Collateral Reports Completed:   Not Applicable    PLAN: (Patient Tasks / Therapist Tasks / Other):   Patient talk to her friends/family members  when she feels sad for support.  Patient will keep up with her walks to distract herself and stay in shape  Patient will restructure her day using different options discussed to reduce loneliness she experiences in the evening.   Patient's next visit is in 2 weeks    JOAQUIM Newell    __________________________________________________________________  Treatment Plan    Patient's Name: Laine Sharma  YOB: 1933    Date: 10/27/2021    DSM5 Diagnoses:   1. Grief reaction    2. Adjustment disorder with anxiety      Psychosocial / Contextual Factors: Lost  late in 2020. Pain in the  Thumb. Family issues.     WHODAS: 12    Referral / Collaboration:  No referral was needed today.     Anticipated number of session or this episode of care: 12    MeasurableTreatment Goal(s) related to diagnosis / functional impairment(s)  Goal 1: Patient will talk to her friend or sister daily for suppot    I will know I've met my goal when I don't cry every time I am alone.      Objective #A (Patient Action)    Patient will talk to at least two others about losses and coping.  Status: Continued - Date(s): 10/27/2021    Intervention(s)  Therapist will assign homework : go to [employer] with neighbor friend.    Objective #B  Patient will increase understanding of steps in the grief process.  Status: Continued - Date(s): 10/27/2021    Intervention(s)  Therapist will teach emotional regulation skills. asisting patient to create, collect memories about .    Objective #C  Patient will identify 3 strategies to more effectively address stressors.  Status: Continued - Date(s): 10/27/2021    Intervention(s)  Therapist will Continue to assist patient with looking for open grief suppot near her home.     Patient has reviewed and agreed to the above plan.    Rosa Stafford [facility]  October 27, 2021

## 2022-01-06 ASSESSMENT — ANXIETY QUESTIONNAIRES: GAD7 TOTAL SCORE: 0

## 2022-01-11 ENCOUNTER — PATIENT OUTREACH (OUTPATIENT)
Dept: NURSING | Facility: CLINIC | Age: 87
End: 2022-01-11
Payer: COMMERCIAL

## 2022-01-11 NOTE — PROGRESS NOTES
1/11/2022  Clinic Care Coordination Contact    Community Health Worker Follow Up    Care Gaps:     Health Maintenance Due   Topic Date Due     ZOSTER IMMUNIZATION (3 of 3) 12/02/2020     MEDICARE ANNUAL WELLNESS VISIT  09/03/2021       Care Gaps Last addressed on 1-11-22 has appt with PCP on 1-18-22 will discuss care gaps     Goals:   Goals Addressed as of 1/11/2022 at 10:17 AM                    Today    12/6/21       Other (pt-stated)   60%  50%    Added 7/20/21 by Brian Bojorquez      Goal Statement: I want to connect with telephone grief support groups close to my home (grief,  recently in memory care) within 3 months  Date Goal set: updated 7-20-21 Updated 11-3-21 Updated 1-11-22  Barriers:  limited technology unable to engage in Zoom, limited to driving at night time  Strengths: Children support  Date to Achieve By: 4-2021  Patient expressed understanding of goal: yes  Action steps to achieve this goal:  1. I will call [employer] in the evening [phone] for telephone support.  2. I will continue to read and do puzzles to help not feel isolated.  3. I will continue to go eat lunch with sister in law weekly.     Updated: 1-11-22 AL              Other (pt-stated)   20%  30%    Added 7/20/21 by Brian Bojorquez      Goal Statement: I would like support to connect to transportation resources in [facility] within in the next 2 months.  Date Goal set: 6/4/2021 updated: 11-3-21  Barriers: limited transportation, not able to drive at night or far distance, weather conditions  Strengths: support from family member (Ismael)  Date to Achieve By: 1-2021  Patient expressed understanding of goal: Yes  Action steps to achieve this goal:  Not qualify for [employer] or [employer] because I have a car.  1. I will wait to hear from CC Team of any transportation resources from      Updated: 1-11-22 AL             Intervention and Education during outreach:   Called and spoke to patient and follow up on  goals.  Patient reported:  -pain in left thumb.  Has appt to see PCP on 1-28-22   -she does not have a computer or technology to access Zoom to participate in group.  -can't  drive at night time.  -not qualify for [employer] because she has a car.  -goes out with sister in law for lunch on Thursday    Patient very emotional stated she misses Dom and that the evening time is the hardest.   She read books and do puzzles   Has a cat that help too.     CHW provided [employer] [facility] [phone] to call in the evening for support.  Line open from 12 pm to 10pm Monday through Sat.  Patient will call the number.    Reminded of upcoming appts  1-18-22 at 10am  1-19-22 at 1:45pm telephone appt therapy appt    Pt confirmed both appts    [facility]      CHW Follow up: Monthly    CHW Plan: Follow up on goals    CHW Next Follow Up: 2-11-22

## 2022-01-13 DIAGNOSIS — Z76.0 ENCOUNTER FOR MEDICATION REFILL: Primary | ICD-10-CM

## 2022-01-13 DIAGNOSIS — I10 ESSENTIAL HYPERTENSION: ICD-10-CM

## 2022-01-15 PROBLEM — Z78.0 ASYMPTOMATIC MENOPAUSAL STATE: Status: ACTIVE | Noted: 2020-09-03

## 2022-01-15 PROBLEM — F43.21 ADJUSTMENT DISORDER WITH DEPRESSED MOOD: Status: ACTIVE | Noted: 2020-09-25

## 2022-01-15 PROBLEM — Z00.00 HEALTHCARE MAINTENANCE: Status: ACTIVE | Noted: 2018-10-04

## 2022-01-17 NOTE — TELEPHONE ENCOUNTER
Routing refill request to provider for review/approval because:  BP not in range.    Outpatient Medication Detail     Disp Refills Start End ROSE   losartan (COZAAR) 100 MG tablet 90 tablet 2 2/3/2021  No   Sig - Route: Take 1 tablet (100 mg total) by mouth daily. - Oral   Sent to pharmacy as: losartan 100 mg tablet (Cozaar)   E-Prescribing Status: Receipt confirmed by pharmacy (2/3/2021  2:37 PM CST)       losartan (COZAAR) 100 MG tablet [[id number]]    Electronically signed by: Jayjay Gonzalez, RN on 02/03/21 1437 Status: Active   Ordering user: Jayjay Gonzalez RN 02/03/21 1437 Ordering provider: Abdulkadir Rodriguez MD   Authorized by: Abdulkadir Rodriguez MD   Frequency: DAILY 02/03/21 - Until Discontinued Released by: Jayjay Gonzalez RN 02/03/21 1437   Diagnoses  Essential hypertension [I10]         Outpatient Medication Detail     Disp Refills Start End ROSE   hydroCHLOROthiazide (HYDRODIURIL) 25 MG tablet 30 tablet 11 1/6/2021  No   Sig - Route: Take 1 tablet (25 mg total) by mouth daily. - Oral   Sent to pharmacy as: hydroCHLOROthiazide 25 mg tablet (HYDRODIURIL)   E-Prescribing Status: Receipt confirmed by pharmacy (1/6/2021  5:36 PM CST)       hydroCHLOROthiazide (HYDRODIURIL) 25 MG tablet [[id number]]    Electronically signed by: Abdulkadir Rodriguez MD on 01/06/21 1735 Status: Active   Ordering user: Abdulkadir Rodriguez MD 01/06/21 1735 Authorized by: Abdulkadir Rodriguez MD   Frequency: DAILY 01/06/21 - Until Discontinued Released by: Abdulkadir Rodriguez MD 01/06/21 1735   Diagnoses  Essential hypertension [I10]       Last office visit provider:  11/30/21    Requested Prescriptions   Pending Prescriptions Disp Refills     losartan (COZAAR) 100 MG tablet 90 tablet 3     Sig: Take 1 tablet (100 mg) by mouth daily       Angiotensin-II Receptors Failed - 1/13/2022  2:50 PM        Failed - Last blood pressure under 140/90 in past 12 months     BP Readings from Last 3 Encounters:   11/30/21 (!) 164/78  "  10/28/21 119/76   08/12/21 128/58                 Passed - Recent (12 mo) or future (30 days) visit within the authorizing provider's specialty     Patient has had an office visit with the authorizing provider or a provider within the authorizing providers department within the previous 12 mos or has a future within next 30 days. See \"Patient Info\" tab in inbasket, or \"Choose Columns\" in Meds & Orders section of the refill encounter.              Passed - Medication is active on med list        Passed - Patient is age 18 or older        Passed - No active pregnancy on record        Passed - Normal serum creatinine on file in past 12 months     Recent Labs   Lab Test 05/26/21  1054   CR 0.94       Ok to refill medication if creatinine is low          Passed - Normal serum potassium on file in past 12 months     Recent Labs   Lab Test 05/26/21  1054   POTASSIUM 3.9                    Passed - No positive pregnancy test in past 12 months           hydrochlorothiazide (HYDRODIURIL) 25 MG tablet 90 tablet 3     Sig: Take 1 tablet (25 mg) by mouth daily       Diuretics (Including Combos) Protocol Failed - 1/13/2022  2:50 PM        Failed - Blood pressure under 140/90 in past 12 months     BP Readings from Last 3 Encounters:   11/30/21 (!) 164/78   10/28/21 119/76   08/12/21 128/58                 Passed - Recent (12 mo) or future (30 days) visit within the authorizing provider's specialty     Patient has had an office visit with the authorizing provider or a provider within the authorizing providers department within the previous 12 mos or has a future within next 30 days. See \"Patient Info\" tab in inbasket, or \"Choose Columns\" in Meds & Orders section of the refill encounter.              Passed - Medication is active on med list        Passed - Patient is age 18 or older        Passed - No active pregancy on record        Passed - Normal serum creatinine on file in past 12 months     Recent Labs   Lab Test " 05/26/21  1054   CR 0.94              Passed - Normal serum potassium on file in past 12 months     Recent Labs   Lab Test 05/26/21  1054   POTASSIUM 3.9                    Passed - Normal serum sodium on file in past 12 months     Recent Labs   Lab Test 05/26/21  1054                 Passed - No positive pregnancy test in past 12 months             Xochitl Gresham RN 01/16/22 11:26 PM

## 2022-01-18 RX ORDER — HYDROCHLOROTHIAZIDE 25 MG/1
25 TABLET ORAL DAILY
Qty: 90 TABLET | Refills: 3 | Status: SHIPPED | OUTPATIENT
Start: 2022-01-18 | End: 2023-03-20

## 2022-01-18 RX ORDER — LOSARTAN POTASSIUM 100 MG/1
100 TABLET ORAL DAILY
Qty: 90 TABLET | Refills: 3 | Status: SHIPPED | OUTPATIENT
Start: 2022-01-18 | End: 2022-06-21

## 2022-01-19 ENCOUNTER — VIRTUAL VISIT (OUTPATIENT)
Dept: BEHAVIORAL HEALTH | Facility: HOSPITAL | Age: 87
End: 2022-01-19
Payer: COMMERCIAL

## 2022-01-19 DIAGNOSIS — F43.22 ADJUSTMENT DISORDER WITH ANXIETY: Primary | ICD-10-CM

## 2022-01-19 PROCEDURE — 90834 PSYTX W PT 45 MINUTES: CPT | Mod: TEL | Performed by: SOCIAL WORKER

## 2022-01-19 NOTE — PROGRESS NOTES
"                                             Progress Note    Patient Name: Laine Sharma  Date:1/19/2022         Service Type: Individual      Session Start Time: 14:02  Session End Time: 14:40     Session Length: 38 min    Session #: 27    Attendees: Client    Service Modality:  Phone Visit:[phone]      Provider verified identity through the following two step process.  Patient provided:  Patient address and Patient is known previously to provider    The patient has been notified of the following:      \"We have found that certain health care needs can be provided without the need for a face to face visit.  This service lets us provide the care you need with a phone conversation.       I will have full access to your [facility] medical record during this entire phone call.   I will be taking notes for your medical record.      Since this is like an office visit, we will bill your insurance company for this service.       There are potential benefits and risks of telephone visits (e.g. limits to patient confidentiality) that differ from in-person visits.?Confidentiality still applies for telephone services, and nobody will record the visit.  It is important to be in a quiet, private space that is free of distractions (including cell phone or other devices) during the visit.??      If during the course of the call I believe a telephone visit is not appropriate, you will not be charged for this service\"     Consent has been obtained for this service by care team member: Yes      Treatment Plan Last Reviewed: 10/27/2021   PHQ-9 / CHUA-7 : 0/0    DATA  Interactive Complexity: No  Crisis: No       Progress Since Last Session (Related to Symptoms / Goals / Homework):   Symptoms: Improving feeling better this time. states she feels better day by day  .     Homework: Achieved / completed to satisfaction. Patient has been paying attention to her thumb. Though active in her home.      Episode of Care Goals: " Satisfactory progress - ACTION (Actively working towards change); Intervened by reinforcing change plan / affirming steps taken . Reports some motivation to do things as long as the pain in her thumb allows her. Has an appt with her PCP on 2/01 about the next option.     Current / Ongoing Stressors and Concerns: Patient reports she is doing much better. Still has issues with her thumb and she is being careful using it. Has been spending much time organizing her closet and finds it rewarding as it keeps her mind off thinking about her loss. Will visit her brother in [place] in April. She is in touch with her family here. Has been avoiding going out in the cold including going with them to restaurant. Stated they know it is understandable. Does eat well, she said. So far she makes her own meals.  Has been reading at night. Has been getting visits from her  since she can't go to [employer] for now. Her next therapy visit is in 2 weeks. She feels it helps to check on things and be listened to.     Treatment Objective(s) Addressed in This Session:increase understanding of steps in the grief process. Patient needs ongoing support and reminder about the steps of grief, normalizing her feelings and offering empathy.    Intervention:   Solution Focused: Reviewed to following plans and reinforced them as necessarily. Actively listened to her concerns. Offered empathy and encouraging to keep working on the following distraction activities to reduce loneliness and sadness. These were revisited and reinforced today at each visit.1. Take bath at night versus morning 2.  Walk in the morning and evening a day for at least 30 minutes each time as long as weather permits. 3.Do not skip meals 4. Keep your devotion time 5. Work on your puzzles for at least 45 min every day. 6.Do meditation using your 5 senses. 7. Call your friend or a family member of our choice. 8. Watch TV or listen to K[employer]- she is yet to start listening to this  radio station. Patient was encouraged to keep with these tips, today.      ASSESSMENT: Current Emotional / Mental Status (status of significant symptoms):   Risk status (Self / Other harm or suicidal ideation)   Patient denies current fears or concerns for personal safety.   Patient denies current or recent suicidal ideation or behaviors.   Patient denies current or recent homicidal ideation or behaviors.   Patient denies current or recent self injurious behavior or ideation.   Patient denies other safety concerns.   Patient reports there has been no change in risk factors since their last session.     Patient reports there has been no change in protective factors since their last session.     Recommended that patient call 911 or go to the local ED should there be a change in any of these risk factors.     Appearance:   Appropriate  not seen, not assessed    Eye Contact:   not seen, not assessed    Psychomotor Behavior: Normal    Attitude:   Cooperative    Orientation:   Person Place Time Situation   Speech    Rate / Production: Normal/ Responsive    Volume:  Normal    Mood:    Not assessed, not seen   Affect:    Appropriate    Thought Content:  Clear    Thought Form:  Coherent  Logical    Insight:    Good      Medication Review:   No current psychiatric medications prescribed     Medication Compliance:   Yes     Changes in Health Issues:   None reported     Chemical Use Review:   Substance Use: Chemical use reviewed, no active concerns identified      Tobacco Use: No current tobacco use.      Diagnosis:  1. Adjustment disorder with anxiety      Collateral Reports Completed:   Not Applicable    PLAN: (Patient Tasks / Therapist Tasks / Other):   Patient talk to her friends/family members  when she feels sad for support.  Patient will keep up with her walks when weather permits to distract herself and stay in shape  Patient will restructure her day using different options discussed to reduce loneliness she experiences  in the evening.   Patient's next visit is in 2 weeks    Rosa Navaganidia, [facility] CenterSW   __________________________________________________________________  Treatment Plan    Patient's Name: Laine Sharma  YOB: 1933    Date: 10/27/2021    DSM5 Diagnoses:   1. Grief reaction    2. Adjustment disorder with anxiety      Psychosocial / Contextual Factors: Lost  late in 2020. Pain in the  Thumb. Family issues.     WHODAS: 12    Referral / Collaboration:  No referral was needed today.     Anticipated number of session or this episode of care: 12    MeasurableTreatment Goal(s) related to diagnosis / functional impairment(s)  Goal 1: Patient will talk to her friend or sister daily for suppot    I will know I've met my goal when I don't cry every time I am alone.      Objective #A (Patient Action)    Patient will talk to at least two others about losses and coping.  Status: Continued - Date(s): 10/27/2021    Intervention(s)  Therapist will assign homework : go to [employer] with neighbor friend.    Objective #B  Patient will increase understanding of steps in the grief process.  Status: Continued - Date(s): 10/27/2021    Intervention(s)  Therapist will teach emotional regulation skills. asisting patient to create, collect memories about .    Objective #C  Patient will identify 3 strategies to more effectively address stressors.  Status: Continued - Date(s): 10/27/2021    Intervention(s)  Therapist will Continue to assist patient with looking for open grief suppot near her home.     Patient has reviewed and agreed to the above plan.    Rosa HerreraFEDE cooper  October 27, 2021

## 2022-01-21 ENCOUNTER — TELEPHONE (OUTPATIENT)
Dept: FAMILY MEDICINE | Facility: CLINIC | Age: 87
End: 2022-01-21
Payer: COMMERCIAL

## 2022-01-21 DIAGNOSIS — I10 ESSENTIAL HYPERTENSION: Primary | ICD-10-CM

## 2022-01-21 NOTE — TELEPHONE ENCOUNTER
General Call:     Who is calling:  [facility]     Reason for Call:  Lorsartan is on backorder - ALL Lorsartan  Are.  Pt is aware it is on backorder.  Please speak with pt about alternates. Thanks    Date of last appointment with provider: 11/30/21    Okay to leave a detailed message:No     Call taken on 1/21/22 at 2 pm by Bella Orozco CMA, CMT

## 2022-01-25 ENCOUNTER — PATIENT OUTREACH (OUTPATIENT)
Dept: CARE COORDINATION | Facility: CLINIC | Age: 87
End: 2022-01-25
Payer: COMMERCIAL

## 2022-01-25 RX ORDER — VALSARTAN 80 MG/1
80 TABLET ORAL DAILY
Qty: 30 TABLET | Refills: 3 | Status: SHIPPED | OUTPATIENT
Start: 2022-01-25 | End: 2023-03-20

## 2022-01-25 NOTE — PROGRESS NOTES
Clinic Care Coordination Contact    Situation: Patient chart reviewed by care coordinator.    Background: SW completed chart review    Assessment: CHW in contact with patient. Patient attending therapy appointments    Plan/Recommendations: Standard outreach

## 2022-02-01 ENCOUNTER — OFFICE VISIT (OUTPATIENT)
Dept: FAMILY MEDICINE | Facility: CLINIC | Age: 87
End: 2022-02-01
Payer: COMMERCIAL

## 2022-02-01 ENCOUNTER — MEDICAL CORRESPONDENCE (OUTPATIENT)
Dept: HEALTH INFORMATION MANAGEMENT | Facility: CLINIC | Age: 87
End: 2022-02-01

## 2022-02-01 VITALS
RESPIRATION RATE: 16 BRPM | BODY MASS INDEX: 31.14 KG/M2 | WEIGHT: 149 LBS | DIASTOLIC BLOOD PRESSURE: 72 MMHG | SYSTOLIC BLOOD PRESSURE: 125 MMHG | HEART RATE: 75 BPM

## 2022-02-01 DIAGNOSIS — Z00.00 HEALTHCARE MAINTENANCE: ICD-10-CM

## 2022-02-01 DIAGNOSIS — D64.9 ANEMIA, UNSPECIFIED TYPE: ICD-10-CM

## 2022-02-01 DIAGNOSIS — I10 ESSENTIAL HYPERTENSION: Primary | ICD-10-CM

## 2022-02-01 DIAGNOSIS — M18.12 ARTHRITIS OF CARPOMETACARPAL (CMC) JOINT OF LEFT THUMB: ICD-10-CM

## 2022-02-01 PROBLEM — N18.31 CHRONIC KIDNEY DISEASE, STAGE 3A (H): Status: RESOLVED | Noted: 2022-02-01 | Resolved: 2022-02-01

## 2022-02-01 PROBLEM — N18.31 CHRONIC KIDNEY DISEASE, STAGE 3A (H): Status: ACTIVE | Noted: 2022-02-01

## 2022-02-01 LAB
ANION GAP SERPL CALCULATED.3IONS-SCNC: 9 MMOL/L (ref 5–18)
BUN SERPL-MCNC: 18 MG/DL (ref 8–28)
CALCIUM SERPL-MCNC: 8.9 MG/DL (ref 8.5–10.5)
CHLORIDE BLD-SCNC: 104 MMOL/L (ref 98–107)
CO2 SERPL-SCNC: 25 MMOL/L (ref 22–31)
CREAT SERPL-MCNC: 0.85 MG/DL (ref 0.6–1.1)
ERYTHROCYTE [DISTWIDTH] IN BLOOD BY AUTOMATED COUNT: 12.6 % (ref 10–15)
FERRITIN SERPL-MCNC: 19 NG/ML (ref 10–130)
GFR SERPL CREATININE-BSD FRML MDRD: 66 ML/MIN/1.73M2
GLUCOSE BLD-MCNC: 88 MG/DL (ref 70–125)
HCT VFR BLD AUTO: 38.8 % (ref 35–47)
HGB BLD-MCNC: 12.4 G/DL (ref 11.7–15.7)
MCH RBC QN AUTO: 30.1 PG (ref 26.5–33)
MCHC RBC AUTO-ENTMCNC: 32 G/DL (ref 31.5–36.5)
MCV RBC AUTO: 94 FL (ref 78–100)
PLATELET # BLD AUTO: 226 10E3/UL (ref 150–450)
POTASSIUM BLD-SCNC: 3.5 MMOL/L (ref 3.5–5)
RBC # BLD AUTO: 4.12 10E6/UL (ref 3.8–5.2)
SODIUM SERPL-SCNC: 138 MMOL/L (ref 136–145)
WBC # BLD AUTO: 6.3 10E3/UL (ref 4–11)

## 2022-02-01 PROCEDURE — 36415 COLL VENOUS BLD VENIPUNCTURE: CPT | Performed by: FAMILY MEDICINE

## 2022-02-01 PROCEDURE — 99214 OFFICE O/P EST MOD 30 MIN: CPT | Performed by: FAMILY MEDICINE

## 2022-02-01 PROCEDURE — 85027 COMPLETE CBC AUTOMATED: CPT | Performed by: FAMILY MEDICINE

## 2022-02-01 PROCEDURE — 82728 ASSAY OF FERRITIN: CPT | Performed by: FAMILY MEDICINE

## 2022-02-01 PROCEDURE — 80048 BASIC METABOLIC PNL TOTAL CA: CPT | Performed by: FAMILY MEDICINE

## 2022-02-01 NOTE — PROGRESS NOTES
"Assessment/ Plan  Essential hypertension  I was notified of losartan shortage by pharmacy.  Change patient's ARB from losartan to Diovan 80 mg.  She has not picked this up yet.  Also on amlodipine 10 mg, hydrochlorothiazide 25.  Will check BMP.    Anemia, unspecified type  Last hemoglobin 11.8.  Represented a small drop from year prior.  We will recheck and check ferritin today.    Healthcare maintenance  We will check Shingrix coverage.  She had first shot in 2020.  Needs second.    Arthritis of carpometacarpal (CMC) joint of left thumb  Needs new thumb spica splint.  We will order this for her.         Stemming from conversation in the last 2 visits, I was concerned about patient's memory.  Informal Mini-Mental Status exam done in which she scores quite well with the exception of \"serial 7\" testing.  Is able to spell world backwards perfectly  Perfect on all other accounts.  I am reassured  Subjective  CC:  chief complaint  HPI:  88-year-old  Here for follow-up.  History of hypertension.  Currently on losartan.  But apparently there is a shortage of this.  So switched her to the Diovan.  She has not picked this up.  Make sure she understood plan.  Amlodipine 10, hydrochlorothiazide.  Continues to have problems with her thumb, hurts when she moves it  PFSH:  Patient Active Problem List   Diagnosis     Diverticulosis     Esophageal reflux     Osteoarthritis     Hyperlipidemia     Essential hypertension     Healthcare maintenance     Pes anserine bursitis     Nodule of finger of left hand     Grief reaction     Asymptomatic menopausal state      Adjustment disorder with depressed mood     Rotator cuff tendonitis, right     Arthritis of carpometacarpal (CMC) joint of left thumb     Anemia, unspecified type     acetaminophen (TYLENOL) 325 MG tablet, [ACETAMINOPHEN (TYLENOL) 325 MG TABLET] Take 650 mg by mouth every 6 (six) hours as needed for pain.  amLODIPine (NORVASC) 10 MG tablet, [AMLODIPINE (NORVASC) 10 MG TABLET] " Take 1 tablet by mouth once daily  escitalopram (LEXAPRO) 5 MG tablet, Take 1 tablet (5 mg) by mouth daily  hydrochlorothiazide (HYDRODIURIL) 25 MG tablet, Take 1 tablet (25 mg) by mouth daily  losartan (COZAAR) 100 MG tablet, Take 1 tablet (100 mg) by mouth daily  valsartan (DIOVAN) 80 MG tablet, Take 1 tablet (80 mg) by mouth daily    No current facility-administered medications on file prior to visit.       History   Smoking Status     Never Smoker   Smokeless Tobacco     Never Used     Social History     Social History Narrative    Patient is  to Dom  Moved to [facility] in 2016       Patient Care Team:  Abdulkadir Rodriguez MD as PCP - Brian Beckman as Community Health Worker (Primary Care - CC)  Ugo Garcia LICSW as Lead Care Coordinator (Primary Care - CC)  Abdulkadir Rodriguez MD as Assigned PCP  ROS  As      Objective  Physical Exam  Vitals:    02/01/22 1031   BP: 125/72   BP Location: Left arm   Patient Position: Sitting   Cuff Size: Adult Regular   Pulse: 75   Resp: 16   Weight: 67.6 kg (149 lb)     Body mass index is 31.14 kg/m .  Gen- alert, oriented/ appropriately responsive  HEENT- normal cephalic, atraumatic.   Chest- Normal inspiration and expiration.    Clear to ascultation.    No chest wall deformity or scar.  CV- Heart regular rate and rhythm  normal tones, no murmurs   No gallops or rubs.  Ext- appear well perfused, no edema  Skin- warm and dry,   no visualized rash    Diagnostics:   None      Please note: Voice recognition software was used in this dictation.  It may therefore contain typographical errors.      DME (Durable Medical Equipment) Orders and Documentation  Orders Placed This Encounter   Procedures     Wrist/Arm/Hand Supplies Order      The patient was assessed and it was determined the patient is in need of the following listed DME Supplies/Equipment. Please complete supporting documentation below to demonstrate medical necessity.      Wrist/Arm/Hand Bracing Supplies  Documentation  Patient requires the use of the ordered bracing device due to following medical need/condition: Thumb pain, carpometacarpal arthritis.

## 2022-02-01 NOTE — ASSESSMENT & PLAN NOTE
Last hemoglobin 11.8.  Represented a small drop from year prior.  We will recheck and check ferritin today.

## 2022-02-01 NOTE — PROGRESS NOTES
Wt Readings from Last 3 Encounters:   02/01/22 67.6 kg (149 lb)   11/30/21 68 kg (150 lb)   10/28/21 67.6 kg (149 lb)

## 2022-02-01 NOTE — LETTER
February 2, 2022      Laine Sharma  [address] [place] [address]        Dear ,    We are writing to inform you of your test results.    Blood tests look perfect,Laine      Resulted Orders   Basic metabolic panel  (Ca, Cl, CO2, Creat, Gluc, K, Na, BUN)   Result Value Ref Range    Sodium 138 136 - 145 mmol/L    Potassium 3.5 3.5 - 5.0 mmol/L    Chloride 104 98 - 107 mmol/L    Carbon Dioxide (CO2) 25 22 - 31 mmol/L    Anion Gap 9 5 - 18 mmol/L    Urea Nitrogen 18 8 - 28 mg/dL    Creatinine 0.85 0.60 - 1.10 mg/dL    Calcium 8.9 8.5 - 10.5 mg/dL    Glucose 88 70 - 125 mg/dL    GFR Estimate 66 >60 mL/min/1.73m2      Comment:      Effective December 21, 2021 eGFRcr in adults is calculated using the 2021 CKD-EPI creatinine equation which includes age and gender (Tatyana foley al., NE, DOI: 10.1056/HVYBdr7439354)   CBC with platelets   Result Value Ref Range    WBC Count 6.3 4.0 - 11.0 10e3/uL    RBC Count 4.12 3.80 - 5.20 10e6/uL    Hemoglobin 12.4 11.7 - 15.7 g/dL    Hematocrit 38.8 35.0 - 47.0 %    MCV 94 78 - 100 fL    MCH 30.1 26.5 - 33.0 pg    MCHC 32.0 31.5 - 36.5 g/dL    RDW 12.6 10.0 - 15.0 %    Platelet Count 226 150 - 450 10e3/uL   Ferritin   Result Value Ref Range    Ferritin 19 10 - 130 ng/mL       If you have any questions or concerns, please call the clinic at the number listed above.       Sincerely,      Abdulkadir Rodriguez MD

## 2022-02-01 NOTE — ASSESSMENT & PLAN NOTE
I was notified of losartan shortage by pharmacy.  Change patient's ARB from losartan to Diovan 80 mg.  She has not picked this up yet.  Also on amlodipine 10 mg, hydrochlorothiazide 25.  Will check BMP.

## 2022-02-02 ENCOUNTER — VIRTUAL VISIT (OUTPATIENT)
Dept: BEHAVIORAL HEALTH | Facility: HOSPITAL | Age: 87
End: 2022-02-02
Payer: COMMERCIAL

## 2022-02-02 DIAGNOSIS — F43.22 ADJUSTMENT DISORDER WITH ANXIETY: Primary | ICD-10-CM

## 2022-02-02 PROCEDURE — 90834 PSYTX W PT 45 MINUTES: CPT | Mod: TEL | Performed by: SOCIAL WORKER

## 2022-02-02 ASSESSMENT — ANXIETY QUESTIONNAIRES
1. FEELING NERVOUS, ANXIOUS, OR ON EDGE: NOT AT ALL
2. NOT BEING ABLE TO STOP OR CONTROL WORRYING: NOT AT ALL
5. BEING SO RESTLESS THAT IT IS HARD TO SIT STILL: NOT AT ALL
6. BECOMING EASILY ANNOYED OR IRRITABLE: NOT AT ALL
3. WORRYING TOO MUCH ABOUT DIFFERENT THINGS: NOT AT ALL
GAD7 TOTAL SCORE: 0
7. FEELING AFRAID AS IF SOMETHING AWFUL MIGHT HAPPEN: NOT AT ALL

## 2022-02-02 ASSESSMENT — COLUMBIA-SUICIDE SEVERITY RATING SCALE - C-SSRS: 1. IN THE PAST MONTH, HAVE YOU WISHED YOU WERE DEAD OR WISHED YOU COULD GO TO SLEEP AND NOT WAKE UP?: NO

## 2022-02-02 ASSESSMENT — PATIENT HEALTH QUESTIONNAIRE - PHQ9
5. POOR APPETITE OR OVEREATING: NOT AT ALL
SUM OF ALL RESPONSES TO PHQ QUESTIONS 1-9: 0

## 2022-02-02 NOTE — PROGRESS NOTES
"                                             Progress Note    Patient Name: Laine Sharma  Date:2/02/2022         Service Type: Individual      Session Start Time: 9:02  Session End Time: 9:44     Session Length: 42 min    Session #: 28    Attendees: Client    Service Modality:  Phone Visit:[phone]      Provider verified identity through the following two step process.  Patient provided:  Patient address and Patient is known previously to provider    The patient has been notified of the following:      \"We have found that certain health care needs can be provided without the need for a face to face visit.  This service lets us provide the care you need with a phone conversation.       I will have full access to your [facility] medical record during this entire phone call.   I will be taking notes for your medical record.      Since this is like an office visit, we will bill your insurance company for this service.       There are potential benefits and risks of telephone visits (e.g. limits to patient confidentiality) that differ from in-person visits.?Confidentiality still applies for telephone services, and nobody will record the visit.  It is important to be in a quiet, private space that is free of distractions (including cell phone or other devices) during the visit.??      If during the course of the call I believe a telephone visit is not appropriate, you will not be charged for this service\"     Consent has been obtained for this service by care team member: Yes      Treatment Plan Last Reviewed: 12/02/2022   PHQ-9 / CHAU-7 : 0/0    DATA  Interactive Complexity: No  Crisis: No       Progress Since Last Session (Related to Symptoms / Goals / Homework):   Symptoms: Improving feeling better this time. states she feels better day by day  .     Homework: Achieved / completed to satisfaction. Patient has been paying attention to her thumb. Though active in her home.      Episode of Care Goals: " "Satisfactory progress - ACTION (Actively working towards change); Intervened by reinforcing change plan / affirming steps taken . Reports some motivation to do things as long as the pain in her thumb allows her. Has an appt with her PCP on  about the next option.     Current / Ongoing Stressors and Concerns: Patient reports she is doing much better. Still has issues with her thumb and she is being careful using it. She had a visit with her PCP. Will follow the instructions given will keep in touch with her PCP for any future intervention. Had a rough day yesterday. Her step son wants her update her Will. She and her  have put together this Will in . Nothing changed since then except   in . Patient was called by her step son and he was with a new  and her step daughter. Both over the phone. She reports being confused by his motif. Not sure why many things have to happen but much why the Will needs an update. Patient was not happy and let the \"grand son handle whatever.\" She is not on board with any change and she is not sure how to tell as her main beneficially is her brother who is sick and lives in [place].  Patient has her step son on the will. The rest are her relatives. She is planning to visit her brother in [place] in April. He hopes to discuss this with him. She is not going to read and sign any paper work, will need a time to read before she can sign any paperwork. She also has the right to refuse to update her will. She is still her own guardian. Has been reading at night. Has been getting visits from her  since she can't go to [employer] for now. Her next therapy visit is in 2 weeks. She feels it helps to check on things and be listened to.     Treatment Objective(s) Addressed in This Session:Family relationship; her will and her health/Thumb.     Intervention:   Solution Focused: Reviewed to following plans and reinforced them as necessarily. Actively listened to her " concerns. Offered empathy and encouraging to keep working on the following distraction activities to reduce loneliness and sadness. These were revisited and reinforced today at each visit.1. Take bath at night versus morning 2.  Walk in the morning and evening a day for at least 30 minutes each time as long as weather permits. 3.Do not skip meals 4. Keep your devotion time 5. Work on your puzzles for at least 45 min every day. 6.Do meditation using your 5 senses. 7. Call your friend or a family member of our choice. 8. Watch TV.     ASSESSMENT: Current Emotional / Mental Status (status of significant symptoms):   Risk status (Self / Other harm or suicidal ideation)   Patient denies current fears or concerns for personal safety.   Patient denies current or recent suicidal ideation or behaviors.   Patient denies current or recent homicidal ideation or behaviors.   Patient denies current or recent self injurious behavior or ideation.   Patient denies other safety concerns.   Patient reports there has been no change in risk factors since their last session.     Patient reports there has been no change in protective factors since their last session.     Recommended that patient call 911 or go to the local ED should there be a change in any of these risk factors.     Appearance:   Appropriate  not seen, not assessed    Eye Contact:   not seen, not assessed    Psychomotor Behavior: Normal    Attitude:   Cooperative    Orientation:   Person Place Time Situation   Speech    Rate / Production: Normal/ Responsive    Volume:  Normal    Mood:    Not assessed, not seen   Affect:    Appropriate    Thought Content:  Clear    Thought Form:  Coherent  Logical    Insight:    Good      Medication Review:   No current psychiatric medications prescribed as of today 2/02/2022     Medication Compliance:   Yes     Changes in Health Issues:   None reported     Chemical Use Review:   Substance Use: Chemical use reviewed, no active concerns  identified      Tobacco Use: No current tobacco use.      Diagnosis:  1. Adjustment disorder with anxiety      Collateral Reports Completed:   Not Applicable    PLAN: (Patient Tasks / Therapist Tasks / Other):   Patient talk to her friends/family members  when she feels sad for support.  Patient will keep up with her walks when weather permits to distract herself and stay in shape  Patient will restructure her day using different options discussed to reduce loneliness she experiences in the evening.  Patient will discuss with her step son about reading her Will before signing any forms   Patient's next visit is in 2 weeks    JOAQUIM Newell   Provider Oversight:  Dr. Dio Garza  __________________________________________________________________  Treatment Plan    Patient's Name: Laine Sharma  YOB: 1933    Date: 2/02/2022    DSM5 Diagnoses:   1. Grief reaction    2. Adjustment disorder with anxiety      Psychosocial / Contextual Factors: Lost  late in 2020. Pain in the  Thumb. Family issues.     WHODAS: 12    Referral / Collaboration:  No referral was needed today.     Anticipated number of session or this episode of care: 12    MeasurableTreatment Goal(s) related to diagnosis / functional impairment(s)  Goal 1: Patient will talk to her friend or sister daily for suppot    I will know I've met my goal when I don't cry every time I am alone.      Objective #A (Patient Action)    Patient will talk to at least two others about losses and coping.  Status: Continued - Date(s): 2/02/2022    Intervention(s)  Therapist will assign homework : go to [employer] with neighbor friend.    Objective #B  Patient will increase understanding of steps in the grief process.  Status: Continued - Date(s): 2/02/2022    Intervention(s)  Therapist will teach emotional regulation skills. asisting patient to create, collect memories about .    Objective #C  Patient will identify 3 strategies to more effectively  address stressors.  Status: Continued - Date(s): 2/02/2022    Intervention(s)  Therapist will Continue to assist patient with looking for open grief suppot near her home.     Patient has reviewed and agreed to the above plan.    JOAQUIM Newell  February, 02/2022

## 2022-02-03 ASSESSMENT — ANXIETY QUESTIONNAIRES: GAD7 TOTAL SCORE: 0

## 2022-02-11 ENCOUNTER — PATIENT OUTREACH (OUTPATIENT)
Dept: NURSING | Facility: CLINIC | Age: 87
End: 2022-02-11
Payer: COMMERCIAL

## 2022-02-11 NOTE — PROGRESS NOTES
2/11/2022  Clinic Care Coordination Contact    Community Health Worker Follow Up    Care Gaps:     Health Maintenance Due   Topic Date Due     ZOSTER IMMUNIZATION (3 of 3) 12/02/2020     MEDICARE ANNUAL WELLNESS VISIT  09/03/2021       Care Gap Goal set: Yes    Goals:   Goals Addressed as of 2/11/2022 at 10:20 AM                    Today    1/11/22       Other (pt-stated)   30%  20%    Added 7/20/21 by Brian Bojorquez      Goal Statement: I would like support to connect to transportation resources in [facility] in the next 2 months.  Date Goal set: 6/4/2021 updated: 11-3-21  Barriers: limited transportation, not able to drive at night or far distance, weather conditions  Strengths: support from family member (Ismael)  Date to Achieve By: 1-2021  Patient expressed understanding of goal: Yes  Action steps to achieve this goal:  Not qualify for [employer] or [employer] because I have a car.  1. I will wait to hear from CC Team of any transportation resources from      Updated: 2-11-22 AL             Intervention and Education during outreach:   Called and spoke to patient and follow up on goals  Patient reported:  -doing okay. Doing laundry every Friday  -continue to talk to Rosa every other 2 weeks  -.Because the [employer] group starts at 6;30pm  too dark to drive not comfortable driving in the winter and in the dark.  She will wait until the summer  to go.  In the mean time she has support from her sister in law, therapist Rosa and has the [employer] for support.  CHW will continue to research for resources and transportation       CHW Follow up: Monthly    CHW Plan: Follow up on goals    CHW Next Follow Up: 3-11-22

## 2022-02-23 ENCOUNTER — VIRTUAL VISIT (OUTPATIENT)
Dept: BEHAVIORAL HEALTH | Facility: HOSPITAL | Age: 87
End: 2022-02-23
Payer: COMMERCIAL

## 2022-02-23 DIAGNOSIS — F43.22 ADJUSTMENT DISORDER WITH ANXIETY: Primary | ICD-10-CM

## 2022-02-23 PROCEDURE — 90834 PSYTX W PT 45 MINUTES: CPT | Mod: 95 | Performed by: SOCIAL WORKER

## 2022-02-23 ASSESSMENT — ANXIETY QUESTIONNAIRES
7. FEELING AFRAID AS IF SOMETHING AWFUL MIGHT HAPPEN: NOT AT ALL
2. NOT BEING ABLE TO STOP OR CONTROL WORRYING: NOT AT ALL
GAD7 TOTAL SCORE: 0
6. BECOMING EASILY ANNOYED OR IRRITABLE: NOT AT ALL
5. BEING SO RESTLESS THAT IT IS HARD TO SIT STILL: NOT AT ALL
1. FEELING NERVOUS, ANXIOUS, OR ON EDGE: NOT AT ALL
3. WORRYING TOO MUCH ABOUT DIFFERENT THINGS: NOT AT ALL
IF YOU CHECKED OFF ANY PROBLEMS ON THIS QUESTIONNAIRE, HOW DIFFICULT HAVE THESE PROBLEMS MADE IT FOR YOU TO DO YOUR WORK, TAKE CARE OF THINGS AT HOME, OR GET ALONG WITH OTHER PEOPLE: NOT DIFFICULT AT ALL

## 2022-02-23 ASSESSMENT — PATIENT HEALTH QUESTIONNAIRE - PHQ9: 5. POOR APPETITE OR OVEREATING: NOT AT ALL

## 2022-02-23 NOTE — PROGRESS NOTES
"                                             Progress Note    Patient Name: Laine Sharma  Date:2/23/2022         Service Type: Individual      Session Start Time: 9:04  Session End Time: 9:46     Session Length: 42 min    Session #: 29    Attendees: Client    Service Modality:  Phone Visit:[phone]      Provider verified identity through the following two step process.  Patient provided:  Patient is known previously to provider    The patient has been notified of the following:      \"We have found that certain health care needs can be provided without the need for a face to face visit.  This service lets us provide the care you need with a phone conversation.       I will have full access to your [facility] medical record during this entire phone call.   I will be taking notes for your medical record.      Since this is like an office visit, we will bill your insurance company for this service.       There are potential benefits and risks of telephone visits (e.g. limits to patient confidentiality) that differ from in-person visits.?Confidentiality still applies for telephone services, and nobody will record the visit.  It is important to be in a quiet, private space that is free of distractions (including cell phone or other devices) during the visit.??      If during the course of the call I believe a telephone visit is not appropriate, you will not be charged for this service\"     Consent has been obtained for this service by care team member: Yes      Treatment Plan Last Reviewed: 12/02/2022   PHQ-9 / CHAU-7 : 0/0    DATA  Interactive Complexity: No  Crisis: No       Progress Since Last Session (Related to Symptoms / Goals / Homework):   Symptoms: Improving feeling better this time. states she feels better day by day .    Homework: Achieved / completed to satisfaction. Notes her thumb is still hurting. This has been the source of stress for her lately.     Episode of Care Goals: Satisfactory progress - " ACTION (Actively working towards change); Intervened by reinforcing change plan / affirming steps taken . Reports some motivation to do things as long as the pain in her thumb allows her. She is planning to schedule an appt to be seen again.     Current / Ongoing Stressors and Concerns: Patient reports no change on how she feels about her loss. Though finds it is always good to share her feelings. She is looking forward to visiting her brother in [place] at the end of March for 2 weeks. She is still in touch with her family here and has been getting visits from her  and friends. Has been careful driving when it is a bad weather. Will schedule to see her PCP for her Thumb that still is bothering her. Her next therapy visit is in 2 weeks. She feels it helps to check on things and be listened to.     Treatment Objective(s) Addressed in This Session:Family relationship; her will and her health/Thumb.     Intervention:   Solution Focused: Reviewed to following plans and reinforced them as necessarily. Actively listened to her concerns. Offered empathy and encouraging to keep working on the following distraction activities to reduce loneliness and sadness. These were revisited and reinforced today at each visit.1. Take bath at night versus morning 2.  Walk in the morning and evening a day for at least 30 minutes each time as long as weather permits. 3.Do not skip meals 4. Keep your devotion time 5. Work on your puzzles for at least 45 min every day. 6.Do meditation using your 5 senses. 7. Call your friend or a family member of our choice. 8. Watch TV.     ASSESSMENT: Current Emotional / Mental Status (status of significant symptoms):   Risk status (Self / Other harm or suicidal ideation)   Patient denies current fears or concerns for personal safety.   Patient denies current or recent suicidal ideation or behaviors.   Patient denies current or recent homicidal ideation or behaviors.   Patient denies current or recent  self injurious behavior or ideation.   Patient denies other safety concerns.   Patient reports there has been no change in risk factors since their last session.     Patient reports there has been no change in protective factors since their last session.     Recommended that patient call 911 or go to the local ED should there be a change in any of these risk factors.     Appearance:   not seen, not assessed    Eye Contact:   Good    Psychomotor Behavior: not assessed, not seen    Attitude:   Cooperative    Orientation:   Person Place Time Situation   Speech    Rate / Production: Normal/ Responsive    Volume:  Normal    Mood:    Not assessed, not seen   Affect:    Appropriate    Thought Content:  Clear    Thought Form:  Coherent  Logical    Insight:    Good      Medication Review:   No current psychiatric medications prescribed as of today 2/23/2022     Medication Compliance:   Yes. When prescribed     Changes in Health Issues:   None reported     Chemical Use Review:   Substance Use: Chemical use reviewed, no active concerns identified      Tobacco Use: No current tobacco use.      Diagnosis:  1. Adjustment disorder with anxiety      Collateral Reports Completed:   Not Applicable    PLAN: (Patient Tasks / Therapist Tasks / Other): Goal reviewed today:2/23/2022  Patient talk to her friends/family members  when she feels sad for support.  Patient will keep up with her walks when weather permits to distract herself and stay in shape  Patient will restructure her day using different options discussed to reduce loneliness she experiences in the evening.  Patient's next visit is in 2 weeks    JOAQUIM Newell   Provider Oversight:  Dr. Dio Garza  __________________________________________________________________  Treatment Plan    Patient's Name: Laine Sharma  YOB: 1933    Date: 2/02/2022    DSM5 Diagnoses:   1. Grief reaction    2. Adjustment disorder with anxiety      Psychosocial / Contextual  Factors: Lost  late in 2020. Pain in the  Thumb. Family issues.     WHODAS: 12    Referral / Collaboration:  No referral was needed today.     Anticipated number of session or this episode of care: 12    MeasurableTreatment Goal(s) related to diagnosis / functional impairment(s)  Goal 1: Patient will talk to her friend or sister daily for suppot    I will know I've met my goal when I don't cry every time I am alone.      Objective #A (Patient Action)    Patient will increase understanding of steps in the grief process.  Status: Continued - Date(s): 2/02/2022    Intervention(s)  Therapist will assign homework : go to [employer] with neighbor friend.    Objective #B  Patient will increase understanding of steps in the grief process.  Status: Continued - Date(s): 2/02/2022    Intervention(s)  Therapist will teach emotional regulation skills. asisting patient to create, collect memories about .    Objective #C  Patient will identify 3 strategies to more effectively address stressors.  Status: Continued - Date(s): 2/02/2022    Intervention(s)  Therapist will Continue to assist patient with looking for open grief suppot near her home.     Patient has reviewed and agreed to the above plan.    JOAQUIM Newell  February, 02/2022

## 2022-02-24 ASSESSMENT — ANXIETY QUESTIONNAIRES: GAD7 TOTAL SCORE: 0

## 2022-03-08 ENCOUNTER — VIRTUAL VISIT (OUTPATIENT)
Dept: BEHAVIORAL HEALTH | Facility: HOSPITAL | Age: 87
End: 2022-03-08
Payer: COMMERCIAL

## 2022-03-08 DIAGNOSIS — F43.21 GRIEF REACTION: ICD-10-CM

## 2022-03-08 DIAGNOSIS — F43.22 ADJUSTMENT DISORDER WITH ANXIETY: Primary | ICD-10-CM

## 2022-03-08 PROCEDURE — 90834 PSYTX W PT 45 MINUTES: CPT | Mod: 95 | Performed by: SOCIAL WORKER

## 2022-03-08 ASSESSMENT — ANXIETY QUESTIONNAIRES
IF YOU CHECKED OFF ANY PROBLEMS ON THIS QUESTIONNAIRE, HOW DIFFICULT HAVE THESE PROBLEMS MADE IT FOR YOU TO DO YOUR WORK, TAKE CARE OF THINGS AT HOME, OR GET ALONG WITH OTHER PEOPLE: NOT DIFFICULT AT ALL
5. BEING SO RESTLESS THAT IT IS HARD TO SIT STILL: NOT AT ALL
7. FEELING AFRAID AS IF SOMETHING AWFUL MIGHT HAPPEN: NOT AT ALL
3. WORRYING TOO MUCH ABOUT DIFFERENT THINGS: NOT AT ALL
GAD7 TOTAL SCORE: 0
6. BECOMING EASILY ANNOYED OR IRRITABLE: NOT AT ALL
1. FEELING NERVOUS, ANXIOUS, OR ON EDGE: NOT AT ALL
2. NOT BEING ABLE TO STOP OR CONTROL WORRYING: NOT AT ALL

## 2022-03-08 ASSESSMENT — COLUMBIA-SUICIDE SEVERITY RATING SCALE - C-SSRS
TOTAL  NUMBER OF INTERRUPTED ATTEMPTS SINCE LAST CONTACT: NO
SUICIDE, SINCE LAST CONTACT: NO
1. SINCE LAST CONTACT, HAVE YOU WISHED YOU WERE DEAD OR WISHED YOU COULD GO TO SLEEP AND NOT WAKE UP?: NO
6. HAVE YOU EVER DONE ANYTHING, STARTED TO DO ANYTHING, OR PREPARED TO DO ANYTHING TO END YOUR LIFE?: NO
TOTAL  NUMBER OF ABORTED OR SELF INTERRUPTED ATTEMPTS SINCE LAST CONTACT: NO
2. HAVE YOU ACTUALLY HAD ANY THOUGHTS OF KILLING YOURSELF?: NO

## 2022-03-08 ASSESSMENT — PATIENT HEALTH QUESTIONNAIRE - PHQ9: 5. POOR APPETITE OR OVEREATING: NOT AT ALL

## 2022-03-08 NOTE — PROGRESS NOTES
"    [facility]                                     Progress Note    Patient Name: Laine Sharma  Date: 3/08/2022         Service Type: Individual      Session Start Time: 9:10  Session End Time: 9:55     Session Length: 45    Session #: 30    Attendees: Client    Service Modality:  Phone Visit:      Provider verified identity through the following two step process.  Patient provided:  Patient is known previously to provider    The patient has been notified of the following:      \"We have found that certain health care needs can be provided without the need for a face to face visit.  This service lets us provide the care you need with a phone conversation.       I will have full access to your [facility] medical record during this entire phone call.   I will be taking notes for your medical record.      Since this is like an office visit, we will bill your insurance company for this service.       There are potential benefits and risks of telephone visits (e.g. limits to patient confidentiality) that differ from in-person visits.?Confidentiality still applies for telephone services, and nobody will record the visit.  It is important to be in a quiet, private space that is free of distractions (including cell phone or other devices) during the visit.??      If during the course of the call I believe a telephone visit is not appropriate, you will not be charged for this service\"     Consent has been obtained for this service by care team member: Yes     DATA  Interactive Complexity: No  Crisis: No      Progress Since Last Session (Related to Symptoms / Goals / Homework):   Symptoms: Improving : no more cying this time.    Homework: Achieved / completed to satisfaction      Episode of Care Goals: Achieved / completed to satisfaction - ACTION (Actively working towards change); Intervened by reinforcing change plan / affirming steps taken     Current / Ongoing Stressors and Concerns: Patient " continue to experience grief. She is also working toward acceptance. Shared how it is working. Has been keeping up with social interactions. Plans to visit her one brother in [place] at the end of the month for 2 weeks. Her  has been visiting him regularly as well and she hangs out with her friends and sister in law. Only her thumb seems to be the biggest issue for her. She is in contact with her PCP about the best care. Her next visit is in 2 weeks before her travel.      Treatment Objective(s) Addressed in This Session:   Grief/loss: continue to process thoughts and feelings around the loss. Working toward acceptance     Intervention:   Grief processing    Assessments completed prior to visit:10/0/2020    The following assessments were completed by patient for this visit:  PHQ2:   PHQ-2 ( 1999 [employer]) 3/8/2022 2/23/2022 1/5/2022 7/21/2021   Q1: Little interest or pleasure in doing things 0 0 0 0   Q2: Feeling down, depressed or hopeless 0 0 0 0   PHQ-2 Score 0 0 0 0   PHQ-2 Total Score (12-17 Years)- Positive if 3 or more points; Administer PHQ-A if positive - - - 0     PHQ9:   PHQ-9 SCORE 9/15/2021 9/29/2021 10/27/2021 11/17/2021 12/1/2021 12/15/2021 2/2/2022   PHQ-9 Total Score 0 0 0 0 0 0 0     GAD7:   CHAU-7 SCORE 11/17/2021 12/1/2021 12/15/2021 1/5/2022 2/2/2022 2/23/2022 3/8/2022   Total Score 0 0 0 0 0 0 0     CAGE-AID:   CAGE-AID Total Score 5/12/2021 7/22/2021 10/27/2021 2/2/2022 2/2/2022 3/8/2022 3/8/2022   Total Score 0 0 0 0 0 0 0     PROMIS 10-Global Health (all questions and answers displayed):   PROMIS 10 1/5/2022 3/8/2022   In general, would you say your health is: 3 3   In general, would you say your quality of life is: 3 3   In general, how would you rate your physical health? 3 3   In general, how would you rate your mental health, including your mood and your ability to think? 3 3   In general, how would you rate your satisfaction with your social activities and relationships? 3 3   In  general, please rate how well you carry out your usual social activities and roles. (This includes activities at home, at work and in your community, and responsibilities as a parent, child, spouse, employee, friend, etc.) 3 3   To what extent are you able to carry out your everyday physical activities such as walking, climbing stairs, carrying groceries, or moving a chair? 3 3   In the past 7 days, how often have you been bothered by emotional problems such as feeling anxious, depressed, or irritable? 1 3   In the past 7 days, how would you rate your fatigue on average? 3 3   In the past 7 days, how would you rate your pain on average, where 0 means no pain, and 10 means worst imaginable pain? 3 5   Global Mental Health Score 14 12   Global Physical Health Score 13 12   PROMIS TOTAL - SUBSCORES 27 24   Some recent data might be hidden     [place] Suicide Severity Rating Scale (Short Version)  [place] Suicide Severity Rating (Short Version) 2/4/2021 5/12/2021 7/22/2021 10/27/2021 2/2/2022 2/2/2022 3/8/2022   Over the past 2 weeks have you felt down, depressed, or hopeless? - - no no no no -   Over the past 2 weeks have you had thoughts of killing yourself? - - no no no no -   Have you ever attempted to kill yourself? - - no no no no -   1. Wish to be Dead (Since Last Contact) 0 0 - - - - 0   2. Non-Specific Active Suicidal Thoughts (Since Last Contact) 0 0 - - - - 0   Has subject engaged in non-suicidal self-injurious behavior? (Since Last Contact) - - - - - - 0   Interrupted Attempts (Since Last Contact) - - - - - - 0   Aborted or Self-Interrupted Attempt (Since Last Contact) - - - - - - 0   Preparatory Acts or Behavior (Since Last Contact) - - - - - - 0   Suicide (Since Last Contact) - - - - - - 0   Calculated C-SSRS Risk Score (Since Last Contact) No Risk Indicated No Risk Indicated - - - - No Risk Indicated      ASSESSMENT: Current Emotional / Mental Status (status of significant symptoms):   Risk status (Self  / Other harm or suicidal ideation)   Patient denies current fears or concerns for personal safety.   Patient denies current or recent suicidal ideation or behaviors.   Patient denies current or recent homicidal ideation or behaviors.   Patient denies current or recent self injurious behavior or ideation.   Patient denies other safety concerns.   Patient reports there has been no change in risk factors since their last session.     Patient reports there has been no change in protective factors since their last session.     Recommended that patient call 911 or go to the local ED should there be a change in any of these risk factors.     Appearance:                            not seen, not assessed               Eye Contact:                           Good               Psychomotor Behavior:          not assessed, not seen               Attitude:                                   Cooperative               Orientation:                             Person Place Time Situation              Speech                          Rate / Production:       Normal/ Responsive                          Volume:                       Normal               Mood:                                      Not assessed, not seen              Affect:                                      Appropriate               Thought Content:                    Clear               Thought Form:                        Coherent  Logical               Insight:                                     Good      Medication Review:   No current psychiatric medications prescribed     Medication Compliance:   NA     Allergies   Allergen Reactions     Iodine Unknown      Changes in Health Issues:   None reported     Chemical Use Review:   Substance Use: Chemical use reviewed, no active concerns identified      Tobacco Use: No current tobacco use.      Diagnosis:  1. Adjustment disorder with anxiety    2. Grief reaction      Collateral Reports Completed:   Routed note to  PCP    PLAN: (Patient Tasks / Therapist Tasks / Other)  Patient talk to her friends/family members  when she feels sad for support.  Patient will keep up with her walks when weather permits to distract herself and stay in shape  Patient will restructure her day using different options discussed to reduce loneliness she experiences in the evening.Patient will continue her reading in evenings to distract her mind from her loss  Patient's next visit is in 2 weeks    JOAQUIM Newell      Provider Oversight:  Dr. Dio Garza  _________________________________________________________________    Individual Treatment Plan    Patient's Name: Laine Sharma  YOB: 1933    Date of Creation:10/28/2020    Date Treatment Plan Last Reviewed/Revised: 2/02/2022    DSM5 Diagnoses: Adjustment Disorders  309.0 (F43.21) With depressed mood; Grief reaction [F43.21]  Psychosocial / Contextual Factors: Grief: lost  after over 40 years of marriage. Limited mobility to get doing.     PROMIS (reviewed every 90 days): 24- completed on 3/08/2022    Referral / Collaboration:  Referral to another professional/service is not indicated at this time..    Anticipated number of session for this episode of care: 12  Anticipation frequency of session: Biweekly  Anticipated Duration of each session: 38-52 minutes  Treatment plan will be reviewed in 90 days or when goals have been changed.     MeasurableTreatment Goal(s) related to diagnosis / functional impairment(s)  Goal 1: Patient will talk to her friend or sister daily for suppot    I will know I've met my goal when I don't cry every time I am alone.      Objective #A (Patient Action)    Patient will increase understanding of steps in the grief process.  Status: Continued - Date(s): 2/02/2022    Intervention(s)  Therapist will assign homework : go to [place] with neighbor friend.    Objective #B  Patient will increase understanding of steps in the grief process.  Status:  Continued - Date(s): 2/02/2022    Intervention(s)  Therapist will teach emotional regulation skills. asisting patient to create, collect memories about .    Objective #C  Patient will identify 3 strategies to more effectively address stressors.  Status: Continued - Date(s): 2/02/2022    Intervention(s)  Therapist will Continue to assist patient with looking for open grief suppot near her home.     Patient has reviewed and agreed to the above plan.    Rosa Stafford, [facility]  February, 02/2022

## 2022-03-09 ASSESSMENT — ANXIETY QUESTIONNAIRES: GAD7 TOTAL SCORE: 0

## 2022-03-11 ENCOUNTER — PATIENT OUTREACH (OUTPATIENT)
Dept: NURSING | Facility: CLINIC | Age: 87
End: 2022-03-11
Payer: COMMERCIAL

## 2022-03-11 ENCOUNTER — TELEPHONE (OUTPATIENT)
Dept: CARE COORDINATION | Facility: CLINIC | Age: 87
End: 2022-03-11

## 2022-03-11 NOTE — TELEPHONE ENCOUNTER
3/11/2022  Patient agreed to complete Preventative Care Visit/Medicare Annual Wellness visit  Pt requested if she can do it on 3-15-22 at her upcoming appt due to transportation.    Please advise

## 2022-03-11 NOTE — PROGRESS NOTES
3/11/2022  Clinic Care Coordination Contact  Community Health Worker Follow Up    Intervention and Education during outreach:     Called and spoke to patient and follow up on goal.  Patient reported:  -doing okay  -will be visiting brother in [place], [place] on 3-30-22 to 4-13-22.  -will continue to drive until there are other transportation available or until she is eligible for [employer] transportation.    Would like to go to the support group close to home.  Provided patient the contact information for [facility] [phone].    Patient will call Antione Ragsdale Facilitator [phone] at [facility] to register for summer session  Resources Grief Support Group  [email or web address]  [facility] group  Mondays at 6:30 pm   Antione Ragsdale facilitator  [phone]  [place] for this group   Next meeting   Monday, May 03, 2021   6:30 pm - 8:30 pm View meeting schedule   [place] [address]  Reminded of appt with PCP on 3-15-22 at 11:20am.  Patient requested if she get the Medicare Visit 3-15-22 when she comes in because she don't want to drive too much.    CHW sent telephone message to PCP Care Team Pool if she could do Medicare visit on 3-15-22 due to transportation.  If not she will schedule when she sees the doctor that day.    Pt in [place] 3-30-22 to 4-13-22  CHW Follow up: Monthly  CHW Plan: Follow up on goal   CHW Next Follow Up: 4-18-22    Brian Bojorquez  Community Health Worker  [facility]  Clinic Care Coordination  [email or web address]  [email or web address]   Office: [phone]  Fax: [phone]    Clinic Care Coordination Contact    Community Health Worker Follow Up    Care Gaps:     Health Maintenance Due   Topic Date Due     ZOSTER IMMUNIZATION (3 of 3) 12/02/2020     MEDICARE ANNUAL WELLNESS VISIT  09/03/2021       Care Gap Goal set: Yes    Goals:    Goals Addressed as of 3/11/2022 at 11:14 AM        Other (pt-stated)     Added 3/11/22 by Brian Bojorquez      Goal Statement: I will complete my Medicare Annual Wellness visit.  Date Goal set: 3/11/2022  Barriers: Transportation  Strengths: support from CCC team  Date to Achieve By: 5-2022  Patient expressed understanding of goal: yes  Action steps to achieve this goal:  1. If I can't do the Medicare annual visit on 3-15-22 then I will schedule it when I see the doctor on 3-15-22  2. I will attend my annual wellness visit.  3. I will contact my Care Management or clinic team if I have barriers to attending my annual wellness visit.

## 2022-03-15 ENCOUNTER — PATIENT OUTREACH (OUTPATIENT)
Dept: CARE COORDINATION | Facility: CLINIC | Age: 87
End: 2022-03-15
Payer: COMMERCIAL

## 2022-03-15 ENCOUNTER — OFFICE VISIT (OUTPATIENT)
Dept: FAMILY MEDICINE | Facility: CLINIC | Age: 87
End: 2022-03-15
Payer: COMMERCIAL

## 2022-03-15 VITALS
HEART RATE: 76 BPM | WEIGHT: 149 LBS | SYSTOLIC BLOOD PRESSURE: 129 MMHG | DIASTOLIC BLOOD PRESSURE: 73 MMHG | RESPIRATION RATE: 20 BRPM | BODY MASS INDEX: 31.14 KG/M2

## 2022-03-15 DIAGNOSIS — M18.12 ARTHRITIS OF CARPOMETACARPAL (CMC) JOINT OF LEFT THUMB: Primary | ICD-10-CM

## 2022-03-15 PROCEDURE — 99213 OFFICE O/P EST LOW 20 MIN: CPT | Performed by: FAMILY MEDICINE

## 2022-03-15 NOTE — LETTER
[facility]  Patient Centered Plan of Care  About Me:        Patient Name:  Laine Sharma    YOB: 1933  Age:         88 year old   Greg MRN:    [id number] Telephone Information:  Home Phone [phone]   Mobile [phone]       Address:  [address]  [place] [address] Email address:  No e-mail address on record      Emergency Contact(s)    Name Relationship Lgl Grd Work Phone Home Phone Mobile Phone   1. KYLER ALBERTO Sister-in-Law   [phone]    2. VALERIO SHARMA Spouse   [phone]            Primary language:  English     needed? No   [place] [facility]:  [phone] op. 1  Other communication barriers:No data recorded  Preferred Method of Communication:     Current living arrangement: I live alone    Mobility Status/ Medical Equipment: Independent        Health Maintenance  Health Maintenance Reviewed: Not assessed      My Access Plan  Medical Emergency 911   Primary Clinic Line [facility] [phone]   24 Hour Appointment Line [phone] or  [phone] ([phone]) (toll-free)   24 Hour Nurse Line [phone] (toll-free)   Preferred Urgent Care [facility] [phone]     Preferred Hospital [facility]  [phone]     Preferred Pharmacy [facility] [address] [place] [address] SO.     Behavioral Health Crisis Line The National Suicide Prevention Lifeline at 1-672.754.9197 or 611             My Care Team Members  Patient Care Team       Relationship Specialty Notifications Start End    Abdulkadir Rodriguez MD PCP - General   11/6/07     Phone: [phone] Fax: [phone]         [address] [place] [address]    Brian Bojorquez Community Health Worker Primary Care - [facility] Admissions 9/8/20     Phone: [phone] Fax: [phone]         [address] [place] [address]    Ugo Garica LICSW Lead Care Coordinator Primary Care - [facility] Admissions 9/8/20      Abdulkadir Rodriguez MD Assigned PCP   6/16/21     Phone: [phone] Fax: [phone]         [address] [place] [address]            My Care Plans  Self Management and Treatment Plan  Goals and (Comments)  Goals        General     Other (pt-stated)      Notes - Note created  3/11/2022 10:58 AM by Brian Bojorquez     Goal Statement: I will complete my Medicare Annual Wellness visit.  Date Goal set: 3/11/2022  Barriers: Transportation  Strengths: support from CCC team  Date to Achieve By: 5-2022  Patient expressed understanding of goal: yes  Action steps to achieve this goal:  1. If I can't do the Medicare annual visit on 3-15-22 then I will schedule it when I see the doctor on 3-15-22  2. I will attend my annual wellness visit.  3. I will contact my Care Management or clinic team if I have barriers to attending my annual wellness visit.                Action Plans on File:                       Advance Care Plans/Directives Type:   No data recorded    My Medical and Care Information  Problem List   Patient Active Problem List   Diagnosis     Diverticulosis     Esophageal reflux     Osteoarthritis     Hyperlipidemia     Essential hypertension     Healthcare maintenance     Pes anserine bursitis     Nodule of finger of left hand     Grief reaction     Asymptomatic menopausal state      Adjustment disorder with depressed mood     Rotator cuff tendonitis, right     Arthritis of carpometacarpal (CMC) joint of left thumb     Anemia, unspecified type      Current Medications and Allergies:  See printed Medication Report.    Care Coordination Start Date: 9/8/2020   Frequency of Care Coordination: monthly     Form Last Updated: 03/15/2022

## 2022-03-15 NOTE — PATIENT INSTRUCTIONS
Taking regular tylenol  325 mg  2 pills 3-4 times per day is safe  It is what I recommend for your thumb arthritis    Also, wear the splint that has the metal thumb bar- use band aid for padding

## 2022-03-15 NOTE — PROGRESS NOTES
Assessment/ Plan  1. Arthritis of carpometacarpal (CMC) joint of left thumb  Ongoing symptoms of pain  Recommend  Thumb spica splint.  Patient has 1 but has not been wearing it since it pushes on her painful joint.  She did try putting a Band-Aid on there to pad that and it was helpful.  She came in wearing a carpal tunnel splint today -I discussed that this is not adequate to protect her thumb  Discussed option of going back to orthopedics to get additional intra-articular injection.  She wishes to avoid the  Recommend increasing Tylenol dosage.  Currently taking 325 mg daily, discussed that she can go up to 650 3 times daily possibly 4 times a day.  Body mass index is 31.14 kg/m .    Subjective  CC:  chief complaint  HPI:  Ongoing thumb pain.  Has had this for a couple of years.  Seen orthopedics, received thumb injection.  Has spica splint at home.  Apparently this causes more pain than splint she is currently wearing so she is not wearing them.  Splint she is currently wearing does not include spica portion.    I had been concerned about memory.  Seems completely lucid today.  Patient Active Problem List   Diagnosis     Diverticulosis     Esophageal reflux     Osteoarthritis     Hyperlipidemia     Essential hypertension     Healthcare maintenance     Pes anserine bursitis     Nodule of finger of left hand     Grief reaction     Asymptomatic menopausal state      Adjustment disorder with depressed mood     Rotator cuff tendonitis, right     Arthritis of carpometacarpal (CMC) joint of left thumb     Anemia, unspecified type     Current medications reviewed as follows:  acetaminophen (TYLENOL) 325 MG tablet, [ACETAMINOPHEN (TYLENOL) 325 MG TABLET] Take 650 mg by mouth every 6 (six) hours as needed for pain.  amLODIPine (NORVASC) 10 MG tablet, [AMLODIPINE (NORVASC) 10 MG TABLET] Take 1 tablet by mouth once daily  escitalopram (LEXAPRO) 5 MG tablet, Take 1 tablet (5 mg) by mouth daily  hydrochlorothiazide  (HYDRODIURIL) 25 MG tablet, Take 1 tablet (25 mg) by mouth daily  losartan (COZAAR) 100 MG tablet, Take 1 tablet (100 mg) by mouth daily  valsartan (DIOVAN) 80 MG tablet, Take 1 tablet (80 mg) by mouth daily    No current facility-administered medications on file prior to visit.     History   Smoking Status     Never Smoker   Smokeless Tobacco     Never Used     Social History     Social History Narrative    Patient is  to Dom  Moved to [facility] house in 2016       Patient Care Team:  Abdulkadir Rodriguez MD as PCP - Brian Beckman as Community Health Worker (Primary Care - [facility])  Ugo Garcia LICSW as Lead Care Coordinator (Primary Care - [facility])  Abdulkadir Rodriguez MD as Assigned PCP  ROS  As above      Objective  Physical Exam  Vitals:    03/15/22 1119   BP: 129/73   BP Location: Left arm   Patient Position: Sitting   Cuff Size: Adult Regular   Pulse: 76   Resp: 20   Weight: 67.6 kg (149 lb)     Pain on MCP as well as CCP joints of hand  Diagnostics  None    Please note: Voice recognition software was used in this dictation.  It may therefore contain typographical errors.

## 2022-03-28 ENCOUNTER — VIRTUAL VISIT (OUTPATIENT)
Dept: BEHAVIORAL HEALTH | Facility: HOSPITAL | Age: 87
End: 2022-03-28
Payer: COMMERCIAL

## 2022-03-28 DIAGNOSIS — F43.22 ADJUSTMENT DISORDER WITH ANXIETY: Primary | ICD-10-CM

## 2022-03-28 PROCEDURE — 90834 PSYTX W PT 45 MINUTES: CPT | Mod: 95 | Performed by: SOCIAL WORKER

## 2022-03-28 ASSESSMENT — PATIENT HEALTH QUESTIONNAIRE - PHQ9: 5. POOR APPETITE OR OVEREATING: NOT AT ALL

## 2022-03-28 ASSESSMENT — ANXIETY QUESTIONNAIRES
3. WORRYING TOO MUCH ABOUT DIFFERENT THINGS: NOT AT ALL
7. FEELING AFRAID AS IF SOMETHING AWFUL MIGHT HAPPEN: NOT AT ALL
GAD7 TOTAL SCORE: 1
5. BEING SO RESTLESS THAT IT IS HARD TO SIT STILL: NOT AT ALL
IF YOU CHECKED OFF ANY PROBLEMS ON THIS QUESTIONNAIRE, HOW DIFFICULT HAVE THESE PROBLEMS MADE IT FOR YOU TO DO YOUR WORK, TAKE CARE OF THINGS AT HOME, OR GET ALONG WITH OTHER PEOPLE: NOT DIFFICULT AT ALL
1. FEELING NERVOUS, ANXIOUS, OR ON EDGE: SEVERAL DAYS
6. BECOMING EASILY ANNOYED OR IRRITABLE: NOT AT ALL
2. NOT BEING ABLE TO STOP OR CONTROL WORRYING: NOT AT ALL

## 2022-03-28 NOTE — PROGRESS NOTES
"    [facility]                                     Progress Note    Patient Name: Laine Sharma  Date:3/28/2022         Service Type: Individual      Session Start Time: 9:10  Session End Time: 9:55     Session Length: 45    Session #: 30    Attendees: Client    Service Modality:  Phone Visit:      Provider verified identity through the following two step process.  Patient provided:  Patient is known previously to provider    The patient has been notified of the following:      \"We have found that certain health care needs can be provided without the need for a face to face visit.  This service lets us provide the care you need with a phone conversation.       I will have full access to your [facility] medical record during this entire phone call.   I will be taking notes for your medical record.      Since this is like an office visit, we will bill your insurance company for this service.       There are potential benefits and risks of telephone visits (e.g. limits to patient confidentiality) that differ from in-person visits.?Confidentiality still applies for telephone services, and nobody will record the visit.  It is important to be in a quiet, private space that is free of distractions (including cell phone or other devices) during the visit.??      If during the course of the call I believe a telephone visit is not appropriate, you will not be charged for this service\"     Consent has been obtained for this service by care team member: Yes     DATA  Interactive Complexity: No  Crisis: No      Progress Since Last Session (Related to Symptoms / Goals / Homework):   Symptoms: Improving : appears to be achieving her level of acceptance    Homework: Achieved / completed to satisfaction      Episode of Care Goals: Achieved / completed to satisfaction - ACTION (Actively working towards change); Intervened by reinforcing change plan / affirming steps taken     Current / Ongoing Stressors and " Concerns: Patient reports no change on her feelings. She still notes her hardest times are at late in evenings. Has been reading and keeping self busy with her cat and sometimes talking to a friend or a family member. She also shared her thumb still bothers her. She is going to see her PCP before traveling to [place] to see her only brother. She has been looking into this time to reunite with brother for sometime now. She is also prepared to possibly have some issues with his daughter. She has been difficult to communicate with as thoughts to be dictating brother about what to do. She is going to address the piece of furniture that he has reclaimed after patient's  passed away.  She will make sure this doesn't ruin her visit with brother. She will return on 4/18.       Treatment Objective(s) Addressed in This Session:   identify at 2 strategies to more effectively address stressors. Working toward acceptance     Intervention:   Grief processing; problem solving    Assessments completed prior to visit:10/10/2020    The following assessments were completed by patient for this visit:  PHQ2:   PHQ-2 ( 1999 [employer]) 3/28/2022 3/8/2022 2/23/2022 1/5/2022 7/21/2021   Q1: Little interest or pleasure in doing things 0 0 0 0 0   Q2: Feeling down, depressed or hopeless 0 0 0 0 0   PHQ-2 Score 0 0 0 0 0   PHQ-2 Total Score (12-17 Years)- Positive if 3 or more points; Administer PHQ-A if positive - - - - 0     PHQ9:   PHQ-9 SCORE 9/15/2021 9/29/2021 10/27/2021 11/17/2021 12/1/2021 12/15/2021 2/2/2022   PHQ-9 Total Score 0 0 0 0 0 0 0     GAD7:   CHAU-7 SCORE 12/1/2021 12/15/2021 1/5/2022 2/2/2022 2/23/2022 3/8/2022 3/28/2022   Total Score 0 0 0 0 0 0 1     CAGE-AID:   CAGE-AID Total Score 5/12/2021 7/22/2021 10/27/2021 2/2/2022 2/2/2022 3/8/2022 3/8/2022   Total Score 0 0 0 0 0 0 0     PROMIS 10-Global Health (all questions and answers displayed):   PROMIS 10 1/5/2022 3/8/2022   In general, would you say your health is: 3 3   In  general, would you say your quality of life is: 3 3   In general, how would you rate your physical health? 3 3   In general, how would you rate your mental health, including your mood and your ability to think? 3 3   In general, how would you rate your satisfaction with your social activities and relationships? 3 3   In general, please rate how well you carry out your usual social activities and roles. (This includes activities at home, at work and in your community, and responsibilities as a parent, child, spouse, employee, friend, etc.) 3 3   To what extent are you able to carry out your everyday physical activities such as walking, climbing stairs, carrying groceries, or moving a chair? 3 3   In the past 7 days, how often have you been bothered by emotional problems such as feeling anxious, depressed, or irritable? 1 3   In the past 7 days, how would you rate your fatigue on average? 3 3   In the past 7 days, how would you rate your pain on average, where 0 means no pain, and 10 means worst imaginable pain? 3 5   Global Mental Health Score 14 12   Global Physical Health Score 13 12   PROMIS TOTAL - SUBSCORES 27 24   Some recent data might be hidden     [place] Suicide Severity Rating Scale (Short Version)  [place] Suicide Severity Rating (Short Version) 2/4/2021 5/12/2021 7/22/2021 10/27/2021 2/2/2022 2/2/2022 3/8/2022   Over the past 2 weeks have you felt down, depressed, or hopeless? - - no no no no -   Over the past 2 weeks have you had thoughts of killing yourself? - - no no no no -   Have you ever attempted to kill yourself? - - no no no no -   1. Wish to be Dead (Since Last Contact) 0 0 - - - - 0   2. Non-Specific Active Suicidal Thoughts (Since Last Contact) 0 0 - - - - 0   Has subject engaged in non-suicidal self-injurious behavior? (Since Last Contact) - - - - - - 0   Interrupted Attempts (Since Last Contact) - - - - - - 0   Aborted or Self-Interrupted Attempt (Since Last Contact) - - - - - - 0    Preparatory Acts or Behavior (Since Last Contact) - - - - - - 0   Suicide (Since Last Contact) - - - - - - 0   Calculated C-SSRS Risk Score (Since Last Contact) No Risk Indicated No Risk Indicated - - - - No Risk Indicated      ASSESSMENT: Current Emotional / Mental Status (status of significant symptoms):   Risk status (Self / Other harm or suicidal ideation)   Patient denies current fears or concerns for personal safety.   Patient denies current or recent suicidal ideation or behaviors.   Patient denies current or recent homicidal ideation or behaviors.   Patient denies current or recent self injurious behavior or ideation.   Patient denies other safety concerns.   Patient reports there has been no change in risk factors since their last session.     Patient reports there has been no change in protective factors since their last session.     Recommended that patient call 911 or go to the local ED should there be a change in any of these risk factors.     Appearance:                            not seen, not assessed               Eye Contact:                           Good               Psychomotor Behavior:          not assessed, not seen               Attitude:                                   Cooperative               Orientation:                             Person Place Time Situation              Speech                          Rate / Production:       Normal/ Responsive                          Volume:                       Normal               Mood:                                      Not assessed, not seen              Affect:                                      Appropriate               Thought Content:                    Clear               Thought Form:                        Coherent  Logical               Insight:                                     Good      Medication Review:   No current psychiatric medications prescribed     Medication Compliance:   NA     Allergies   Allergen Reactions      Iodine Unknown      Changes in Health Issues:   None reported     Chemical Use Review:   Substance Use: Chemical use reviewed, no active concerns identified      Tobacco Use: No current tobacco use.      Diagnosis:  1. Adjustment disorder with anxiety      Collateral Reports Completed:   Routed note to PCP    PLAN: (Patient Tasks / Therapist Tasks / Other)  Patient talk to her friends/family members  when she feels sad for support.  Patient will keep up with her walks when weather permits to distract herself and stay in shape  Patient will continue her reading in evenings to distract her mind from her loss  Patient will complete her travel preparation including seeing her PCP tomorrow   Patient's next appt is on 4/18/2022 after her return from her trip     [facility]      Provider Oversight:  Dr. Dio Garza  _________________________________________________________________    Individual Treatment Plan    Patient's Name: Laine Sharma  YOB: 1933    Date of Creation:10/28/2020    Date Treatment Plan Last Reviewed/Revised: 2/02/2022    DSM5 Diagnoses: Adjustment Disorders  309.0 (F43.21) With depressed mood; Grief reaction [F43.21]  Psychosocial / Contextual Factors: Grief: lost  after over 40 years of marriage. Limited mobility to get doing.     PROMIS (reviewed every 90 days): 24- completed on 3/08/2022    Referral / Collaboration:  Referral to another professional/service is not indicated at this time..    Anticipated number of session for this episode of care: 12  Anticipation frequency of session: Biweekly  Anticipated Duration of each session: 38-52 minutes  Treatment plan will be reviewed in 90 days or when goals have been changed.     MeasurableTreatment Goal(s) related to diagnosis / functional impairment(s)  Goal 1: Patient will talk to her friend or sister daily for suppot    I will know I've met my goal when I don't cry every time I am alone.      Objective #A (Patient  Action)    Patient will increase understanding of steps in the grief process.  Status: Continued - Date(s): 2/02/2022    Intervention(s)  Therapist will assign homework : go to [place] with neighbor friend.    Objective #B  Patient will increase understanding of steps in the grief process.  Status: Continued - Date(s): 2/02/2022    Intervention(s)  Therapist will teach emotional regulation skills. asisting patient to create, collect memories about .    Objective #C  Patient will identify 3 strategies to more effectively address stressors.  Status: Continued - Date(s): 2/02/2022    Intervention(s)  Therapist will Continue to assist patient with looking for open grief suppot near her home.     Patient has reviewed and agreed to the above plan.    JOAQUIM Newell  February, 02/2022

## 2022-03-29 ENCOUNTER — OFFICE VISIT (OUTPATIENT)
Dept: FAMILY MEDICINE | Facility: CLINIC | Age: 87
End: 2022-03-29
Payer: COMMERCIAL

## 2022-03-29 VITALS
SYSTOLIC BLOOD PRESSURE: 130 MMHG | RESPIRATION RATE: 18 BRPM | BODY MASS INDEX: 30.93 KG/M2 | DIASTOLIC BLOOD PRESSURE: 77 MMHG | HEART RATE: 80 BPM | WEIGHT: 148 LBS

## 2022-03-29 DIAGNOSIS — M18.12 ARTHRITIS OF CARPOMETACARPAL (CMC) JOINT OF LEFT THUMB: Primary | ICD-10-CM

## 2022-03-29 PROCEDURE — 99213 OFFICE O/P EST LOW 20 MIN: CPT | Performed by: FAMILY MEDICINE

## 2022-03-29 ASSESSMENT — ANXIETY QUESTIONNAIRES: GAD7 TOTAL SCORE: 1

## 2022-03-29 NOTE — PROGRESS NOTES
Assessment/ Plan  1. Arthritis of carpometacarpal (CMC) joint of left thumb  Patient has 2 of the wrong kind of splints-both [place] wrist splint  Needs thumb spica splint to help protect her CMC and MP joints  Prescription written  Discussed the     Body mass index is 30.93 kg/m .    Subjective  CC:  chief complaint  HPI:  88-year-old  Continues to complain of thumb pain  I was under the impression last time that she had a thumb spica splint as well as a [place] wrist splint.  Instructed to use the thumb spica splint  She brings in her other splint today, they are both [place] type wrist splint  Has pain when she does things  See previous discussion  Patient Active Problem List   Diagnosis     Diverticulosis     Esophageal reflux     Osteoarthritis     Hyperlipidemia     Essential hypertension     Healthcare maintenance     Pes anserine bursitis     Nodule of finger of left hand     Grief reaction     Asymptomatic menopausal state      Adjustment disorder with depressed mood     Rotator cuff tendonitis, right     Arthritis of carpometacarpal (CMC) joint of left thumb     Anemia, unspecified type     Current medications reviewed as follows:  acetaminophen (TYLENOL) 325 MG tablet, [ACETAMINOPHEN (TYLENOL) 325 MG TABLET] Take 650 mg by mouth every 6 (six) hours as needed for pain.  amLODIPine (NORVASC) 10 MG tablet, [AMLODIPINE (NORVASC) 10 MG TABLET] Take 1 tablet by mouth once daily  escitalopram (LEXAPRO) 5 MG tablet, Take 1 tablet (5 mg) by mouth daily  hydrochlorothiazide (HYDRODIURIL) 25 MG tablet, Take 1 tablet (25 mg) by mouth daily  losartan (COZAAR) 100 MG tablet, Take 1 tablet (100 mg) by mouth daily  valsartan (DIOVAN) 80 MG tablet, Take 1 tablet (80 mg) by mouth daily    No current facility-administered medications on file prior to visit.     History   Smoking Status     Never Smoker   Smokeless Tobacco     Never Used     Social History     Social History Narrative    Patient is  to Dom  [place]  [place] house in [place]       Patient Care Team:  Abdulkadir Rodriguze MD as PCP - Brian Beckman as Community Health Worker (Primary Care - CC)  Ugo Garcia LICSW as Lead Care Coordinator (Primary Care - CC)  Abdulkadir Rodriguez MD as Assigned PCP  ROS  As above      Objective  Physical Exam  Vitals:    03/29/22 1609   BP: 130/77   BP Location: Left arm   Patient Position: Sitting   Cuff Size: Adult Regular   Pulse: 80   Resp: 18   Weight: 67.1 kg (148 lb)     Patient has hypertrophic changes about the CMC and MP joints of left thumb  Diagnostics  None    Please note: Voice recognition software was used in this dictation.  It may therefore contain typographical errors.

## 2022-04-18 ENCOUNTER — PATIENT OUTREACH (OUTPATIENT)
Dept: CARE COORDINATION | Facility: CLINIC | Age: 87
End: 2022-04-18
Payer: COMMERCIAL

## 2022-04-18 NOTE — PROGRESS NOTES
4/18/2022  Clinic Care Coordination Contact  [facility]/Voicemail    Clinical Data: Care Coordinator Outreach  Outreach attempted x 1.  Left message on patient's voicemail with call back information and requested return call.  Plan: Care Coordinator  will try to reach patient again in 10 business days.    CHW follow up: 5-2-22    Brian Bojorquez  Community Health Worker  [facility]  Clinic Care Coordination  [email or web address]   Office: [phone]  Fax: [phone]

## 2022-04-20 ENCOUNTER — VIRTUAL VISIT (OUTPATIENT)
Dept: BEHAVIORAL HEALTH | Facility: HOSPITAL | Age: 87
End: 2022-04-20
Attending: PSYCHIATRY & NEUROLOGY
Payer: COMMERCIAL

## 2022-04-20 DIAGNOSIS — F43.21 GRIEF REACTION: Primary | ICD-10-CM

## 2022-04-20 PROCEDURE — 90834 PSYTX W PT 45 MINUTES: CPT | Mod: 95 | Performed by: SOCIAL WORKER

## 2022-04-20 ASSESSMENT — ANXIETY QUESTIONNAIRES

## 2022-04-20 ASSESSMENT — PATIENT HEALTH QUESTIONNAIRE - PHQ9: 5. POOR APPETITE OR OVEREATING: NOT AT ALL

## 2022-04-20 NOTE — PROGRESS NOTES
"    [facility]                                     Progress Note    Patient Name: Laine Sharma  Date:4/20/2022         Service Type: Individual      Session Start Time: 10:03  Session End Time:10:50     Session Length: 47    Session #: 31    Attendees: Client    Service Modality:  Phone Visit:      Provider verified identity through the following two step process.  Patient provided:  Patient is known previously to provider    The patient has been notified of the following:      \"We have found that certain health care needs can be provided without the need for a face to face visit.  This service lets us provide the care you need with a phone conversation.       I will have full access to your [facility] medical record during this entire phone call.   I will be taking notes for your medical record.      Since this is like an office visit, we will bill your insurance company for this service.       There are potential benefits and risks of telephone visits (e.g. limits to patient confidentiality) that differ from in-person visits.?Confidentiality still applies for telephone services, and nobody will record the visit.  It is important to be in a quiet, private space that is free of distractions (including cell phone or other devices) during the visit.??      If during the course of the call I believe a telephone visit is not appropriate, you will not be charged for this service\"     Consent has been obtained for this service by care team member: Yes     DATA  Interactive Complexity: No  Crisis: No      Progress Since Last Session (Related to Symptoms / Goals / Homework):   Symptoms: Improving : appears to be achieving her level of acceptance    Homework: Achieved / completed to satisfaction      Episode of Care Goals: Achieved / completed to satisfaction - ACTION (Actively working towards change); Intervened by reinforcing change plan / affirming steps taken     Current / Ongoing Stressors and " Concerns: Patient has been back to home since last week. Was visiting brother in [place]. She is convinced she needs to move to be near him due to being lonely here. Her best friend moved away to  be closer to family. She is to think about this move and weight the pros and cons of this move. Patient has a good support from   family and friends here in [place]. She knows her living environment, has a well established care; and goes the same [employer] for a long time.She would have to start over with everything. She will have to sell her home and buy a new one; something that might not be a simple task to navigate.Still, she would still live on her own even when she moves to [place]. Should she buy a new home, still brother has limited mobility. He might not seen her as much. Patient however might be able to visit her brother regularly and return to [place]. The thumb is still hurting. She is to schedule to see her PCP. Not clear if her [employer] can provide medical transportation should she have a surgery. Patient will check with her care coordination team. Her next visit is in 3 weeks.      Treatment Objective(s) Addressed in This Session:   identify at 2 strategies to more effectively address stressors. Working toward acceptance     Intervention:   Grief processing; problem solving    Assessments completed prior to visit:10/10/2020    The following assessments were completed by patient for this visit:  PHQ2:   PHQ-2 ( 1999 [employer]) 4/20/2022 3/28/2022 3/8/2022 2/23/2022 1/5/2022 7/21/2021   Q1: Little interest or pleasure in doing things 0 0 0 0 0 0   Q2: Feeling down, depressed or hopeless 0 0 0 0 0 0   PHQ-2 Score 0 0 0 0 0 0   PHQ-2 Total Score (12-17 Years)- Positive if 3 or more points; Administer PHQ-A if positive - - - - - 0     PHQ9:   PHQ-9 SCORE 9/15/2021 9/29/2021 10/27/2021 11/17/2021 12/1/2021 12/15/2021 2/2/2022   PHQ-9 Total Score 0 0 0 0 0 0 0     GAD7:   CHAU-7 SCORE 12/15/2021 1/5/2022 2/2/2022 2/23/2022 3/8/2022 3/28/2022  4/20/2022   Total Score 0 0 0 0 0 1 0     CAGE-AID:   CAGE-AID Total Score 5/12/2021 7/22/2021 10/27/2021 2/2/2022 2/2/2022 3/8/2022 3/8/2022   Total Score 0 0 0 0 0 0 0     PROMIS 10-Global Health (all questions and answers displayed):   PROMIS 10 1/5/2022 3/8/2022   In general, would you say your health is: 3 3   In general, would you say your quality of life is: 3 3   In general, how would you rate your physical health? 3 3   In general, how would you rate your mental health, including your mood and your ability to think? 3 3   In general, how would you rate your satisfaction with your social activities and relationships? 3 3   In general, please rate how well you carry out your usual social activities and roles. (This includes activities at home, at work and in your community, and responsibilities as a parent, child, spouse, employee, friend, etc.) 3 3   To what extent are you able to carry out your everyday physical activities such as walking, climbing stairs, carrying groceries, or moving a chair? 3 3   In the past 7 days, how often have you been bothered by emotional problems such as feeling anxious, depressed, or irritable? 1 3   In the past 7 days, how would you rate your fatigue on average? 3 3   In the past 7 days, how would you rate your pain on average, where 0 means no pain, and 10 means worst imaginable pain? 3 5   Global Mental Health Score 14 12   Global Physical Health Score 13 12   PROMIS TOTAL - SUBSCORES 27 24   Some recent data might be hidden     [place] Suicide Severity Rating Scale (Short Version)  [place] Suicide Severity Rating (Short Version) 2/4/2021 5/12/2021 7/22/2021 10/27/2021 2/2/2022 2/2/2022 3/8/2022   Over the past 2 weeks have you felt down, depressed, or hopeless? - - no no no no -   Over the past 2 weeks have you had thoughts of killing yourself? - - no no no no -   Have you ever attempted to kill yourself? - - no no no no -   1. Wish to be Dead (Since Last Contact) 0 0 - -  - - 0   2. Non-Specific Active Suicidal Thoughts (Since Last Contact) 0 0 - - - - 0   Has subject engaged in non-suicidal self-injurious behavior? (Since Last Contact) - - - - - - 0   Interrupted Attempts (Since Last Contact) - - - - - - 0   Aborted or Self-Interrupted Attempt (Since Last Contact) - - - - - - 0   Preparatory Acts or Behavior (Since Last Contact) - - - - - - 0   Suicide (Since Last Contact) - - - - - - 0   Calculated C-SSRS Risk Score (Since Last Contact) No Risk Indicated No Risk Indicated - - - - No Risk Indicated      ASSESSMENT: Current Emotional / Mental Status (status of significant symptoms):   Risk status (Self / Other harm or suicidal ideation)   Patient denies current fears or concerns for personal safety.   Patient denies current or recent suicidal ideation or behaviors.   Patient denies current or recent homicidal ideation or behaviors.   Patient denies current or recent self injurious behavior or ideation.   Patient denies other safety concerns.   Patient reports there has been no change in risk factors since their last session.     Patient reports there has been no change in protective factors since their last session.     Recommended that patient call 911 or go to the local ED should there be a change in any of these risk factors.     Appearance:                            not seen, not assessed               Eye Contact:                           Good               Psychomotor Behavior:          not assessed, not seen               Attitude:                                   Cooperative               Orientation:                             Person Place Time Situation              Speech                          Rate / Production:       Normal/ Responsive                          Volume:                       Normal               Mood:                                      Not assessed, not seen              Affect:                                      Appropriate               Thought  Content:                    Clear               Thought Form:                        Coherent  Logical               Insight:                                     Good      Medication Review:   No current psychiatric medications prescribed     Medication Compliance:   NA     Allergies   Allergen Reactions     Iodine Unknown      Changes in Health Issues:   None reported     Chemical Use Review:   Substance Use: Chemical use reviewed, no active concerns identified      Tobacco Use: No current tobacco use.      Diagnosis:  1. Grief reaction      Collateral Reports Completed:   Routed note to PCP    PLAN: (Patient Tasks / Therapist Tasks / Other): reviewed today 4/20/2022  Patient talk to her friends/family members  when she feels sad for support.  Patient will keep up with her walks when weather permits to distract herself and stay in shape.  Patient will continue her reading in evenings to distract her mind from her loss  Patient will reflect on today's discussion about moving to [place]   Patient's next appt is on 5/04/2022    JOAQUIM Newell  _________________________________________________________________    Individual Treatment Plan    Patient's Name: Laine Sharma  YOB: 1933    Date of Creation:10/28/2020    Date Treatment Plan Last Reviewed/Revised: 2/02/2022    DSM5 Diagnoses: Adjustment Disorders  309.0 (F43.21) With depressed mood; Grief reaction [F43.21]  Psychosocial / Contextual Factors: Grief: lost  after over 40 years of marriage. Limited mobility to get doing.     PROMIS (reviewed every 90 days): 24- completed on 3/08/2022    Referral / Collaboration:  Referral to another professional/service is not indicated at this time..    Anticipated number of session for this episode of care: 12  Anticipation frequency of session: Biweekly  Anticipated Duration of each session: 38-52 minutes  Treatment plan will be reviewed in 90 days or when goals have been changed.      MeasurableTreatment Goal(s) related to diagnosis / functional impairment(s)  Goal 1: Patient will talk to her friend or sister daily for suppot    I will know I've met my goal when I don't cry every time I am alone.      Objective #A (Patient Action)    Patient will increase understanding of steps in the grief process.  Status: Continued - Date(s): 2/02/2022    Intervention(s)  Therapist will assign homework : go to [place] with neighbor friend.    Objective #B  Patient will increase understanding of steps in the grief process.  Status: Continued - Date(s): 2/02/2022    Intervention(s)  Therapist will teach emotional regulation skills. asisting patient to create, collect memories about .    Objective #C  Patient will identify 3 strategies to more effectively address stressors.  Status: Continued - Date(s): 2/02/2022    Intervention(s)  Therapist will Continue to assist patient with looking for open grief suppot near her home.     Patient has reviewed and agreed to the above plan.    JOAQUIM Newell  February, 02/2022

## 2022-04-21 ASSESSMENT — ANXIETY QUESTIONNAIRES: GAD7 TOTAL SCORE: 0

## 2022-05-02 ENCOUNTER — PATIENT OUTREACH (OUTPATIENT)
Dept: NURSING | Facility: CLINIC | Age: 87
End: 2022-05-02
Payer: COMMERCIAL

## 2022-05-02 NOTE — TELEPHONE ENCOUNTER
5/2/2022  Wanted to clarify if patient completed Medicare Annual Visit on 3-15-22  Patient thought she did on 3-15-22 she said to do on that day due to transportation.  If not, she is coming in this Wed 5-4-22 to see Dr Rodriguez she would like to schedule Medicare Wellness Visit on that day.    Please support patient
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
## 2022-05-04 ENCOUNTER — OFFICE VISIT (OUTPATIENT)
Dept: FAMILY MEDICINE | Facility: CLINIC | Age: 87
End: 2022-05-04
Payer: COMMERCIAL

## 2022-05-04 VITALS
OXYGEN SATURATION: 95 % | WEIGHT: 148 LBS | HEART RATE: 80 BPM | BODY MASS INDEX: 30.93 KG/M2 | SYSTOLIC BLOOD PRESSURE: 126 MMHG | DIASTOLIC BLOOD PRESSURE: 68 MMHG | RESPIRATION RATE: 20 BRPM

## 2022-05-04 DIAGNOSIS — Z00.00 ENCOUNTER FOR MEDICARE ANNUAL WELLNESS EXAM: ICD-10-CM

## 2022-05-04 DIAGNOSIS — Z00.00 HEALTHCARE MAINTENANCE: Primary | ICD-10-CM

## 2022-05-04 DIAGNOSIS — M18.12 ARTHRITIS OF CARPOMETACARPAL (CMC) JOINT OF LEFT THUMB: ICD-10-CM

## 2022-05-04 DIAGNOSIS — I10 ESSENTIAL HYPERTENSION: ICD-10-CM

## 2022-05-04 PROCEDURE — 91305 COVID-19,PF,PFIZER (12+ YRS): CPT | Performed by: FAMILY MEDICINE

## 2022-05-04 PROCEDURE — 99397 PER PM REEVAL EST PAT 65+ YR: CPT | Mod: 25 | Performed by: FAMILY MEDICINE

## 2022-05-04 PROCEDURE — 90471 IMMUNIZATION ADMIN: CPT | Performed by: FAMILY MEDICINE

## 2022-05-04 PROCEDURE — 90750 HZV VACC RECOMBINANT IM: CPT | Performed by: FAMILY MEDICINE

## 2022-05-04 PROCEDURE — 0054A COVID-19,PF,PFIZER (12+ YRS): CPT | Performed by: FAMILY MEDICINE

## 2022-05-04 ASSESSMENT — ACTIVITIES OF DAILY LIVING (ADL): CURRENT_FUNCTION: NO ASSISTANCE NEEDED

## 2022-05-04 NOTE — PATIENT INSTRUCTIONS
Patient Education   Personalized Prevention Plan  You are due for the preventive services outlined below.  Your care team is available to assist you in scheduling these services.  If you have already completed any of these items, please share that information with your care team to update in your medical record.  Health Maintenance Due   Topic Date Due     Diptheria Tetanus Pertussis (DTAP/TDAP/TD) Vaccine (1 - Tdap) 10/02/2018

## 2022-05-04 NOTE — ASSESSMENT & PLAN NOTE
Completed POLST form with her today  Julia Guevara would be her medical agent..  Her biological son lives in [place]  Living alone, doing reasonably well  She is not concerned about memory, still doing I ADLs  No falls  Driving locally  No incidences

## 2022-05-04 NOTE — PROGRESS NOTES
SUBJECTIVE:   Laine Sharma is a 88 year old female who presents for Preventive Visit.  She has no new concerns today except ongoing pain in her left thumb.  Extensive notes about this in the past.  Still does not have thumb spica splint.    Patient has been advised of split billing requirements and indicates understanding: Yes  Are you in the first 12 months of your Medicare coverage?  No    HPI  Do you feel safe in your environment? Yes    Have you ever done Advance Care Planning? (For example, a Health Directive, POLST, or a discussion with a medical provider or your loved ones about your wishes): Yes, advance care planning is on file.      Fall risk  Fallen 2 or more times in the past year?: No  Any fall with injury in the past year?: No  click delete button to remove this line now  Cognitive Screening   1) Repeat 3 items (Leader, Season, Table)    2) Clock draw: ABNORMAL 1/2  3) 3 item recall: Recalls 2 objects   Results: ABNORMAL clock, 1-2 items recalled: PROBABLE COGNITIVE IMPAIRMENT, **INFORM PROVIDER**  Doing well otherwise.  Discussed cognitive issues.  Never has been lost driving  Keeps bills up-to-date, shops for herself  Indicates that she is doing well.  Denies that other are worried about her  Able to answer questions appropriately  Has done well in recent interviews with my adapted Mini-Mental status.  Will continue to follow  Mini-CogTM Leola Maki. Licensed by the author for use in [facility]; reprinted with permission ([email or web address]). All rights reserved.      Do you have sleep apnea, excessive snoring or daytime drowsiness?: no    Reviewed and updated as needed this visit by clinical staff   Tobacco  Allergies  Meds                Reviewed and updated as needed this visit by Provider                   Social History     Tobacco Use     Smoking status: Never Smoker     Smokeless tobacco: Never Used   Substance Use Topics     Alcohol use: No     If you drink alcohol do you  "typically have >3 drinks per day or >7 drinks per week? No    Alcohol Use 5/4/2022   Prescreen: >3 drinks/day or >7 drinks/week? No       Patient Care Team:  Abdulkadir Rodriguez MD as PCP - Brian Beckman as Community Health Worker (Primary Care - CC)  Ugo Garcia LICSW as Lead Care Coordinator (Primary Care - CC)  Abdulkadir Rodriguez MD as Assigned PCP    The following health maintenance items are reviewed in Epic and correct as of today:    Full 10 system review including constitutional, respiratory, cardiac, gi, urinary, rheumatologic, neurologic, reproductive, dermatologic psychiatric is  performed  and is otherwise negative     OBJECTIVE:   /68 (BP Location: Right arm, Patient Position: Sitting, Cuff Size: Adult Large)   Pulse 80   Resp 20   Wt 67.1 kg (148 lb)   SpO2 95%   BMI 30.93 kg/m   Estimated body mass index is 30.93 kg/m  as calculated from the following:    Height as of 3/5/21: 1.473 m (4' 10\").    Weight as of this encounter: 67.1 kg (148 lb).  Physical Exam  Gen- alert, oriented/ appropriately responsive  HEENT- normal cephalic, atraumatic.   Chest- Normal inspiration and expiration.    Clear to ascultation.    No chest wall deformity or scar.  CV- Heart regular rate and rhythm  normal tones, no murmurs   No gallops or rubs.  Ext- appear well perfused, no edema  Skin- warm and dry,   no visualized rash        ASSESSMENT / PLAN:     Essential hypertension  Well-controlled on amlodipine 10 mg, hydrochlorothiazide 25 mg and valsartan 80 mg.    BMP done in February    Arthritis of carpometacarpal (CMC) joint of left thumb  Still does not have thumb spica splint.  Pharmacy had to order it.  Encouraged her to pick it up    Healthcare maintenance  Completed POLST form with her today  Julia Guevara would be her medical agent..  Her biological son lives in [place]  Living alone, doing reasonably well  She is not concerned about memory, still doing I ADLs  No falls  Driving locally  No " "incidences             COUNSELING:  Routine preventative health including falls and exercise discussed  Estimated body mass index is 30.93 kg/m  as calculated from the following:    Height as of 3/5/21: 1.473 m (4' 10\").    Weight as of this encounter: 67.1 kg (148 lb).        She reports that she has never smoked. She has never used smokeless tobacco.      Appropriate preventive services were discussed with this patient, including applicable screening as appropriate for cardiovascular disease, diabetes, osteopenia/osteoporosis, and glaucoma.  As appropriate for age/gender, discussed screening for colorectal cancer, prostate cancer, breast cancer, and cervical cancer. Checklist reviewing preventive services available has been given to the patient.    Reviewed patients plan of care and provided an AVS. The Basic Care Plan (routine screening as documented in Health Maintenance) for Laine meets the Care Plan requirement. This Care Plan has been established and reviewed with the Patient.    Counseling Resources:  ATP IV Guidelines  Pooled Cohorts Equation Calculator  Breast Cancer Risk Calculator  Breast Cancer: Medication to Reduce Risk  FRAX Risk Assessment  ICSI Preventive Guidelines  Dietary Guidelines for Americans, 2010  USDA's MyPlate  ASA Prophylaxis  Lung CA Screening    Abdulkadir Rodriguez MD  [facility]    Identified Health Risks:  "

## 2022-05-04 NOTE — ASSESSMENT & PLAN NOTE
Well-controlled on amlodipine 10 mg, hydrochlorothiazide 25 mg and valsartan 80 mg.    BMP done in February

## 2022-05-05 ENCOUNTER — PATIENT OUTREACH (OUTPATIENT)
Dept: CARE COORDINATION | Facility: CLINIC | Age: 87
End: 2022-05-05
Payer: COMMERCIAL

## 2022-05-11 ENCOUNTER — VIRTUAL VISIT (OUTPATIENT)
Dept: BEHAVIORAL HEALTH | Facility: HOSPITAL | Age: 87
End: 2022-05-11
Attending: PSYCHIATRY & NEUROLOGY
Payer: COMMERCIAL

## 2022-05-11 DIAGNOSIS — F43.22 ADJUSTMENT DISORDER WITH ANXIOUS MOOD: Primary | ICD-10-CM

## 2022-05-11 PROCEDURE — 90834 PSYTX W PT 45 MINUTES: CPT | Mod: 95 | Performed by: SOCIAL WORKER

## 2022-05-11 ASSESSMENT — ANXIETY QUESTIONNAIRES
7. FEELING AFRAID AS IF SOMETHING AWFUL MIGHT HAPPEN: NOT AT ALL
3. WORRYING TOO MUCH ABOUT DIFFERENT THINGS: NOT AT ALL
6. BECOMING EASILY ANNOYED OR IRRITABLE: NOT AT ALL
2. NOT BEING ABLE TO STOP OR CONTROL WORRYING: NOT AT ALL
1. FEELING NERVOUS, ANXIOUS, OR ON EDGE: NOT AT ALL
GAD7 TOTAL SCORE: 0
IF YOU CHECKED OFF ANY PROBLEMS ON THIS QUESTIONNAIRE, HOW DIFFICULT HAVE THESE PROBLEMS MADE IT FOR YOU TO DO YOUR WORK, TAKE CARE OF THINGS AT HOME, OR GET ALONG WITH OTHER PEOPLE: NOT DIFFICULT AT ALL
5. BEING SO RESTLESS THAT IT IS HARD TO SIT STILL: NOT AT ALL

## 2022-05-11 ASSESSMENT — COLUMBIA-SUICIDE SEVERITY RATING SCALE - C-SSRS
1. SINCE LAST CONTACT, HAVE YOU WISHED YOU WERE DEAD OR WISHED YOU COULD GO TO SLEEP AND NOT WAKE UP?: NO
TOTAL  NUMBER OF INTERRUPTED ATTEMPTS SINCE LAST CONTACT: NO
SUICIDE, SINCE LAST CONTACT: NO
TOTAL  NUMBER OF ABORTED OR SELF INTERRUPTED ATTEMPTS SINCE LAST CONTACT: NO
6. HAVE YOU EVER DONE ANYTHING, STARTED TO DO ANYTHING, OR PREPARED TO DO ANYTHING TO END YOUR LIFE?: NO
2. HAVE YOU ACTUALLY HAD ANY THOUGHTS OF KILLING YOURSELF?: NO
ATTEMPT SINCE LAST CONTACT: NO

## 2022-05-11 ASSESSMENT — PATIENT HEALTH QUESTIONNAIRE - PHQ9: 5. POOR APPETITE OR OVEREATING: NOT AT ALL

## 2022-05-11 NOTE — PROGRESS NOTES
"    [facility] Counseling                                     Progress Note    Patient Name: Laine Sharma  Date:5/11/2022         Service Type: Individual      Session Start Time:8:05  Session End Time:8:50     Session Length: 45    Session #:32    Attendees: Client    Service Modality:  Phone Visit:      Provider verified identity through the following two step process.  Patient provided:  Patient is known previously to provider    The patient has been notified of the following:      \"We have found that certain health care needs can be provided without the need for a face to face visit.  This service lets us provide the care you need with a phone conversation.       I will have full access to your [facility] medical record during this entire phone call.   I will be taking notes for your medical record.      Since this is like an office visit, we will bill your insurance company for this service.       There are potential benefits and risks of telephone visits (e.g. limits to patient confidentiality) that differ from in-person visits.?Confidentiality still applies for telephone services, and nobody will record the visit.  It is important to be in a quiet, private space that is free of distractions (including cell phone or other devices) during the visit.??      If during the course of the call I believe a telephone visit is not appropriate, you will not be charged for this service\"     Consent has been obtained for this service by care team member: Yes     DATA  Interactive Complexity: No  Crisis: No      Progress Since Last Session (Related to Symptoms / Goals / Homework):   Symptoms: Improving : appears to be achieving her level of acceptance    Homework: Achieved / completed to satisfaction      Episode of Care Goals: Achieved / completed to satisfaction - ACTION (Actively working towards change); Intervened by reinforcing change plan / affirming steps taken-Patient is still working toward " acceptance.     Current / Ongoing Stressors and Concerns: Patient reports she has been doing pretty good since visiting brother. She is planning to return to visit him sometime in Summer. She had a time to reflect on the conversation about moving to [place]. Has changed her mind. Will stay in [place] but will visit her brother. Had a good mother's day.stays in touch with family here in [place]. Nights are still hard on her. She is still on the road to acceptance. Her thumb still bothers her. She is working with her PCP about reducing the pain. She is expecting a  to help. No other concerns today. Her next visit is in 2-3 weeks.      Treatment Objective(s) Addressed in This Session:   identify at 2 strategies to more effectively address stressors. Working toward acceptance     Intervention:   Grief processing; problem solving    Assessments completed prior to visit:10/10/2020    The following assessments were completed by patient for this visit:  PHQ2:   PHQ-2 ( 1999 [employer]) 5/11/2022 5/4/2022 4/20/2022 3/28/2022 3/8/2022 2/23/2022 1/5/2022   Q1: Little interest or pleasure in doing things 0 0 0 0 0 0 0   Q2: Feeling down, depressed or hopeless 0 0 0 0 0 0 0   PHQ-2 Score 0 0 0 0 0 0 0   PHQ-2 Total Score (12-17 Years)- Positive if 3 or more points; Administer PHQ-A if positive - - - - - - -   Q1: Little interest or pleasure in doing things - Not at all - - - - -   Q2: Feeling down, depressed or hopeless - Not at all - - - - -   PHQ-2 Score - 0 - - - - -     PHQ9:   PHQ-9 SCORE 9/15/2021 9/29/2021 10/27/2021 11/17/2021 12/1/2021 12/15/2021 2/2/2022   PHQ-9 Total Score 0 0 0 0 0 0 0     GAD7:   CHAU-7 SCORE 1/5/2022 2/2/2022 2/23/2022 3/8/2022 3/28/2022 4/20/2022 5/11/2022   Total Score 0 0 0 0 1 0 0     CAGE-AID:   CAGE-AID Total Score 7/22/2021 10/27/2021 2/2/2022 2/2/2022 3/8/2022 3/8/2022 5/11/2022   Total Score 0 0 0 0 0 0 0     PROMIS 10-Global Health (all questions and answers displayed):   PROMIS 10 1/5/2022 3/8/2022  5/11/2022   In general, would you say your health is: 3 3 3   In general, would you say your quality of life is: 3 3 3   In general, how would you rate your physical health? 3 3 3   In general, how would you rate your mental health, including your mood and your ability to think? 3 3 3   In general, how would you rate your satisfaction with your social activities and relationships? 3 3 3   In general, please rate how well you carry out your usual social activities and roles. (This includes activities at home, at work and in your community, and responsibilities as a parent, child, spouse, employee, friend, etc.) 3 3 3   To what extent are you able to carry out your everyday physical activities such as walking, climbing stairs, carrying groceries, or moving a chair? 3 3 3   In the past 7 days, how often have you been bothered by emotional problems such as feeling anxious, depressed, or irritable? 1 3 2   In the past 7 days, how would you rate your fatigue on average? 3 3 2   In the past 7 days, how would you rate your pain on average, where 0 means no pain, and 10 means worst imaginable pain? 3 5 5   Global Mental Health Score 14 12 13   Global Physical Health Score 13 12 13   PROMIS TOTAL - SUBSCORES 27 24 26   Some recent data might be hidden     [place] Suicide Severity Rating Scale (Short Version)  [place] Suicide Severity Rating (Short Version) 5/12/2021 7/22/2021 10/27/2021 2/2/2022 2/2/2022 3/8/2022 5/11/2022   Over the past 2 weeks have you felt down, depressed, or hopeless? - no no no no - -   Over the past 2 weeks have you had thoughts of killing yourself? - no no no no - -   Have you ever attempted to kill yourself? - no no no no - -   1. Wish to be Dead (Since Last Contact) 0 - - - - 0 0   2. Non-Specific Active Suicidal Thoughts (Since Last Contact) 0 - - - - 0 0   Actual Attempt (Since Last Contact) - - - - - - 0   Has subject engaged in non-suicidal self-injurious behavior? (Since Last Contact) - - - -  - 0 0   Interrupted Attempts (Since Last Contact) - - - - - 0 0   Aborted or Self-Interrupted Attempt (Since Last Contact) - - - - - 0 0   Preparatory Acts or Behavior (Since Last Contact) - - - - - 0 0   Suicide (Since Last Contact) - - - - - 0 0   Calculated C-SSRS Risk Score (Since Last Contact) No Risk Indicated - - - - No Risk Indicated No Risk Indicated      ASSESSMENT: Current Emotional / Mental Status (status of significant symptoms):   Risk status (Self / Other harm or suicidal ideation)   Patient denies current fears or concerns for personal safety.   Patient denies current or recent suicidal ideation or behaviors.   Patient denies current or recent homicidal ideation or behaviors.   Patient denies current or recent self injurious behavior or ideation.   Patient denies other safety concerns.   Patient reports there has been no change in risk factors since their last session.     Patient reports there has been no change in protective factors since their last session.     Recommended that patient call 911 or go to the local ED should there be a change in any of these risk factors.     Appearance:                            not seen, not assessed               Eye Contact:                           Good               Psychomotor Behavior:          not assessed, not seen               Attitude:                                   Cooperative               Orientation:                             Person Place Time Situation              Speech                          Rate / Production:       Normal/ Responsive                          Volume:                       Normal               Mood:                                      Not assessed, not seen              Affect:                                      Appropriate               Thought Content:                    Clear               Thought Form:                        Coherent  Logical               Insight:                                     Good       Medication Review:   No current psychiatric medications prescribed     Medication Compliance:   NA     Allergies   Allergen Reactions     Iodine Unknown      Changes in Health Issues:   None reported     Chemical Use Review:   Substance Use: Chemical use reviewed, no active concerns identified      Tobacco Use: No current tobacco use.      Diagnosis:  1. Adjustment disorder with anxious mood      Collateral Reports Completed:   Routed note to PCP    PLAN: (Patient Tasks / Therapist Tasks / Other): reviewed today 5/11/2022  Patient talk to her friends/family members  when she feels sad for support.  Patient will keep up with her walks when weather permits to distract herself and stay in shape.  Patient will continue her reading in evenings to distract her mind from her loss   Patient's next appt in 2 weeks    Rosa Stafford, LICSW  _________________________________________________________________    Individual Treatment Plan    Patient's Name: Laine Sharma  YOB: 1933    Date of Creation:10/28/2020    Date Treatment Plan Last Reviewed/Revised: 5/11/2022    DSM5 Diagnoses: Adjustment Disorders  309.0 (F43.21) With depressed mood; Grief reaction [F43.21]  Psychosocial / Contextual Factors: Grief: lost  after over 40 years of marriage. Limited mobility to get doing.     PROMIS (reviewed every 90 days): 26- completed on 5/11/2022    Referral / Collaboration:  Referral to another professional/service is not indicated at this time..    Anticipated number of session for this episode of care: 12  Anticipation frequency of session: Biweekly  Anticipated Duration of each session: 38-52 minutes  Treatment plan will be reviewed in 90 days or when goals have been changed.     MeasurableTreatment Goal(s) related to diagnosis / functional impairment(s)  Goal 1: Patient will talk to her friend or sister daily for suppot    I will know I've met my goal when I don't cry every time I am alone.      Objective  #A (Patient Action)    Patient will increase understanding of steps in the grief process.  Status: Continued - Date(s): 5/11/2022    Intervention(s)  Therapist will assign homework : go to [employer] with neighbor friend.    Objective #B  Patient will increase understanding of steps in the grief process.  Status: Continued - Date(s): 5/11/2022    Intervention(s)  Therapist will teach emotional regulation skills. asisting patient to create, collect memories about .    Objective #C  Patient will identify 3 strategies to more effectively address stressors.  Status: Continued - Date(s): 5/11/2022    Intervention(s)  Therapist will Continue to assist patient with looking for open grief suppot near her home.     Patient has reviewed and agreed to the above plan.    JOAQUIM Newell  May 11th 2022

## 2022-05-12 ASSESSMENT — ANXIETY QUESTIONNAIRES: GAD7 TOTAL SCORE: 0

## 2022-05-26 ENCOUNTER — VIRTUAL VISIT (OUTPATIENT)
Dept: BEHAVIORAL HEALTH | Facility: HOSPITAL | Age: 87
End: 2022-05-26
Attending: PSYCHIATRY & NEUROLOGY
Payer: COMMERCIAL

## 2022-05-26 DIAGNOSIS — F43.22 ADJUSTMENT DISORDER WITH ANXIOUS MOOD: Primary | ICD-10-CM

## 2022-05-26 PROCEDURE — 90834 PSYTX W PT 45 MINUTES: CPT | Mod: 95 | Performed by: SOCIAL WORKER

## 2022-05-26 NOTE — PROGRESS NOTES
"    [facility] Counseling                                     Progress Note    Patient Name: Laine Sharma  Date:5/26/2022         Service Type: Individual      Session Start Time:11:03  Session End Time:11:48     Session Length: 45    Session #:33    Attendees: Client    Service Modality:  Phone Visit:      Provider verified identity through the following two step process.  Patient provided:  Patient is known previously to provider    The patient has been notified of the following:      \"We have found that certain health care needs can be provided without the need for a face to face visit.  This service lets us provide the care you need with a phone conversation.       I will have full access to your [facility] medical record during this entire phone call.   I will be taking notes for your medical record.      Since this is like an office visit, we will bill your insurance company for this service.       There are potential benefits and risks of telephone visits (e.g. limits to patient confidentiality) that differ from in-person visits.?Confidentiality still applies for telephone services, and nobody will record the visit.  It is important to be in a quiet, private space that is free of distractions (including cell phone or other devices) during the visit.??      If during the course of the call I believe a telephone visit is not appropriate, you will not be charged for this service\"     Consent has been obtained for this service by care team member: Yes     DATA  Interactive Complexity: No  Crisis: No      Progress Since Last Session (Related to Symptoms / Goals / Homework):   Symptoms: Improving : appears to be achieving her level of acceptance    Homework: Achieved / completed to satisfaction      Episode of Care Goals: Achieved / completed to satisfaction - ACTION (Actively working towards change); Intervened by reinforcing change plan / affirming steps taken-Patient is still working toward " acceptance.     Current / Ongoing Stressors and Concerns: Patient is doing pretty good. She is finding projects to do around the house to keep busy. Has been reading daily especially in the evenings.like books about murders and finds them interesting. Has been staying away from love books. Her Jainism  still visits her regularly. Sometimes his wife does. The thumb is still bothering her she has an appt with her PCP to look at it again.[place] too [place] there. Will go there later in the month. With writer taking some time off in June, patient's care coordinator will be informed so she get get some call. Her next visit is in July.      Treatment Objective(s) Addressed in This Session:   identify at 2 strategies to more effectively address stressors. Working toward acceptance     Intervention:   Grief processing; problem solving    Assessments completed prior to visit:10/10/2020    The following assessments were completed by patient for this visit:  PHQ2:   PHQ-2 ( 1999 [employer]) 5/11/2022 5/4/2022 4/20/2022 3/28/2022 3/8/2022 2/23/2022 1/5/2022   Q1: Little interest or pleasure in doing things 0 0 0 0 0 0 0   Q2: Feeling down, depressed or hopeless 0 0 0 0 0 0 0   PHQ-2 Score 0 0 0 0 0 0 0   PHQ-2 Total Score (12-17 Years)- Positive if 3 or more points; Administer PHQ-A if positive - - - - - - -   Q1: Little interest or pleasure in doing things - Not at all - - - - -   Q2: Feeling down, depressed or hopeless - Not at all - - - - -   PHQ-2 Score - 0 - - - - -     PHQ9:   PHQ-9 SCORE 9/15/2021 9/29/2021 10/27/2021 11/17/2021 12/1/2021 12/15/2021 2/2/2022   PHQ-9 Total Score 0 0 0 0 0 0 0     GAD7:   CHAU-7 SCORE 1/5/2022 2/2/2022 2/23/2022 3/8/2022 3/28/2022 4/20/2022 5/11/2022   Total Score 0 0 0 0 1 0 0     CAGE-AID:   CAGE-AID Total Score 7/22/2021 10/27/2021 2/2/2022 2/2/2022 3/8/2022 3/8/2022 5/11/2022   Total Score 0 0 0 0 0 0 0     PROMIS 10-Global Health (all  questions and answers displayed):   PROMIS 10 1/5/2022 3/8/2022 5/11/2022   In general, would you say your health is: 3 3 3   In general, would you say your quality of life is: 3 3 3   In general, how would you rate your physical health? 3 3 3   In general, how would you rate your mental health, including your mood and your ability to think? 3 3 3   In general, how would you rate your satisfaction with your social activities and relationships? 3 3 3   In general, please rate how well you carry out your usual social activities and roles. (This includes activities at home, at work and in your community, and responsibilities as a parent, child, spouse, employee, friend, etc.) 3 3 3   To what extent are you able to carry out your everyday physical activities such as walking, climbing stairs, carrying groceries, or moving a chair? 3 3 3   In the past 7 days, how often have you been bothered by emotional problems such as feeling anxious, depressed, or irritable? 1 3 2   In the past 7 days, how would you rate your fatigue on average? 3 3 2   In the past 7 days, how would you rate your pain on average, where 0 means no pain, and 10 means worst imaginable pain? 3 5 5   Global Mental Health Score 14 12 13   Global Physical Health Score 13 12 13   PROMIS TOTAL - SUBSCORES 27 24 26   Some recent data might be hidden     [place] Suicide Severity Rating Scale (Short Version)  [place] Suicide Severity Rating (Short Version) 5/12/2021 7/22/2021 10/27/2021 2/2/2022 2/2/2022 3/8/2022 5/11/2022   Over the past 2 weeks have you felt down, depressed, or hopeless? - no no no no - -   Over the past 2 weeks have you had thoughts of killing yourself? - no no no no - -   Have you ever attempted to kill yourself? - no no no no - -   1. Wish to be Dead (Since Last Contact) 0 - - - - 0 0   2. Non-Specific Active Suicidal Thoughts (Since Last Contact) 0 - - - - 0 0   Actual Attempt (Since Last Contact) - - - - - - 0   Has subject engaged in  non-suicidal self-injurious behavior? (Since Last Contact) - - - - - 0 0   Interrupted Attempts (Since Last Contact) - - - - - 0 0   Aborted or Self-Interrupted Attempt (Since Last Contact) - - - - - 0 0   Preparatory Acts or Behavior (Since Last Contact) - - - - - 0 0   Suicide (Since Last Contact) - - - - - 0 0   Calculated C-SSRS Risk Score (Since Last Contact) No Risk Indicated - - - - No Risk Indicated No Risk Indicated      ASSESSMENT: Current Emotional / Mental Status (status of significant symptoms):   Risk status (Self / Other harm or suicidal ideation)   Patient denies current fears or concerns for personal safety.   Patient denies current or recent suicidal ideation or behaviors.   Patient denies current or recent homicidal ideation or behaviors.   Patient denies current or recent self injurious behavior or ideation.   Patient denies other safety concerns.   Patient reports there has been no change in risk factors since their last session.     Patient reports there has been no change in protective factors since their last session.     Recommended that patient call 911 or go to the local ED should there be a change in any of these risk factors.     Appearance:                            not seen, not assessed               Eye Contact:                           Good               Psychomotor Behavior:          not assessed, not seen               Attitude:                                   Cooperative               Orientation:                             Person Place Time Situation              Speech                          Rate / Production:       Normal/ Responsive                          Volume:                       Normal               Mood:                                      Not assessed, not seen              Affect:                                      Appropriate               Thought Content:                    Clear               Thought Form:                        Coherent  Logical                Insight:                                     Good      Medication Review:   No current psychiatric medications prescribed     Medication Compliance:   NA     Allergies   Allergen Reactions     Iodine Unknown      Changes in Health Issues:   None reported     Chemical Use Review:   Substance Use: Chemical use reviewed, no active concerns identified      Tobacco Use: No current tobacco use.      Diagnosis:  1. Adjustment disorder with anxious mood      Collateral Reports Completed:   Routed note to PCP    PLAN: (Patient Tasks / Therapist Tasks / Other): Reviewed today 5/26/2022  Patient talk to her friends/family members  when she feels sad for support.  Patient will keep up with her walks with her cat when weather permits to distract herself and stay in shape.  Patient will spend some time with family on memorial day  Patient will continue her reading in evenings to distract her mind from her loss   Patient's next appt in 2 weeks    JOAQUIM Newell  _________________________________________________________________    Individual Treatment Plan    Patient's Name: Laine Sharma  YOB: 1933    Date of Creation:10/28/2020    Date Treatment Plan Last Reviewed/Revised: 5/11/2022    DSM5 Diagnoses: Adjustment Disorders  309.0 (F43.21) With depressed mood; Grief reaction [F43.21]    Psychosocial / Contextual Factors: Grief: lost  after over 40 years of marriage. Limited mobility to get doing.     PROMIS (reviewed every 90 days): 26- completed on 5/11/2022    Referral / Collaboration:  Referral to another professional/service is not indicated at this time..    Anticipated number of session for this episode of care: 12  Anticipation frequency of session: Biweekly  Anticipated Duration of each session: 38-52 minutes  Treatment plan will be reviewed in 90 days or when goals have been changed.     MeasurableTreatment Goal(s) related to diagnosis / functional impairment(s)  Goal 1: Patient will  talk to her friend or sister daily for suppot    I will know I've met my goal when I don't cry every time I am alone.      Objective #A (Patient Action)    Patient will increase understanding of steps in the grief process.  Status: Continued - Date(s): 5/11/2022    Intervention(s)  Therapist will assign homework : go to [place] with neighbor friend.    Objective #B  Patient will increase understanding of steps in the grief process.  Status: Continued - Date(s): 5/11/2022    Intervention(s)  Therapist will teach emotional regulation skills. asisting patient to create, collect memories about .    Objective #C  Patient will identify 3 strategies to more effectively address stressors.  Status: Continued - Date(s): 5/11/2022    Intervention(s)  Therapist will Continue to assist patient with looking for open grief suppot near her home.     Patient has reviewed and agreed to the above plan.    Rosa Stafford, JOAQUIM  May 11th 2022

## 2022-06-02 ENCOUNTER — PATIENT OUTREACH (OUTPATIENT)
Dept: CARE COORDINATION | Facility: CLINIC | Age: 87
End: 2022-06-02
Payer: COMMERCIAL

## 2022-06-02 NOTE — PROGRESS NOTES
6/2/2022  Clinic Care Coordination Contact  [facility]/Voicemail    Clinical Data: Care Coordinator Outreach  Outreach attempted x 1.  Left message on patient's voicemail with call back information and requested return call.  Plan: Care Coordinator  will try to reach patient again in 10 business days.    CHW follow up:6-16-22    Brian Bojorquez  Community Health Worker  [facility]  Clinic Care Coordination  [email or web address]   Office: [phone]  Fax: [phone]

## 2022-06-15 ENCOUNTER — PATIENT OUTREACH (OUTPATIENT)
Dept: CARE COORDINATION | Facility: CLINIC | Age: 87
End: 2022-06-15
Payer: COMMERCIAL

## 2022-06-15 ASSESSMENT — ACTIVITIES OF DAILY LIVING (ADL): DEPENDENT_IADLS:: INDEPENDENT

## 2022-06-15 NOTE — PROGRESS NOTES
Care Coordination Clinician Chart Review     Situation: Patient chart reviewed by SW/care coordinator.?     Background: Initial reassessment was completed by SW/CC on 9-8-21 for continued enrollment to Care Coordination.?? Patient centered goals were developed with participation from patient.? Lead CC handed patient off to CHW for continued outreach every 30 days.??     Assessment: Per chart review, patient outreach completed by CC CHW on 6-2-22.  CHW was unable to connect with pt on that day.  Patient was working toward of completing her annual wellness visit, which pt did on 5-4-22.  As a result, pt will be graduated from Care Coordination.     Goals    None     ??     Plan/Recommendations:  Pt graduated from Care Coordination due to achieving her goal of completing her Annual Wellness Visit.  Care Coordination remains available to pt for future needs.     Plan of Care updated and sent to patient: Tawana Paz, MIKEY/SALOMÓN  [facility]

## 2022-06-21 ENCOUNTER — OFFICE VISIT (OUTPATIENT)
Dept: FAMILY MEDICINE | Facility: CLINIC | Age: 87
End: 2022-06-21
Payer: COMMERCIAL

## 2022-06-21 VITALS
RESPIRATION RATE: 16 BRPM | SYSTOLIC BLOOD PRESSURE: 130 MMHG | BODY MASS INDEX: 31.56 KG/M2 | HEART RATE: 76 BPM | WEIGHT: 151 LBS | DIASTOLIC BLOOD PRESSURE: 60 MMHG

## 2022-06-21 DIAGNOSIS — F43.22 ADJUSTMENT DISORDER WITH ANXIOUS MOOD: Primary | ICD-10-CM

## 2022-06-21 DIAGNOSIS — I10 ESSENTIAL HYPERTENSION: ICD-10-CM

## 2022-06-21 DIAGNOSIS — M18.12 ARTHRITIS OF CARPOMETACARPAL (CMC) JOINT OF LEFT THUMB: ICD-10-CM

## 2022-06-21 PROBLEM — M75.81 ROTATOR CUFF TENDONITIS, RIGHT: Status: RESOLVED | Noted: 2020-11-25 | Resolved: 2022-06-21

## 2022-06-21 PROBLEM — M70.50 PES ANSERINE BURSITIS: Status: RESOLVED | Noted: 2018-10-09 | Resolved: 2022-06-21

## 2022-06-21 PROBLEM — F43.21 ADJUSTMENT DISORDER WITH DEPRESSED MOOD: Status: RESOLVED | Noted: 2020-09-25 | Resolved: 2022-06-21

## 2022-06-21 PROBLEM — Z78.0 ASYMPTOMATIC MENOPAUSAL STATE: Status: RESOLVED | Noted: 2020-09-03 | Resolved: 2022-06-21

## 2022-06-21 PROCEDURE — 99212 OFFICE O/P EST SF 10 MIN: CPT | Mod: 25 | Performed by: FAMILY MEDICINE

## 2022-06-21 PROCEDURE — 29125 APPL SHORT ARM SPLINT STATIC: CPT | Performed by: FAMILY MEDICINE

## 2022-06-21 NOTE — ASSESSMENT & PLAN NOTE
Doing well  Indicates plans to visit [place] this October  Her  who passed away 2 years ago told her to go to [place] to live if he should die  She plans to visit, not moving

## 2022-06-21 NOTE — ASSESSMENT & PLAN NOTE
At least 3 visits dedicated to trying to get her Velcro thumb spica splint, still has wrist splint without thumb component  Radial gutter splint to IP joint constructed today with 3 inch fiberglass roll and 3 inch padding +2 2 inch elastic bandages.  Recommend that she wear this for comfort.

## 2022-06-21 NOTE — PROGRESS NOTES
Assessment/ Plan  Essential hypertension  Well-controlled today    Adjustment disorder with anxious mood  Doing well  Indicates plans to visit [place] this October  Her  who passed away 2 years ago told her to go to [place] to live if he should die  She plans to visit, not moving    Arthritis of carpometacarpal (CMC) joint of left thumb  At least 3 visits dedicated to trying to get her Velcro thumb spica splint, still has wrist splint without thumb component  Radial gutter splint to IP joint constructed today with 3 inch fiberglass roll and 3 inch padding +2 2 inch elastic bandages.  Recommend that she wear this for comfort.     Subjective  CC:  chief complaint  HPI:  Patient continues to have pain in her thumb.  Still does not have thumb spica splint as previously ordered.  Otherwise doing well.  No complaint  PFSH:  Patient Active Problem List   Diagnosis     Esophageal reflux     Osteoarthritis     Hyperlipidemia     Essential hypertension     Healthcare maintenance     Nodule of finger of left hand     Grief reaction     Arthritis of carpometacarpal (CMC) joint of left thumb     Anemia, unspecified type     Adjustment disorder with anxious mood     acetaminophen (TYLENOL) 325 MG tablet, [ACETAMINOPHEN (TYLENOL) 325 MG TABLET] Take 650 mg by mouth every 6 (six) hours as needed for pain.  amLODIPine (NORVASC) 10 MG tablet, [AMLODIPINE (NORVASC) 10 MG TABLET] Take 1 tablet by mouth once daily  escitalopram (LEXAPRO) 5 MG tablet, Take 1 tablet (5 mg) by mouth daily  hydrochlorothiazide (HYDRODIURIL) 25 MG tablet, Take 1 tablet (25 mg) by mouth daily  valsartan (DIOVAN) 80 MG tablet, Take 1 tablet (80 mg) by mouth daily    No current facility-administered medications on file prior to visit.       History   Smoking Status     Never Smoker   Smokeless Tobacco     Never Used     Social History     Social History Narrative        Lives in a townhouse alone    2022-still driving locally-all IADLs independent         Julia Guevara lives in [facility] and would be medical decision-maker at this point.  Her son lives in [place]     Patient Care Team:  Abdulkadir Rodriguez MD as PCP - General  Abdulkadir Rodriguez MD as Assigned PCP  ROS  As above      Objective  Physical Exam  Vitals:    06/21/22 1021   BP: 130/60   BP Location: Right arm   Patient Position: Sitting   Cuff Size: Adult Regular   Pulse: 76   Resp: 16   Weight: 68.5 kg (151 lb)     Body mass index is 31.56 kg/m .  MP joint hypertrophy with discomfort.  Left thumb.  Thumb spica splint constructed as above  Diagnostics:   None      Please note: Voice recognition software was used in this dictation.  It may therefore contain typographical errors.

## 2022-07-08 ENCOUNTER — VIRTUAL VISIT (OUTPATIENT)
Dept: PSYCHOLOGY | Facility: CLINIC | Age: 87
End: 2022-07-08
Payer: COMMERCIAL

## 2022-07-08 DIAGNOSIS — F43.22 ADJUSTMENT DISORDER WITH ANXIOUS MOOD: Primary | ICD-10-CM

## 2022-07-08 PROCEDURE — 90834 PSYTX W PT 45 MINUTES: CPT | Mod: 95 | Performed by: SOCIAL WORKER

## 2022-07-08 ASSESSMENT — COLUMBIA-SUICIDE SEVERITY RATING SCALE - C-SSRS
ATTEMPT SINCE LAST CONTACT: NO
TOTAL  NUMBER OF INTERRUPTED ATTEMPTS SINCE LAST CONTACT: NO
SUICIDE, SINCE LAST CONTACT: NO
2. HAVE YOU ACTUALLY HAD ANY THOUGHTS OF KILLING YOURSELF?: NO
1. SINCE LAST CONTACT, HAVE YOU WISHED YOU WERE DEAD OR WISHED YOU COULD GO TO SLEEP AND NOT WAKE UP?: NO
6. HAVE YOU EVER DONE ANYTHING, STARTED TO DO ANYTHING, OR PREPARED TO DO ANYTHING TO END YOUR LIFE?: NO
TOTAL  NUMBER OF ABORTED OR SELF INTERRUPTED ATTEMPTS SINCE LAST CONTACT: NO

## 2022-07-08 ASSESSMENT — PATIENT HEALTH QUESTIONNAIRE - PHQ9: 5. POOR APPETITE OR OVEREATING: NOT AT ALL

## 2022-07-08 ASSESSMENT — ANXIETY QUESTIONNAIRES
6. BECOMING EASILY ANNOYED OR IRRITABLE: NOT AT ALL
3. WORRYING TOO MUCH ABOUT DIFFERENT THINGS: NOT AT ALL
5. BEING SO RESTLESS THAT IT IS HARD TO SIT STILL: NOT AT ALL
7. FEELING AFRAID AS IF SOMETHING AWFUL MIGHT HAPPEN: NOT AT ALL
GAD7 TOTAL SCORE: 0
2. NOT BEING ABLE TO STOP OR CONTROL WORRYING: NOT AT ALL
GAD7 TOTAL SCORE: 0
IF YOU CHECKED OFF ANY PROBLEMS ON THIS QUESTIONNAIRE, HOW DIFFICULT HAVE THESE PROBLEMS MADE IT FOR YOU TO DO YOUR WORK, TAKE CARE OF THINGS AT HOME, OR GET ALONG WITH OTHER PEOPLE: NOT DIFFICULT AT ALL
1. FEELING NERVOUS, ANXIOUS, OR ON EDGE: NOT AT ALL

## 2022-07-08 NOTE — PROGRESS NOTES
"    [facility]                                     Progress Note    Patient Name: Laine Sharma  Date:7/08/2022         Service Type: Individual      Session Start Time:9:03  Session End Time:9:50     Session Length: 47    Session #:34    Attendees: Client    Service Modality:  Phone Visit:      Provider verified identity through the following two step process.  Patient provided:  Patient is known previously to provider    The patient has been notified of the following:      \"We have found that certain health care needs can be provided without the need for a face to face visit.  This service lets us provide the care you need with a phone conversation.       I will have full access to your [facility] medical record during this entire phone call.   I will be taking notes for your medical record.      Since this is like an office visit, we will bill your insurance company for this service.       There are potential benefits and risks of telephone visits (e.g. limits to patient confidentiality) that differ from in-person visits.?Confidentiality still applies for telephone services, and nobody will record the visit.  It is important to be in a quiet, private space that is free of distractions (including cell phone or other devices) during the visit.??      If during the course of the call I believe a telephone visit is not appropriate, you will not be charged for this service\"     Consent has been obtained for this service by care team member: Yes     DATA  Interactive Complexity: No  Crisis: No      Progress Since Last Session (Related to Symptoms / Goals / Homework):   Symptoms: Improving : appears to be achieving her level of acceptance    Homework: Achieved / completed to satisfaction      Episode of Care Goals: Achieved / completed to satisfaction - ACTION (Actively working towards change); Intervened by reinforcing change plan / affirming steps taken-Patient is still working toward " acceptance.     Current / Ongoing Stressors and Concerns: Patient continues to have pain on her thumb. This is the most crucial issue she encounters now. She is working with her PCP about the long term solution. Also continues to experience sadness from her loss 2 years ago. She notes evenings are still harder. Though continues to find a way to cope. Reads mystery books, call her sister in law, watches the Twins and play with her cat. Also has been active going out with her family. Just went to eat with her family to celebrate her 89 th birthday. Has been organizing her home and remember to give ride clothes she does not need. Has been [facility] them to [facility] [employer]. Her  and wife still visit her. She appreciates this. She is taking precaution to avoid covid. Likes her therapy visit and will get the next in a month. No other concerns today.      Treatment Objective(s) Addressed in This Session:   identify at 2 strategies to more effectively address stressors. Making progress toward acceptance     Intervention:   Grief processing; problem solving    Assessments completed prior to visit:10/10/2020    The following assessments were completed by patient for this visit:  PHQ2:   PHQ-2 ( 1999 [employer]) 7/8/2022 5/11/2022 5/4/2022 4/20/2022 3/28/2022 3/8/2022 2/23/2022   Q1: Little interest or pleasure in doing things 0 0 0 0 0 0 0   Q2: Feeling down, depressed or hopeless 0 0 0 0 0 0 0   PHQ-2 Score 0 0 0 0 0 0 0   PHQ-2 Total Score (12-17 Years)- Positive if 3 or more points; Administer PHQ-A if positive - - - - - - -   Q1: Little interest or pleasure in doing things - - Not at all - - - -   Q2: Feeling down, depressed or hopeless - - Not at all - - - -   PHQ-2 Score - - 0 - - - -     PHQ9:   PHQ-9 SCORE 9/15/2021 9/29/2021 10/27/2021 11/17/2021 12/1/2021 12/15/2021 2/2/2022   PHQ-9 Total Score 0 0 0 0 0 0 0     GAD7:   CHAU-7 SCORE 2/2/2022 2/23/2022 3/8/2022 3/28/2022 4/20/2022 5/11/2022 7/8/2022   Total Score 0 0 0 1  0 0 0     CAGE-AID:   CAGE-AID Total Score 10/27/2021 2/2/2022 2/2/2022 3/8/2022 3/8/2022 5/11/2022 7/8/2022   Total Score 0 0 0 0 0 0 0     PROMIS 10-Global Health (all questions and answers displayed):   PROMIS 10 1/5/2022 3/8/2022 5/11/2022 7/8/2022   In general, would you say your health is: 3 3 3 3   In general, would you say your quality of life is: 3 3 3 3   In general, how would you rate your physical health? 3 3 3 3   In general, how would you rate your mental health, including your mood and your ability to think? 3 3 3 3   In general, how would you rate your satisfaction with your social activities and relationships? 3 3 3 3   In general, please rate how well you carry out your usual social activities and roles. (This includes activities at home, at work and in your community, and responsibilities as a parent, child, spouse, employee, friend, etc.) 3 3 3 3   To what extent are you able to carry out your everyday physical activities such as walking, climbing stairs, carrying groceries, or moving a chair? 3 3 3 3   In the past 7 days, how often have you been bothered by emotional problems such as feeling anxious, depressed, or irritable? 1 3 2 3   In the past 7 days, how would you rate your fatigue on average? 3 3 2 2   In the past 7 days, how would you rate your pain on average, where 0 means no pain, and 10 means worst imaginable pain? 3 5 5 4   Global Mental Health Score 14 12 13 12   Global Physical Health Score 13 12 13 13   PROMIS TOTAL - SUBSCORES 27 24 26 25   Some recent data might be hidden     Blaine Suicide Severity Rating Scale (Short Version)  Blaine Suicide Severity Rating (Short Version) 7/22/2021 10/27/2021 2/2/2022 2/2/2022 3/8/2022 5/11/2022 7/8/2022   Over the past 2 weeks have you felt down, depressed, or hopeless? no no no no - - -   Over the past 2 weeks have you had thoughts of killing yourself? no no no no - - -   Have you ever attempted to kill yourself? no no no no - - -   1.  Wish to be Dead (Since Last Contact) - - - - 0 0 0   2. Non-Specific Active Suicidal Thoughts (Since Last Contact) - - - - 0 0 0   Actual Attempt (Since Last Contact) - - - - - 0 0   Has subject engaged in non-suicidal self-injurious behavior? (Since Last Contact) - - - - 0 0 0   Interrupted Attempts (Since Last Contact) - - - - 0 0 0   Aborted or Self-Interrupted Attempt (Since Last Contact) - - - - 0 0 0   Preparatory Acts or Behavior (Since Last Contact) - - - - 0 0 0   Suicide (Since Last Contact) - - - - 0 0 0   Calculated C-SSRS Risk Score (Since Last Contact) - - - - No Risk Indicated No Risk Indicated No Risk Indicated      ASSESSMENT: Current Emotional / Mental Status (status of significant symptoms):   Risk status (Self / Other harm or suicidal ideation)   Patient denies current fears or concerns for personal safety.   Patient denies current or recent suicidal ideation or behaviors.   Patient denies current or recent homicidal ideation or behaviors.   Patient denies current or recent self injurious behavior or ideation.   Patient denies other safety concerns.   Patient reports there has been no change in risk factors since their last session.     Patient reports there has been no change in protective factors since their last session.     Recommended that patient call 911 or go to the local ED should there be a change in any of these risk factors.     Appearance:                            not seen, not assessed               Eye Contact:                           Good               Psychomotor Behavior:          not assessed, not seen               Attitude:                                   Cooperative               Orientation:                             Person Place Time Situation              Speech                          Rate / Production:       Normal/ Responsive                          Volume:                       Normal               Mood:                                      Not assessed, not  seen              Affect:                                      Appropriate               Thought Content:                    Clear               Thought Form:                        Coherent  Logical               Insight:                                     Good      Medication Review:   No current psychiatric medications prescribed     Medication Compliance:   NA     Allergies   Allergen Reactions     Iodine Unknown      Changes in Health Issues:   None reported     Chemical Use Review:   Substance Use: Chemical use reviewed, no active concerns identified      Tobacco Use: No current tobacco use.      Diagnosis:  1. Adjustment disorder with anxious mood      Collateral Reports Completed:   Routed note to PCP    PLAN: (Patient Tasks / Therapist Tasks / Other): Reviewed today 7/08/2022  Patient talk to her friends/family members  when she feels sad for support.  Patient will keep up with her walks with her cat when weather permits to distract herself and stay in shape.  Patient will continue her reading and watch her favorite suppot in evenings to distract her mind from her loss   Patient's next appt in 2 weeks    Rosa Stafford, LICSW  _________________________________________________________________    Individual Treatment Plan    Patient's Name: Laine Sharma  YOB: 1933    Date of Creation:10/28/2020    Date Treatment Plan Last Reviewed/Revised: 5/11/2022    DSM5 Diagnoses: Adjustment Disorders  309.0 (F43.21) With depressed mood; Grief reaction [F43.21]    Psychosocial / Contextual Factors: Grief: lost  after over 40 years of marriage. Limited mobility to get doing.     PROMIS (reviewed every 90 days): 26- completed on 5/11/2022    Referral / Collaboration:  Referral to another professional/service is not indicated at this time..    Anticipated number of session for this episode of care: 12  Anticipation frequency of session: Biweekly  Anticipated Duration of each session: 38-52  minutes  Treatment plan will be reviewed in 90 days or when goals have been changed.     MeasurableTreatment Goal(s) related to diagnosis / functional impairment(s)  Goal 1: Patient will talk to her friend or sister daily for suppot    I will know I've met my goal when I don't cry every time I am alone.      Objective #A (Patient Action)    Patient will increase understanding of steps in the grief process.  Status: Continued - Date(s): 5/11/2022    Intervention(s)  Therapist will assign homework : go to [employer] with neighbor friend.    Objective #B  Patient will increase understanding of steps in the grief process.  Status: Continued - Date(s): 5/11/2022    Intervention(s)  Therapist will teach emotional regulation skills. asisting patient to create, collect memories about .    Objective #C  Patient will identify 3 strategies to more effectively address stressors.  Status: Continued - Date(s): 5/11/2022    Intervention(s)  Therapist will Continue to assist patient with looking for open grief suppot near her home.     Patient has reviewed and agreed to the above plan .    JOAQUIM Newell  May 11th 2022

## 2022-08-04 ENCOUNTER — OFFICE VISIT (OUTPATIENT)
Dept: FAMILY MEDICINE | Facility: CLINIC | Age: 87
End: 2022-08-04
Payer: COMMERCIAL

## 2022-08-04 VITALS
HEIGHT: 57 IN | BODY MASS INDEX: 32.15 KG/M2 | SYSTOLIC BLOOD PRESSURE: 133 MMHG | HEART RATE: 78 BPM | RESPIRATION RATE: 18 BRPM | TEMPERATURE: 97.8 F | DIASTOLIC BLOOD PRESSURE: 73 MMHG | WEIGHT: 149 LBS

## 2022-08-04 DIAGNOSIS — M18.12 ARTHRITIS OF CARPOMETACARPAL (CMC) JOINT OF LEFT THUMB: Primary | ICD-10-CM

## 2022-08-04 DIAGNOSIS — I10 ESSENTIAL HYPERTENSION: ICD-10-CM

## 2022-08-04 PROCEDURE — 99213 OFFICE O/P EST LOW 20 MIN: CPT | Performed by: FAMILY MEDICINE

## 2022-08-04 NOTE — ASSESSMENT & PLAN NOTE
Back again for same problem.  Last visit, I made radial gutter splint in order to immobilize since it seemed that she had not been able to  Velcro thumb spica splint.  She reports that she has thumb spica splint and that radial gutter splint was too much to wear on a day-to-day basis.  Encouraged her to wear a Velcro thumb spica splint during the day when she is working.  Discussed that that is really the only reasonable treatment that we have for this problem.

## 2022-08-04 NOTE — PROGRESS NOTES
Assessment/ Plan  Essential hypertension  Well-controlled on amlodipine 10, hydrochlorothiazide 25, valsartan 80 mg daily.  No changes.    Arthritis of carpometacarpal (CMC) joint of left thumb  Back again for same problem.  Last visit, I made radial gutter splint in order to immobilize since it seemed that she had not been able to  Velcro thumb spica splint.  She reports that she has thumb spica splint and that radial gutter splint was too much to wear on a day-to-day basis.  Encouraged her to wear a Velcro thumb spica splint during the day when she is working.  Discussed that that is really the only reasonable treatment that we have for this problem.     Subjective  CC:  chief complaint  HPI:  Ongoing thumb pain  Splinted and made was a little too much  Still wondering what to do.  No other questions  PFSH:  Patient Active Problem List   Diagnosis     Esophageal reflux     Osteoarthritis     Hyperlipidemia     Essential hypertension     Healthcare maintenance     Nodule of finger of left hand     Grief reaction     Arthritis of carpometacarpal (CMC) joint of left thumb     Anemia, unspecified type     Adjustment disorder with anxious mood     acetaminophen (TYLENOL) 325 MG tablet, [ACETAMINOPHEN (TYLENOL) 325 MG TABLET] Take 650 mg by mouth every 6 (six) hours as needed for pain.  amLODIPine (NORVASC) 10 MG tablet, [AMLODIPINE (NORVASC) 10 MG TABLET] Take 1 tablet by mouth once daily  escitalopram (LEXAPRO) 5 MG tablet, Take 1 tablet (5 mg) by mouth daily  hydrochlorothiazide (HYDRODIURIL) 25 MG tablet, Take 1 tablet (25 mg) by mouth daily  valsartan (DIOVAN) 80 MG tablet, Take 1 tablet (80 mg) by mouth daily    No current facility-administered medications on file prior to visit.       History   Smoking Status     Never Smoker   Smokeless Tobacco     Never Used     Social History     Social History Narrative        Lives in a townhouse alone    2022-still driving locally-all IADLs independent         "Julia Guevara lives in town and would be medical decision-maker at this point.  Her son lives in [place]     Patient Care Team:  Abdulkadir Rodriguez MD as PCP - General  Abdulkadir Rodriguez MD as Assigned PCP  ROS  As above      Objective  Physical Exam  Vitals:    08/04/22 1036   BP: 133/73   BP Location: Right arm   Patient Position: Sitting   Cuff Size: Adult Regular   Pulse: 78   Resp: 18   Temp: 97.8  F (36.6  C)   TempSrc: Temporal   Weight: 67.6 kg (149 lb)   Height: 1.448 m (4' 9\")     Body mass index is 32.24 kg/m .  Tenderness on base of left thumb.  Hypertrophic changes  Diagnostics:   None      Please note: Voice recognition software was used in this dictation.  It may therefore contain typographical errors.          .  ..  Answers for HPI/ROS submitted by the patient on 8/4/2022  What is the reason for your visit today? : Left thumb pain  How many servings of fruits and vegetables do you eat daily?: 2-3  On average, how many sweetened beverages do you drink each day (Examples: soda, juice, sweet tea, etc.  Do NOT count diet or artificially sweetened beverages)?: 1  How many minutes a day do you exercise enough to make your heart beat faster?: 20 to 29  How many days a week do you exercise enough to make your heart beat faster?: 7  How many days per week do you miss taking your medication?: 0      "

## 2022-08-17 ENCOUNTER — VIRTUAL VISIT (OUTPATIENT)
Dept: PSYCHOLOGY | Facility: CLINIC | Age: 87
End: 2022-08-17
Payer: COMMERCIAL

## 2022-08-17 DIAGNOSIS — F43.21 ADJUSTMENT DISORDER WITH DEPRESSED MOOD: Primary | ICD-10-CM

## 2022-08-17 PROCEDURE — 90834 PSYTX W PT 45 MINUTES: CPT | Mod: 95 | Performed by: SOCIAL WORKER

## 2022-08-17 ASSESSMENT — ANXIETY QUESTIONNAIRES
3. WORRYING TOO MUCH ABOUT DIFFERENT THINGS: NOT AT ALL
GAD7 TOTAL SCORE: 0
IF YOU CHECKED OFF ANY PROBLEMS ON THIS QUESTIONNAIRE, HOW DIFFICULT HAVE THESE PROBLEMS MADE IT FOR YOU TO DO YOUR WORK, TAKE CARE OF THINGS AT HOME, OR GET ALONG WITH OTHER PEOPLE: NOT DIFFICULT AT ALL
7. FEELING AFRAID AS IF SOMETHING AWFUL MIGHT HAPPEN: NOT AT ALL
2. NOT BEING ABLE TO STOP OR CONTROL WORRYING: NOT AT ALL
1. FEELING NERVOUS, ANXIOUS, OR ON EDGE: NOT AT ALL
GAD7 TOTAL SCORE: 0
6. BECOMING EASILY ANNOYED OR IRRITABLE: NOT AT ALL
5. BEING SO RESTLESS THAT IT IS HARD TO SIT STILL: NOT AT ALL

## 2022-08-17 ASSESSMENT — COLUMBIA-SUICIDE SEVERITY RATING SCALE - C-SSRS
1. SINCE LAST CONTACT, HAVE YOU WISHED YOU WERE DEAD OR WISHED YOU COULD GO TO SLEEP AND NOT WAKE UP?: NO
SUICIDE, SINCE LAST CONTACT: NO
TOTAL  NUMBER OF ABORTED OR SELF INTERRUPTED ATTEMPTS SINCE LAST CONTACT: NO
2. HAVE YOU ACTUALLY HAD ANY THOUGHTS OF KILLING YOURSELF?: NO
ATTEMPT SINCE LAST CONTACT: NO
6. HAVE YOU EVER DONE ANYTHING, STARTED TO DO ANYTHING, OR PREPARED TO DO ANYTHING TO END YOUR LIFE?: NO
TOTAL  NUMBER OF INTERRUPTED ATTEMPTS SINCE LAST CONTACT: NO

## 2022-08-17 ASSESSMENT — PATIENT HEALTH QUESTIONNAIRE - PHQ9
5. POOR APPETITE OR OVEREATING: NOT AT ALL
SUM OF ALL RESPONSES TO PHQ QUESTIONS 1-9: 0

## 2022-08-17 NOTE — PROGRESS NOTES
"    [facility]                                     Progress Note    Patient Name: Laine Sharma  Date:8/17/2022         Service Type: Individual      Session Start Time:9:02  Session End Time:9:48     Session Length: 46    Session #:35    Attendees: Client    Service Modality:  Phone Visit:      Provider verified identity through the following two step process.  Patient provided:  Patient is known previously to provider    The patient has been notified of the following:      \"We have found that certain health care needs can be provided without the need for a face to face visit.  This service lets us provide the care you need with a phone conversation.       I will have full access to your [facility] medical record during this entire phone call.   I will be taking notes for your medical record.      Since this is like an office visit, we will bill your insurance company for this service.       There are potential benefits and risks of telephone visits (e.g. limits to patient confidentiality) that differ from in-person visits.?Confidentiality still applies for telephone services, and nobody will record the visit.  It is important to be in a quiet, private space that is free of distractions (including cell phone or other devices) during the visit.??      If during the course of the call I believe a telephone visit is not appropriate, you will not be charged for this service\"     Consent has been obtained for this service by care team member: Yes     DATA  Interactive Complexity: No  Crisis: No      Progress Since Last Session (Related to Symptoms / Goals / Homework):   Symptoms: Improving : appears to be achieving her level of acceptance    Homework: Achieved / completed to satisfaction      Episode of Care Goals: Achieved / completed to satisfaction - ACTION (Actively working towards change); Intervened by reinforcing change plan / affirming steps taken-Patient is still working toward " acceptance.     Current / Ongoing Stressors and Concerns: Patient reports having been doing well. Keeps in touch with family. Has been organizing her home giving unneeded items away. Finds it is a big project as she still has issues with her thumb.Concerns about her family and items in the closet of her late . She feels it is hard to share them to her 5 step children. These are items are not listed in his will. Has been trying to keep her evenings less sad by watching sports and reading. The [employer]ast[employer] leader still comes to visit her. She walks her cat in the carryon 2 times a day. Though feels her weight is still very high. Will start watching what she eats and her portions. Over all sheunderstands grief is a personal business, a no judgement zone, and she can allow herself feel. She had an opportunity to visit her late 's grave with a family member. Wishes to go there more often to reconnect. She is also aware that she has had many good memories to focus on. Her next phone call is in a month     Treatment Objective(s) Addressed in This Session:   identify at 2 strategies to more effectively address stressors. Making progress toward acceptance     Intervention:   Grief processing; problem solving    Assessments completed prior to visit:10/10/2020    The following assessments were completed by patient for this visit:  PHQ2:   PHQ-2 ( 1999 [employer]) 8/17/2022 7/8/2022 5/11/2022 5/4/2022 4/20/2022 3/28/2022 3/8/2022   Q1: Little interest or pleasure in doing things 0 0 0 0 0 0 0   Q2: Feeling down, depressed or hopeless 0 0 0 0 0 0 0   PHQ-2 Score 0 0 0 0 0 0 0   PHQ-2 Total Score (12-17 Years)- Positive if 3 or more points; Administer PHQ-A if positive - - - - - - -   Q1: Little interest or pleasure in doing things - - - Not at all - - -   Q2: Feeling down, depressed or hopeless - - - Not at all - - -   PHQ-2 Score - - - 0 - - -     PHQ9:   PHQ-9 SCORE 9/29/2021 10/27/2021 11/17/2021 12/1/2021 12/15/2021  2/2/2022 8/17/2022   PHQ-9 Total Score 0 0 0 0 0 0 0     GAD7:   CHAU-7 SCORE 2/23/2022 3/8/2022 3/28/2022 4/20/2022 5/11/2022 7/8/2022 8/17/2022   Total Score 0 0 1 0 0 0 0     CAGE-AID:   CAGE-AID Total Score 2/2/2022 2/2/2022 3/8/2022 3/8/2022 5/11/2022 7/8/2022 8/17/2022   Total Score 0 0 0 0 0 0 0     PROMIS 10-Global Health (all questions and answers displayed):   PROMIS 10 1/5/2022 3/8/2022 5/11/2022 7/8/2022 8/17/2022   In general, would you say your health is: 3 3 3 3 3   In general, would you say your quality of life is: 3 3 3 3 3   In general, how would you rate your physical health? 3 3 3 3 3   In general, how would you rate your mental health, including your mood and your ability to think? 3 3 3 3 3   In general, how would you rate your satisfaction with your social activities and relationships? 3 3 3 3 3   In general, please rate how well you carry out your usual social activities and roles. (This includes activities at home, at work and in your community, and responsibilities as a parent, child, spouse, employee, friend, etc.) 3 3 3 3 3   To what extent are you able to carry out your everyday physical activities such as walking, climbing stairs, carrying groceries, or moving a chair? 3 3 3 3 3   In the past 7 days, how often have you been bothered by emotional problems such as feeling anxious, depressed, or irritable? 1 3 2 3 3   In the past 7 days, how would you rate your fatigue on average? 3 3 2 2 2   In the past 7 days, how would you rate your pain on average, where 0 means no pain, and 10 means worst imaginable pain? 3 5 5 4 4   Global Mental Health Score 14 12 13 12 12   Global Physical Health Score 13 12 13 13 13   PROMIS TOTAL - SUBSCORES 27 24 26 25 25   Some recent data might be hidden     [place] Suicide Severity Rating Scale (Short Version)  [place] Suicide Severity Rating (Short Version) 10/27/2021 2/2/2022 2/2/2022 3/8/2022 5/11/2022 7/8/2022 8/17/2022   Over the past 2 weeks have you  felt down, depressed, or hopeless? no no no - - - -   Over the past 2 weeks have you had thoughts of killing yourself? no no no - - - -   Have you ever attempted to kill yourself? no no no - - - -   1. Wish to be Dead (Since Last Contact) - - - 0 0 0 0   2. Non-Specific Active Suicidal Thoughts (Since Last Contact) - - - 0 0 0 0   Actual Attempt (Since Last Contact) - - - - 0 0 0   Has subject engaged in non-suicidal self-injurious behavior? (Since Last Contact) - - - 0 0 0 0   Interrupted Attempts (Since Last Contact) - - - 0 0 0 0   Aborted or Self-Interrupted Attempt (Since Last Contact) - - - 0 0 0 0   Preparatory Acts or Behavior (Since Last Contact) - - - 0 0 0 0   Suicide (Since Last Contact) - - - 0 0 0 0   Calculated C-SSRS Risk Score (Since Last Contact) - - - No Risk Indicated No Risk Indicated No Risk Indicated No Risk Indicated      ASSESSMENT: Current Emotional / Mental Status (status of significant symptoms):   Risk status (Self / Other harm or suicidal ideation)   Patient denies current fears or concerns for personal safety.   Patient denies current or recent suicidal ideation or behaviors.   Patient denies current or recent homicidal ideation or behaviors.   Patient denies current or recent self injurious behavior or ideation.   Patient denies other safety concerns.   Patient reports there has been no change in risk factors since their last session.     Patient reports there has been no change in protective factors since their last session.     Recommended that patient call 911 or go to the local ED should there be a change in any of these risk factors.     Appearance:                            not seen, not assessed               Eye Contact:                           Good               Psychomotor Behavior:          not assessed, not seen               Attitude:                                   Cooperative               Orientation:                             Person Place Time Situation               Speech                          Rate / Production:       Normal/ Responsive                          Volume:                       Normal               Mood:                                      Not assessed, not seen              Affect:                                      Appropriate               Thought Content:                    Clear               Thought Form:                        Coherent  Logical               Insight:                                     Good      Medication Review:   No current psychiatric medications prescribed     Medication Compliance:   NA     Allergies   Allergen Reactions     Iodine Unknown      Changes in Health Issues:   None reported     Chemical Use Review:   Substance Use: Chemical use reviewed, no active concerns identified      Tobacco Use: No current tobacco use.      Diagnosis:  1. Adjustment disorder with depressed mood      Collateral Reports Completed:   Routed note to PCP    PLAN: (Patient Tasks / Therapist Tasks / Other): 8/17/2022  Patient talk to her friends/family members  when she feels sad for support.  Patient will keep up with her walks with her cat when weather permits to distract herself and stay in shape.  Patient will continue her reading and watch her favorite suppot in evenings to distract her mind from her loss   Patient will call her best friend at least once a week  Patient's next appt in 2 weeks    Rosa Stafford, FEDESW  _________________________________________________________________    Individual Treatment Plan    Patient's Name: Laine Sharma  YOB: 1933    Date of Creation:10/28/2020    Date Treatment Plan Last Reviewed/Revised: 8/17/2022    DSM5 Diagnoses: Adjustment Disorders  309.0 (F43.21) With depressed mood; Grief reaction [F43.21]    Psychosocial / Contextual Factors: Grief: lost  after over 40 years of marriage. Limited mobility to get doing.     PROMIS (reviewed every 90 days): 26    Referral /  Collaboration:  Referral to another professional/service is not indicated at this time..    Anticipated number of session for this episode of care: 12  Anticipation frequency of session: Biweekly  Anticipated Duration of each session: 38-52 minutes  Treatment plan will be reviewed in 90 days or when goals have been changed.     MeasurableTreatment Goal(s) related to diagnosis / functional impairment(s)  Goal 1: Patient will talk to her friend or sister daily for suppot    I will know I've met my goal when I don't cry every time I am alone.      Objective #A (Patient Action)    Patient will increase understanding of steps in the grief process.  Status: Continued - Date(s): 8/17/2022    Intervention(s)  Therapist will assign homework : go to [employer] with neighbor friend.    Objective #B  Patient will increase understanding of steps in the grief process.  Status: Continued - Date(s): 8/17/2022    Intervention(s)  Therapist will teach emotional regulation skills. asisting patient to create, collect memories about .    Objective #C  Patient will identify 3 strategies to more effectively address stressors.  Status: Continued - Date(s): 8/17/2022    Intervention(s)  Therapist will Continue to assist patient with looking for open grief suppot near her home.     Patient has reviewed and agreed to the above plan .    JOAQUIM Newell  August 17th 2022

## 2022-09-19 ENCOUNTER — VIRTUAL VISIT (OUTPATIENT)
Dept: PSYCHOLOGY | Facility: CLINIC | Age: 87
End: 2022-09-19
Payer: COMMERCIAL

## 2022-09-19 DIAGNOSIS — F43.21 ADJUSTMENT DISORDER WITH DEPRESSED MOOD: Primary | ICD-10-CM

## 2022-09-19 PROCEDURE — 90837 PSYTX W PT 60 MINUTES: CPT | Mod: 95 | Performed by: SOCIAL WORKER

## 2022-09-19 ASSESSMENT — PATIENT HEALTH QUESTIONNAIRE - PHQ9
5. POOR APPETITE OR OVEREATING: NOT AT ALL
SUM OF ALL RESPONSES TO PHQ QUESTIONS 1-9: 4

## 2022-09-19 ASSESSMENT — ANXIETY QUESTIONNAIRES
IF YOU CHECKED OFF ANY PROBLEMS ON THIS QUESTIONNAIRE, HOW DIFFICULT HAVE THESE PROBLEMS MADE IT FOR YOU TO DO YOUR WORK, TAKE CARE OF THINGS AT HOME, OR GET ALONG WITH OTHER PEOPLE: NOT DIFFICULT AT ALL
GAD7 TOTAL SCORE: 1
GAD7 TOTAL SCORE: 1
2. NOT BEING ABLE TO STOP OR CONTROL WORRYING: NOT AT ALL
5. BEING SO RESTLESS THAT IT IS HARD TO SIT STILL: NOT AT ALL
3. WORRYING TOO MUCH ABOUT DIFFERENT THINGS: NOT AT ALL
1. FEELING NERVOUS, ANXIOUS, OR ON EDGE: SEVERAL DAYS
6. BECOMING EASILY ANNOYED OR IRRITABLE: NOT AT ALL
7. FEELING AFRAID AS IF SOMETHING AWFUL MIGHT HAPPEN: NOT AT ALL

## 2022-09-19 NOTE — PROGRESS NOTES
"    [facility] Counseling                                     Progress Note    Patient Name: Laine Sharma  Date:9/19/2022         Service Type: Individual      Session Start Time:9:03  Session End Time:9:56     Session Length: 53    Session #:36    Attendees: Client    Service Modality:  Phone Visit:      Provider verified identity through the following two step process.  Patient provided:  Patient is known previously to provider    The patient has been notified of the following:      \"We have found that certain health care needs can be provided without the need for a face to face visit.  This service lets us provide the care you need with a phone conversation.       I will have full access to your [facility] medical record during this entire phone call.   I will be taking notes for your medical record.      Since this is like an office visit, we will bill your insurance company for this service.       There are potential benefits and risks of telephone visits (e.g. limits to patient confidentiality) that differ from in-person visits.?Confidentiality still applies for telephone services, and nobody will record the visit.  It is important to be in a quiet, private space that is free of distractions (including cell phone or other devices) during the visit.??      If during the course of the call I believe a telephone visit is not appropriate, you will not be charged for this service\"     Consent has been obtained for this service by care team member: Yes     DATA  Interactive Complexity: No  Crisis: No      Progress Since Last Session (Related to Symptoms / Goals / Homework):   Symptoms: Improving : appears to be achieving her level of acceptance    Homework: Achieved / completed to satisfaction      Episode of Care Goals: Achieved / completed to satisfaction - ACTION (Actively working towards change); Intervened by reinforcing change plan / affirming steps taken-Patient is still working toward " acceptance.     Current / Ongoing Stressors and Concerns: Patient reports having been doing well. Still has her moments which are in the evenings. Still has issue with her thumb, does not wish to have a surgery as none would care for me and my cat. Except family has told her that they can help. Had made a decision to stay in [place] but can visit her brother in [place]. Plans to visit 's grave this week with her brother in law. Patient has a family support and gets regular visits from her  and cards from her [employer]. She notes she is doing her best and notes her cat is her biggest support. No other concerns reported today. Her next visit is in a month.     Treatment Objective(s) Addressed in This Session:   identify at 2 strategies to more effectively address stressors. Still is working on acceptance     Intervention:   Grief processing; problem solving    Assessments completed prior to visit:10/10/2020    The following assessments were completed by patient for this visit:  PHQ2:   PHQ-2 ( 1999 [employer]) 8/17/2022 7/8/2022 5/11/2022 5/4/2022 4/20/2022 3/28/2022 3/8/2022   Q1: Little interest or pleasure in doing things 0 0 0 0 0 0 0   Q2: Feeling down, depressed or hopeless 0 0 0 0 0 0 0   PHQ-2 Score 0 0 0 0 0 0 0   PHQ-2 Total Score (12-17 Years)- Positive if 3 or more points; Administer PHQ-A if positive - - - - - - -   Q1: Little interest or pleasure in doing things - - - Not at all - - -   Q2: Feeling down, depressed or hopeless - - - Not at all - - -   PHQ-2 Score - - - 0 - - -     PHQ9:   PHQ-9 SCORE 10/27/2021 11/17/2021 12/1/2021 12/15/2021 2/2/2022 8/17/2022 9/19/2022   PHQ-9 Total Score 0 0 0 0 0 0 4     GAD7:   CHAU-7 SCORE 3/8/2022 3/28/2022 4/20/2022 5/11/2022 7/8/2022 8/17/2022 9/19/2022   Total Score 0 1 0 0 0 0 1     CAGE-AID:   CAGE-AID Total Score 2/2/2022 2/2/2022 3/8/2022 3/8/2022 5/11/2022 7/8/2022 8/17/2022   Total Score 0 0 0 0 0 0 0     PROMIS 10-Global Health (all questions and answers  displayed):   PROMIS 10 1/5/2022 3/8/2022 5/11/2022 7/8/2022 8/17/2022   In general, would you say your health is: 3 3 3 3 3   In general, would you say your quality of life is: 3 3 3 3 3   In general, how would you rate your physical health? 3 3 3 3 3   In general, how would you rate your mental health, including your mood and your ability to think? 3 3 3 3 3   In general, how would you rate your satisfaction with your social activities and relationships? 3 3 3 3 3   In general, please rate how well you carry out your usual social activities and roles. (This includes activities at home, at work and in your community, and responsibilities as a parent, child, spouse, employee, friend, etc.) 3 3 3 3 3   To what extent are you able to carry out your everyday physical activities such as walking, climbing stairs, carrying groceries, or moving a chair? 3 3 3 3 3   In the past 7 days, how often have you been bothered by emotional problems such as feeling anxious, depressed, or irritable? 1 3 2 3 3   In the past 7 days, how would you rate your fatigue on average? 3 3 2 2 2   In the past 7 days, how would you rate your pain on average, where 0 means no pain, and 10 means worst imaginable pain? 3 5 5 4 4   Global Mental Health Score 14 12 13 12 12   Global Physical Health Score 13 12 13 13 13   PROMIS TOTAL - SUBSCORES 27 24 26 25 25   Some recent data might be hidden     [place] Suicide Severity Rating Scale (Short Version)  [place] Suicide Severity Rating (Short Version) 10/27/2021 2/2/2022 2/2/2022 3/8/2022 5/11/2022 7/8/2022 8/17/2022   Over the past 2 weeks have you felt down, depressed, or hopeless? no no no - - - -   Over the past 2 weeks have you had thoughts of killing yourself? no no no - - - -   Have you ever attempted to kill yourself? no no no - - - -   1. Wish to be Dead (Since Last Contact) - - - 0 0 0 0   2. Non-Specific Active Suicidal Thoughts (Since Last Contact) - - - 0 0 0 0   Actual Attempt (Since Last  Contact) - - - - 0 0 0   Has subject engaged in non-suicidal self-injurious behavior? (Since Last Contact) - - - 0 0 0 0   Interrupted Attempts (Since Last Contact) - - - 0 0 0 0   Aborted or Self-Interrupted Attempt (Since Last Contact) - - - 0 0 0 0   Preparatory Acts or Behavior (Since Last Contact) - - - 0 0 0 0   Suicide (Since Last Contact) - - - 0 0 0 0   Calculated C-SSRS Risk Score (Since Last Contact) - - - No Risk Indicated No Risk Indicated No Risk Indicated No Risk Indicated      ASSESSMENT: Current Emotional / Mental Status (status of significant symptoms):   Risk status (Self / Other harm or suicidal ideation)   Patient denies current fears or concerns for personal safety.   Patient denies current or recent suicidal ideation or behaviors.   Patient denies current or recent homicidal ideation or behaviors.   Patient denies current or recent self injurious behavior or ideation.   Patient denies other safety concerns.   Patient reports there has been no change in risk factors since their last session.     Patient reports there has been no change in protective factors since their last session.     Recommended that patient call 911 or go to the local ED should there be a change in any of these risk factors.     Appearance:                            not seen, not assessed               Eye Contact:                           Good               Psychomotor Behavior:          not assessed, not seen               Attitude:                                   Cooperative               Orientation:                             Person Place Time Situation              Speech                          Rate / Production:       Normal/ Responsive                          Volume:                       Normal               Mood:                                      Not assessed, not seen              Affect:                                      Appropriate               Thought Content:                    Clear                Thought Form:                        Coherent  Logical               Insight:                                     Good      Medication Review:   No current psychiatric medications prescribed     Medication Compliance:   NA     Allergies   Allergen Reactions     Iodine Unknown      Changes in Health Issues:   None reported     Chemical Use Review:   Substance Use: Chemical use reviewed, no active concerns identified      Tobacco Use: No current tobacco use.      Diagnosis:  1. Adjustment disorder with depressed mood      Collateral Reports Completed:   Routed note to PCP    PLAN: (Patient Tasks / Therapist Tasks / Other):   Patient talk to her friends/family members  when she feels sad for support.  Patient will keep up with her walks with her cat when weather permits to distract herself and stay in shape.  Patient will continue her reading and watch her favorite suppot in evenings to distract her mind from her loss   Patient will remain in touch with her  and other [employer] members for her spiritual support.   Patient will call her best friend at least once a week  Patient's next appt in a month    Rosa Stafford, LICSW  _________________________________________________________________    Individual Treatment Plan    Patient's Name: Laine Sharma  YOB: 1933    Date of Creation:10/28/2020    Date Treatment Plan Last Reviewed/Revised: 8/17/2022    DSM5 Diagnoses: Adjustment Disorders  309.0 (F43.21) With depressed mood; Grief reaction [F43.21]    Psychosocial / Contextual Factors: Grief: lost  after over 40 years of marriage. Limited mobility to get doing.     PROMIS (reviewed every 90 days): 26    Referral / Collaboration:  Referral to another professional/service is not indicated at this time..    Anticipated number of session for this episode of care: 12  Anticipation frequency of session: Biweekly  Anticipated Duration of each session: 38-52 minutes  Treatment plan will be reviewed in  90 days or when goals have been changed.     MeasurableTreatment Goal(s) related to diagnosis / functional impairment(s)  Goal 1: Patient will talk to her friend or sister daily for suppot    I will know I've met my goal when I don't cry every time I am alone.      Objective #A (Patient Action)    Patient will increase understanding of steps in the grief process.  Status: Continued - Date(s): 8/17/2022    Intervention(s)  Therapist will assign homework : go to [employer] with neighbor friend.    Objective #B  Patient will increase understanding of steps in the grief process.  Status: Continued - Date(s): 8/17/2022    Intervention(s)  Therapist will teach emotional regulation skills. asisting patient to create, collect memories about .    Objective #C  Patient will identify 3 strategies to more effectively address stressors.  Status: Continued - Date(s): 8/17/2022    Intervention(s)  Therapist will Continue to assist patient with looking for open grief suppot near her home.     Patient has reviewed and agreed to the above plan .    JOAQUIM Newell  August 17th 2022

## 2022-10-19 ENCOUNTER — VIRTUAL VISIT (OUTPATIENT)
Dept: PSYCHOLOGY | Facility: CLINIC | Age: 87
End: 2022-10-19
Payer: COMMERCIAL

## 2022-10-19 DIAGNOSIS — F43.21 ADJUSTMENT DISORDER WITH DEPRESSED MOOD: Primary | ICD-10-CM

## 2022-10-19 PROCEDURE — 90837 PSYTX W PT 60 MINUTES: CPT | Mod: 95 | Performed by: SOCIAL WORKER

## 2022-10-19 NOTE — PROGRESS NOTES
"    [facility]                                     Progress Note    Patient Name: Laine Sharma  Date:10/19/2022         Service Type: Individual      Session Start Time:9:05  Session End Time:9:58     Session Length: 53    Session #:36    Attendees: Client    Service Modality:  Phone Visit:      Provider verified identity through the following two step process.  Patient provided:  Patient is known previously to provider    The patient has been notified of the following:      \"We have found that certain health care needs can be provided without the need for a face to face visit.  This service lets us provide the care you need with a phone conversation.       I will have full access to your [facility] medical record during this entire phone call.   I will be taking notes for your medical record.      Since this is like an office visit, we will bill your insurance company for this service.       There are potential benefits and risks of telephone visits (e.g. limits to patient confidentiality) that differ from in-person visits.?Confidentiality still applies for telephone services, and nobody will record the visit.  It is important to be in a quiet, private space that is free of distractions (including cell phone or other devices) during the visit.??      If during the course of the call I believe a telephone visit is not appropriate, you will not be charged for this service\"     Consent has been obtained for this service by care team member: Yes     DATA  Interactive Complexity: No  Crisis: No      Progress Since Last Session (Related to Symptoms / Goals / Homework):   Symptoms: Improving : appears to be achieving her level of acceptance    Homework: Achieved / completed to satisfaction      Episode of Care Goals: Achieved / completed to satisfaction - ACTION (Actively working towards change); Intervened by reinforcing change plan / affirming steps taken-Patient is still working toward " acceptance.     Current / Ongoing Stressors and Concerns: Patient has been doing well. Still is adjusting to living alone. She notes the only concerns she keeps experiencing is her evening. She also notes her thumb is still bothering her. Today, she explained to writer that she is afraid of surgery and doesn't want to have her family take break from their daily activities. She also understands they have offered to help and they include her in their responsibilities. She will think about this and let her PCP know if she is ready for her surgery.  She is planning to go to [place] to visit her step daughter around [place]. The ticket is already purchased. She is still in touch with her  and wife and other [employer] members. She spends some time with her cat Dina as well.  Her next visit is in a month.     Treatment Objective(s) Addressed in This Session:   identify at 2 strategies to more effectively address stressors. Still is working on acceptance     Intervention:   Grief processing; problem solving    Assessments completed prior to visit:10/10/2020    The following assessments were completed by patient for this visit:  PHQ2:   PHQ-2 ( 1999 Pf[employer]) 8/17/2022 7/8/2022 5/11/2022 5/4/2022 4/20/2022 3/28/2022 3/8/2022   Q1: Little interest or pleasure in doing things 0 0 0 0 0 0 0   Q2: Feeling down, depressed or hopeless 0 0 0 0 0 0 0   PHQ-2 Score 0 0 0 0 0 0 0   PHQ-2 Total Score (12-17 Years)- Positive if 3 or more points; Administer PHQ-A if positive - - - - - - -   Q1: Little interest or pleasure in doing things - - - Not at all - - -   Q2: Feeling down, depressed or hopeless - - - Not at all - - -   PHQ-2 Score - - - 0 - - -     PHQ9:   PHQ-9 SCORE 10/27/2021 11/17/2021 12/1/2021 12/15/2021 2/2/2022 8/17/2022 9/19/2022   PHQ-9 Total Score 0 0 0 0 0 0 4     GAD7:   CHAU-7 SCORE 3/8/2022 3/28/2022 4/20/2022 5/11/2022 7/8/2022 8/17/2022 9/19/2022   Total Score 0 1 0 0 0 0 1     CAGE-AID:   CAGE-AID Total Score  2/2/2022 2/2/2022 3/8/2022 3/8/2022 5/11/2022 7/8/2022 8/17/2022   Total Score 0 0 0 0 0 0 0     PROMIS 10-Global Health (all questions and answers displayed):   PROMIS 10 1/5/2022 3/8/2022 5/11/2022 7/8/2022 8/17/2022   In general, would you say your health is: 3 3 3 3 3   In general, would you say your quality of life is: 3 3 3 3 3   In general, how would you rate your physical health? 3 3 3 3 3   In general, how would you rate your mental health, including your mood and your ability to think? 3 3 3 3 3   In general, how would you rate your satisfaction with your social activities and relationships? 3 3 3 3 3   In general, please rate how well you carry out your usual social activities and roles. (This includes activities at home, at work and in your community, and responsibilities as a parent, child, spouse, employee, friend, etc.) 3 3 3 3 3   To what extent are you able to carry out your everyday physical activities such as walking, climbing stairs, carrying groceries, or moving a chair? 3 3 3 3 3   In the past 7 days, how often have you been bothered by emotional problems such as feeling anxious, depressed, or irritable? 1 3 2 3 3   In the past 7 days, how would you rate your fatigue on average? 3 3 2 2 2   In the past 7 days, how would you rate your pain on average, where 0 means no pain, and 10 means worst imaginable pain? 3 5 5 4 4   Global Mental Health Score 14 12 13 12 12   Global Physical Health Score 13 12 13 13 13   PROMIS TOTAL - SUBSCORES 27 24 26 25 25   Some recent data might be hidden     [place]in Suicide Severity Rating Scale (Short Version)  Mar[place] Suicide Severity Rating (Short Version) 10/27/2021 2/2/2022 2/2/2022 3/8/2022 5/11/2022 7/8/2022 8/17/2022   Over the past 2 weeks have you felt down, depressed, or hopeless? no no no - - - -   Over the past 2 weeks have you had thoughts of killing yourself? no no no - - - -   Have you ever attempted to kill yourself? no no no - - - -   1. Wish to  be Dead (Since Last Contact) - - - 0 0 0 0   2. Non-Specific Active Suicidal Thoughts (Since Last Contact) - - - 0 0 0 0   Actual Attempt (Since Last Contact) - - - - 0 0 0   Has subject engaged in non-suicidal self-injurious behavior? (Since Last Contact) - - - 0 0 0 0   Interrupted Attempts (Since Last Contact) - - - 0 0 0 0   Aborted or Self-Interrupted Attempt (Since Last Contact) - - - 0 0 0 0   Preparatory Acts or Behavior (Since Last Contact) - - - 0 0 0 0   Suicide (Since Last Contact) - - - 0 0 0 0   Calculated C-SSRS Risk Score (Since Last Contact) - - - No Risk Indicated No Risk Indicated No Risk Indicated No Risk Indicated      ASSESSMENT: Current Emotional / Mental Status (status of significant symptoms):   Risk status (Self / Other harm or suicidal ideation)   Patient denies current fears or concerns for personal safety.   Patient denies current or recent suicidal ideation or behaviors.   Patient denies current or recent homicidal ideation or behaviors.   Patient denies current or recent self injurious behavior or ideation.   Patient denies other safety concerns.   Patient reports there has been no change in risk factors since their last session.     Patient reports there has been no change in protective factors since their last session.     Recommended that patient call 911 or go to the local ED should there be a change in any of these risk factors.     Appearance:                            not seen, not assessed               Eye Contact:                           Good               Psychomotor Behavior:          not assessed, not seen               Attitude:                                   Cooperative               Orientation:                             Person Place Time Situation              Speech                          Rate / Production:       Normal/ Responsive                          Volume:                       Normal               Mood:                                      Not  assessed, not seen              Affect:                                      Appropriate               Thought Content:                    Clear               Thought Form:                        Coherent  Logical               Insight:                                     Good      Medication Review:   No current psychiatric medications prescribed     Medication Compliance:   NA     Allergies   Allergen Reactions     Iodine Unknown      Changes in Health Issues:   None reported     Chemical Use Review:   Substance Use: Chemical use reviewed, no active concerns identified      Tobacco Use: No current tobacco use.      Diagnosis:  1. Adjustment disorder with depressed mood      Collateral Reports Completed:   Routed note to PCP    PLAN: (Patient Tasks / Therapist Tasks / Other):   Patient talk to her friends/family members  when she feels sad for support.  Patient will keep up with her walks with her cat when weather permits to distract herself and stay in shape.  Patient will continue her reading and watch her favorite suppot in evenings to distract her mind from her loss   Patient will remain in touch with her  and other [employer] members for her spiritual support.   Patient will talk to her family about her fear related to her surgery on her thumb  Patient will call her best friend at least once a week  Patient's next appt in a month    JOAQUIM Newell  _________________________________________________________________    Individual Treatment Plan    Patient's Name: Laine Sharma  YOB: 1933    Date of Creation:10/28/2020    Date Treatment Plan Last Reviewed/Revised: 8/17/2022    DSM5 Diagnoses: Adjustment Disorders  309.0 (F43.21) With depressed mood; Grief reaction [F43.21]    Psychosocial / Contextual Factors: Grief: lost  after over 40 years of marriage. Limited mobility to get doing.     PROMIS (reviewed every 90 days): 26    Referral / Collaboration:  Referral to another  professional/service is not indicated at this time..    Anticipated number of session for this episode of care: 12  Anticipation frequency of session: Biweekly  Anticipated Duration of each session: 38-52 minutes  Treatment plan will be reviewed in 90 days or when goals have been changed.     MeasurableTreatment Goal(s) related to diagnosis / functional impairment(s)  Goal 1: Patient will talk to her friend or sister daily for suppot    I will know I've met my goal when I don't cry every time I am alone.      Objective #A (Patient Action)    Patient will increase understanding of steps in the grief process.  Status: Continued - Date(s): 8/17/2022    Intervention(s)  Therapist will assign homework : go to [employer] with neighbor friend.    Objective #B  Patient will increase understanding of steps in the grief process.  Status: Continued - Date(s): 8/17/2022    Intervention(s)  Therapist will teach emotional regulation skills. asisting patient to create, collect memories about .    Objective #C  Patient will identify 3 strategies to more effectively address stressors.  Status: Continued - Date(s): 8/17/2022    Intervention(s)  Therapist will Continue to assist patient with looking for open grief suppot near her home.     Patient has reviewed and agreed to the above plan .    JOAQUIM Newell  August 17th 2022

## 2022-11-22 ENCOUNTER — VIRTUAL VISIT (OUTPATIENT)
Dept: PSYCHOLOGY | Facility: CLINIC | Age: 87
End: 2022-11-22
Payer: COMMERCIAL

## 2022-11-22 DIAGNOSIS — F32.9 MAJOR DEPRESSIVE DISORDER, REMISSION STATUS UNSPECIFIED, UNSPECIFIED WHETHER RECURRENT: Primary | ICD-10-CM

## 2022-11-22 PROCEDURE — 90837 PSYTX W PT 60 MINUTES: CPT | Mod: 95 | Performed by: SOCIAL WORKER

## 2022-11-22 ASSESSMENT — ANXIETY QUESTIONNAIRES
GAD7 TOTAL SCORE: 0
IF YOU CHECKED OFF ANY PROBLEMS ON THIS QUESTIONNAIRE, HOW DIFFICULT HAVE THESE PROBLEMS MADE IT FOR YOU TO DO YOUR WORK, TAKE CARE OF THINGS AT HOME, OR GET ALONG WITH OTHER PEOPLE: NOT DIFFICULT AT ALL
GAD7 TOTAL SCORE: 0
1. FEELING NERVOUS, ANXIOUS, OR ON EDGE: NOT AT ALL
7. FEELING AFRAID AS IF SOMETHING AWFUL MIGHT HAPPEN: NOT AT ALL
3. WORRYING TOO MUCH ABOUT DIFFERENT THINGS: NOT AT ALL
5. BEING SO RESTLESS THAT IT IS HARD TO SIT STILL: NOT AT ALL
6. BECOMING EASILY ANNOYED OR IRRITABLE: NOT AT ALL
2. NOT BEING ABLE TO STOP OR CONTROL WORRYING: NOT AT ALL

## 2022-11-22 ASSESSMENT — COLUMBIA-SUICIDE SEVERITY RATING SCALE - C-SSRS
2. HAVE YOU ACTUALLY HAD ANY THOUGHTS OF KILLING YOURSELF?: NO
SUICIDE, SINCE LAST CONTACT: NO
TOTAL  NUMBER OF INTERRUPTED ATTEMPTS SINCE LAST CONTACT: NO
TOTAL  NUMBER OF ABORTED OR SELF INTERRUPTED ATTEMPTS SINCE LAST CONTACT: NO
1. SINCE LAST CONTACT, HAVE YOU WISHED YOU WERE DEAD OR WISHED YOU COULD GO TO SLEEP AND NOT WAKE UP?: NO
6. HAVE YOU EVER DONE ANYTHING, STARTED TO DO ANYTHING, OR PREPARED TO DO ANYTHING TO END YOUR LIFE?: NO
ATTEMPT SINCE LAST CONTACT: NO

## 2022-11-22 ASSESSMENT — PATIENT HEALTH QUESTIONNAIRE - PHQ9
5. POOR APPETITE OR OVEREATING: NOT AT ALL
SUM OF ALL RESPONSES TO PHQ QUESTIONS 1-9: 0

## 2022-11-22 NOTE — PROGRESS NOTES
"    [facility]                                     Progress Note    Patient Name: Laine Sharma  Date:11/22/2022         Service Type: Individual      Session Start Time:9:05  Session End Time:9:58     Session Length: 53    Session #:37    Attendees: Client    Service Modality:  Phone Visit:      Provider verified identity through the following two step process.  Patient provided:  Patient is known previously to provider    The patient has been notified of the following:      \"We have found that certain health care needs can be provided without the need for a face to face visit.  This service lets us provide the care you need with a phone conversation.       I will have full access to your [facility] medical record during this entire phone call.   I will be taking notes for your medical record.      Since this is like an office visit, we will bill your insurance company for this service.       There are potential benefits and risks of telephone visits (e.g. limits to patient confidentiality) that differ from in-person visits.?Confidentiality still applies for telephone services, and nobody will record the visit.  It is important to be in a quiet, private space that is free of distractions (including cell phone or other devices) during the visit.??      If during the course of the call I believe a telephone visit is not appropriate, you will not be charged for this service\"     Consent has been obtained for this service by care team member: Yes     DATA  Interactive Complexity: No  Crisis: No      Progress Since Last Session (Related to Symptoms / Goals / Homework):   Symptoms: Improving : appears to be achieving her level of acceptance    Homework: Achieved / completed to satisfaction      Episode of Care Goals: Achieved / completed to satisfaction - ACTION (Actively working towards change); Intervened by reinforcing change plan / affirming steps taken-Patient is still working toward " acceptance.     Current / Ongoing Stressors and Concerns: Patient reports she has been doing pretty well except her thumb still bothers her. Has been debating whether to accept her PCP referral for a hand specialist. Fears she will be asked to have a surgery.  Agreed to have these questions asked to her PCP and the hand specialist, whether the specialist can help with other intervention. Asked writer to communicate this to her PCP. Wants to try something a long as it is not a surgery. No other concern today. Still has a hard time in the evenings as she thinks about . Her cat helps. Will spend Thanksgiving with her family. Will get a call in Januarys for her therapy.      Treatment Objective(s) Addressed in This Session:   identify at 2 strategies to more effectively address stressors. Still is working on acceptance     Intervention:   Grief processing; problem solving    Assessments completed prior to visit:10/10/2020    The following assessments were completed by patient for this visit:  PHQ2:   PHQ-2 ( 1999 Pfizer) 11/22/2022 8/17/2022 7/8/2022 5/11/2022 5/4/2022 4/20/2022 3/28/2022   Q1: Little interest or pleasure in doing things 0 0 0 0 0 0 0   Q2: Feeling down, depressed or hopeless 0 0 0 0 0 0 0   PHQ-2 Score 0 0 0 0 0 0 0   PHQ-2 Total Score (12-17 Years)- Positive if 3 or more points; Administer PHQ-A if positive - - - - - - -   Q1: Little interest or pleasure in doing things - - - - Not at all - -   Q2: Feeling down, depressed or hopeless - - - - Not at all - -   PHQ-2 Score - - - - 0 - -     PHQ9:   PHQ-9 SCORE 11/17/2021 12/1/2021 12/15/2021 2/2/2022 8/17/2022 9/19/2022 11/22/2022   PHQ-9 Total Score 0 0 0 0 0 4 0     GAD7:   CHAU-7 SCORE 3/28/2022 4/20/2022 5/11/2022 7/8/2022 8/17/2022 9/19/2022 11/22/2022   Total Score 1 0 0 0 0 1 0     CAGE-AID:   CAGE-AID Total Score 2/2/2022 3/8/2022 3/8/2022 5/11/2022 7/8/2022 8/17/2022 11/22/2022   Total Score 0 0 0 0 0 0 0     PROMIS 10-Global Health (all  questions and answers displayed):   PROMIS 10 1/5/2022 3/8/2022 5/11/2022 7/8/2022 8/17/2022 11/22/2022   In general, would you say your health is: 3 3 3 3 3 3   In general, would you say your quality of life is: 3 3 3 3 3 3   In general, how would you rate your physical health? 3 3 3 3 3 3   In general, how would you rate your mental health, including your mood and your ability to think? 3 3 3 3 3 3   In general, how would you rate your satisfaction with your social activities and relationships? 3 3 3 3 3 3   In general, please rate how well you carry out your usual social activities and roles. (This includes activities at home, at work and in your community, and responsibilities as a parent, child, spouse, employee, friend, etc.) 3 3 3 3 3 3   To what extent are you able to carry out your everyday physical activities such as walking, climbing stairs, carrying groceries, or moving a chair? 3 3 3 3 3 2   In the past 7 days, how often have you been bothered by emotional problems such as feeling anxious, depressed, or irritable? 1 3 2 3 3 2   In the past 7 days, how would you rate your fatigue on average? 3 3 2 2 2 2   In the past 7 days, how would you rate your pain on average, where 0 means no pain, and 10 means worst imaginable pain? 3 5 5 4 4 3   Global Mental Health Score 14 12 13 12 12 13   Global Physical Health Score 13 12 13 13 13 13   PROMIS TOTAL - SUBSCORES 27 24 26 25 25 26   Some recent data might be hidden     [place]vens Suicide Severity Rating Scale (Short Version)  Stevens Suicide Severity Rating (Short Version) 2/2/2022 2/2/2022 3/8/2022 5/11/2022 7/8/2022 8/17/2022 11/22/2022   Over the past 2 weeks have you felt down, depressed, or hopeless? no no - - - - -   Over the past 2 weeks have you had thoughts of killing yourself? no no - - - - -   Have you ever attempted to kill yourself? no no - - - - -   1. Wish to be Dead (Since Last Contact) - - 0 0 0 0 0   2. Non-Specific Active Suicidal Thoughts  (Since Last Contact) - - 0 0 0 0 0   Actual Attempt (Since Last Contact) - - - 0 0 0 0   Has subject engaged in non-suicidal self-injurious behavior? (Since Last Contact) - - 0 0 0 0 0   Interrupted Attempts (Since Last Contact) - - 0 0 0 0 0   Aborted or Self-Interrupted Attempt (Since Last Contact) - - 0 0 0 0 0   Preparatory Acts or Behavior (Since Last Contact) - - 0 0 0 0 0   Suicide (Since Last Contact) - - 0 0 0 0 0   Calculated C-SSRS Risk Score (Since Last Contact) - - No Risk Indicated No Risk Indicated No Risk Indicated No Risk Indicated No Risk Indicated      ASSESSMENT: Current Emotional / Mental Status (status of significant symptoms):   Risk status (Self / Other harm or suicidal ideation)   Patient denies current fears or concerns for personal safety.   Patient denies current or recent suicidal ideation or behaviors.   Patient denies current or recent homicidal ideation or behaviors.   Patient denies current or recent self injurious behavior or ideation.   Patient denies other safety concerns.   Patient reports there has been no change in risk factors since their last session.     Patient reports there has been no change in protective factors since their last session.     Recommended that patient call 911 or go to the local ED should there be a change in any of these risk factors.     Appearance:                            not seen, not assessed               Eye Contact:                           Good               Psychomotor Behavior:          not assessed, not seen               Attitude:                                   Cooperative               Orientation:                             Person Place Time Situation              Speech                          Rate / Production:       Normal/ Responsive                          Volume:                       Normal               Mood:                                      Not assessed, not seen              Affect:                                       Appropriate               Thought Content:                    Clear               Thought Form:                        Coherent  Logical               Insight:                                     Good      Medication Review:   No current psychiatric medications prescribed     Medication Compliance:   NA     Allergies   Allergen Reactions     Iodine Unknown      Changes in Health Issues:   None reported     Chemical Use Review:   Substance Use: Chemical use reviewed, no active concerns identified      Tobacco Use: No current tobacco use.      Diagnosis:  1. Major depressive disorder, remission status unspecified, unspecified whether recurrent      Collateral Reports Completed:   Routed note to PCP    PLAN: (Patient Tasks / Therapist Tasks / Other):   Patient talk to her friends/family members  when she feels sad for support.  Patient will keep up with her walks with her cat when weather permits to distract herself and stay in shape.  Patient will continue her reading and watch her favorite suppot in evenings to distract her mind from her loss   Patient will remain in touch with her  and other [employer] members for her spiritual support.   Patient will talk to her family about her fear related to her surgery on her thumb  Patient will call her best friend at least once a week  Patient's next appt in a month    JOAQUIM Newell  _________________________________________________________________    Individual Treatment Plan    Patient's Name: Laine Sharma  YOB: 1933    Date of Creation:10/28/2020    Date Treatment Plan Last Reviewed/Revised: 11/22/2022    DSM5 Diagnoses: Adjustment Disorders  309.0 (F43.21) With depressed mood; Grief reaction [F43.21]    Psychosocial / Contextual Factors: Grief: lost  after over 40 years of marriage. Limited mobility to get doing.     PROMIS (reviewed every 90 days): 26    Referral / Collaboration:  Referral to another professional/service is not indicated  at this time..    Anticipated number of session for this episode of care: 12  Anticipation frequency of session: Biweekly  Anticipated Duration of each session: 38-52 minutes  Treatment plan will be reviewed in 90 days or when goals have been changed.     MeasurableTreatment Goal(s) related to diagnosis / functional impairment(s)  Goal 1: Patient will talk to her friend or sister daily for suppot    I will know I've met my goal when I don't cry every time I am alone.      Objective #A (Patient Action)    Patient will increase understanding of steps in the grief process.  Status: Continued - Date(s): 11/22/2022    Intervention(s)  Therapist will assign homework : go to [employer] with neighbor friend.    Objective #B  Patient will increase understanding of steps in the grief process.  Status: Continued - Date(s): 11/22/2022    Intervention(s)  Therapist will teach emotional regulation skills. asisting patient to create, collect memories about .    Objective #C  Patient will identify 3 strategies to more effectively address stressors.  Status: Continued - Date(s): 11/22/2022    Intervention(s)  Therapist will Continue to assist patient with looking for open grief suppot near her home.     Patient has reviewed and agreed to the above plan  .    JOAQUIM Newell  November 22nd 2022

## 2022-11-24 NOTE — ASSESSMENT & PLAN NOTE
Mild today, has improved over the last 2-3 days and is only 1-week-old.  Follow, consider injection if not improving.   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

## 2022-12-16 DIAGNOSIS — I10 ESSENTIAL HYPERTENSION: ICD-10-CM

## 2022-12-18 NOTE — TELEPHONE ENCOUNTER
"Routing refill request to provider for review/approval because:  A break in medication    Last Written Prescription Date:  8/12/2021  Last Fill Quantity: 90,  # refills: 3   Last office visit provider:  8/4/2022     Requested Prescriptions   Pending Prescriptions Disp Refills     amLODIPine (NORVASC) 10 MG tablet [Pharmacy Med Name: amLODIPine Besylate 10 MG Oral Tablet] 90 tablet 0     Sig: Take 1 tablet by mouth once daily       Calcium Channel Blockers Protocol  Passed - 12/18/2022  7:25 AM        Passed - Blood pressure under 140/90 in past 12 months     BP Readings from Last 3 Encounters:   08/04/22 133/73   06/21/22 130/60   05/04/22 126/68                 Passed - Recent (12 mo) or future (30 days) visit within the authorizing provider's specialty     Patient has had an office visit with the authorizing provider or a provider within the authorizing providers department within the previous 12 mos or has a future within next 30 days. See \"Patient Info\" tab in inbasket, or \"Choose Columns\" in Meds & Orders section of the refill encounter.              Passed - Medication is active on med list        Passed - Patient is age 18 or older        Passed - No active pregnancy on record        Passed - Normal serum creatinine on file in past 12 months     Recent Labs   Lab Test 02/01/22  1056   CR 0.85       Ok to refill medication if creatinine is low          Passed - No positive pregnancy test in past 12 months             Faith Piña RN 12/18/22 7:26 AM  "

## 2022-12-20 RX ORDER — AMLODIPINE BESYLATE 10 MG/1
TABLET ORAL
Qty: 90 TABLET | Refills: 0 | Status: SHIPPED | OUTPATIENT
Start: 2022-12-20 | End: 2023-03-20

## 2023-01-24 ENCOUNTER — VIRTUAL VISIT (OUTPATIENT)
Dept: PSYCHOLOGY | Facility: CLINIC | Age: 88
End: 2023-01-24
Payer: COMMERCIAL

## 2023-01-24 DIAGNOSIS — F33.41 RECURRENT MAJOR DEPRESSIVE DISORDER, IN PARTIAL REMISSION (H): Primary | ICD-10-CM

## 2023-01-24 PROCEDURE — 90834 PSYTX W PT 45 MINUTES: CPT | Mod: 95 | Performed by: SOCIAL WORKER

## 2023-01-24 ASSESSMENT — PATIENT HEALTH QUESTIONNAIRE - PHQ9
SUM OF ALL RESPONSES TO PHQ QUESTIONS 1-9: 0
5. POOR APPETITE OR OVEREATING: NOT AT ALL

## 2023-01-24 ASSESSMENT — ANXIETY QUESTIONNAIRES
GAD7 TOTAL SCORE: 0
2. NOT BEING ABLE TO STOP OR CONTROL WORRYING: NOT AT ALL
1. FEELING NERVOUS, ANXIOUS, OR ON EDGE: NOT AT ALL
6. BECOMING EASILY ANNOYED OR IRRITABLE: NOT AT ALL
5. BEING SO RESTLESS THAT IT IS HARD TO SIT STILL: NOT AT ALL
GAD7 TOTAL SCORE: 0
3. WORRYING TOO MUCH ABOUT DIFFERENT THINGS: NOT AT ALL
7. FEELING AFRAID AS IF SOMETHING AWFUL MIGHT HAPPEN: NOT AT ALL
IF YOU CHECKED OFF ANY PROBLEMS ON THIS QUESTIONNAIRE, HOW DIFFICULT HAVE THESE PROBLEMS MADE IT FOR YOU TO DO YOUR WORK, TAKE CARE OF THINGS AT HOME, OR GET ALONG WITH OTHER PEOPLE: NOT DIFFICULT AT ALL

## 2023-01-24 ASSESSMENT — COLUMBIA-SUICIDE SEVERITY RATING SCALE - C-SSRS
TOTAL  NUMBER OF INTERRUPTED ATTEMPTS SINCE LAST CONTACT: NO
6. HAVE YOU EVER DONE ANYTHING, STARTED TO DO ANYTHING, OR PREPARED TO DO ANYTHING TO END YOUR LIFE?: NO
1. SINCE LAST CONTACT, HAVE YOU WISHED YOU WERE DEAD OR WISHED YOU COULD GO TO SLEEP AND NOT WAKE UP?: NO
SUICIDE, SINCE LAST CONTACT: NO
ATTEMPT SINCE LAST CONTACT: NO
2. HAVE YOU ACTUALLY HAD ANY THOUGHTS OF KILLING YOURSELF?: NO
TOTAL  NUMBER OF ABORTED OR SELF INTERRUPTED ATTEMPTS SINCE LAST CONTACT: NO

## 2023-01-24 NOTE — PROGRESS NOTES
"    [facility]                                     Progress Note    Patient Name: Laine Sharma  Date: 1/24/2023         Service Type: Individual      Session Start Time:9:01  Session End Time:9:46     Session Length: 45    Session #:38    Attendees: Client    Service Modality:  Phone Visit:      Provider verified identity through the following two step process.  Patient provided:  Patient is known previously to provider    The patient has been notified of the following:      \"We have found that certain health care needs can be provided without the need for a face to face visit.  This service lets us provide the care you need with a phone conversation.       I will have full access to your [facility] medical record during this entire phone call.   I will be taking notes for your medical record.      Since this is like an office visit, we will bill your insurance company for this service.       There are potential benefits and risks of telephone visits (e.g. limits to patient confidentiality) that differ from in-person visits.?Confidentiality still applies for telephone services, and nobody will record the visit.  It is important to be in a quiet, private space that is free of distractions (including cell phone or other devices) during the visit.??      If during the course of the call I believe a telephone visit is not appropriate, you will not be charged for this service\"     Consent has been obtained for this service by care team member: Yes     DATA  Interactive Complexity: No  Crisis: No      Progress Since Last Session (Related to Symptoms / Goals / Homework):   Symptoms: Improving : appears to be achieving her level of acceptance    Homework: Achieved / completed to satisfaction      Episode of Care Goals: Achieved / completed to satisfaction - ACTION (Actively working towards change); Intervened by reinforcing change plan / affirming steps taken-Patient is still working toward " "acceptance.     Current / Ongoing Stressors and Concerns: Patient reports she has been doing well. Family has been supportive still. Did not go see his brother in [place] due to him being in the hospital. Plans to go to see him this coming Spring. Has identical twin sister whom they never got along for over 40 years.  This sister has taken her ring that she used to have since age 9. Patient processed her feelings around this and agreed to focus on things she has control of. Notes her family has been supportive and feels that is what matters. Feels safe around her home and her cat Dina.  Goes shopping when only the weather is okay. Step sone assists with grocery shopping and other needs then she can't do it alone. Continue to get the visit from her . She also continues to support her Worship financially/Offering .   Still does not wish to have a surgery on her thumb. Apparently she was guided on this option but she feels none will take care of her. However, he family has offered to help out. Feels better with ice on the thumb. Has not seen her PCP for sometime \"because I am not sick. Only my thumb bothers me\" Continue her journey of acceptance of her 's death. Notes she feels better and knows she will never be the same.  Her next visit is in March     Treatment Objective(s) Addressed in This Session:   identify at 2 strategies to more effectively address stressors. Still is working on acceptance     Intervention:   Grief processing; problem solving; support    Assessments completed prior to visit:10/10/2020    The following assessments were completed by patient for this visit:  PHQ2:   PHQ-2 ( 1999 [employer]) 1/24/2023 11/22/2022 8/17/2022 7/8/2022 5/11/2022 5/4/2022 4/20/2022   Q1: Little interest or pleasure in doing things 0 0 0 0 0 0 0   Q2: Feeling down, depressed or hopeless 0 0 0 0 0 0 0   PHQ-2 Score 0 0 0 0 0 0 0   PHQ-2 Total Score (12-17 Years)- Positive if 3 or more points; Administer PHQ-A if " positive - - - - - - -   Q1: Little interest or pleasure in doing things - - - - - Not at all -   Q2: Feeling down, depressed or hopeless - - - - - Not at all -   PHQ-2 Score - - - - - 0 -     PHQ9:   PHQ-9 SCORE 12/1/2021 12/15/2021 2/2/2022 8/17/2022 9/19/2022 11/22/2022 1/24/2023   PHQ-9 Total Score 0 0 0 0 4 0 0     GAD7:   CHAU-7 SCORE 4/20/2022 5/11/2022 7/8/2022 8/17/2022 9/19/2022 11/22/2022 1/24/2023   Total Score 0 0 0 0 1 0 0     CAGE-AID:   CAGE-AID Total Score 3/8/2022 3/8/2022 5/11/2022 7/8/2022 8/17/2022 11/22/2022 1/24/2023   Total Score 0 0 0 0 0 0 0     PROMIS 10-Global Health (all questions and answers displayed):   PROMIS 10 1/5/2022 3/8/2022 5/11/2022 7/8/2022 8/17/2022 11/22/2022 1/24/2023   In general, would you say your health is: 3 3 3 3 3 3 3   In general, would you say your quality of life is: 3 3 3 3 3 3 3   In general, how would you rate your physical health? 3 3 3 3 3 3 3   In general, how would you rate your mental health, including your mood and your ability to think? 3 3 3 3 3 3 -   In general, how would you rate your satisfaction with your social activities and relationships? 3 3 3 3 3 3 3   In general, please rate how well you carry out your usual social activities and roles. (This includes activities at home, at work and in your community, and responsibilities as a parent, child, spouse, employee, friend, etc.) 3 3 3 3 3 3 -   To what extent are you able to carry out your everyday physical activities such as walking, climbing stairs, carrying groceries, or moving a chair? 3 3 3 3 3 2 2   In the past 7 days, how often have you been bothered by emotional problems such as feeling anxious, depressed, or irritable? 1 3 2 3 3 2 3   In the past 7 days, how would you rate your fatigue on average? 3 3 2 2 2 2 3   In the past 7 days, how would you rate your pain on average, where 0 means no pain, and 10 means worst imaginable pain? 3 5 5 4 4 3 3   Global Mental Health Score 14 12 13 12 12 13  -   Global Physical Health Score 13 12 13 13 13 13 12   PROMIS TOTAL - SUBSCORES 27 24 26 25 25 26 -   Some recent data might be hidden     [place] Suicide Severity Rating Scale (Short Version)  [place] Suicide Severity Rating (Short Version) 2/2/2022 3/8/2022 5/11/2022 7/8/2022 8/17/2022 11/22/2022 1/24/2023   Over the past 2 weeks have you felt down, depressed, or hopeless? no - - - - - -   Over the past 2 weeks have you had thoughts of killing yourself? no - - - - - -   Have you ever attempted to kill yourself? no - - - - - -   1. Wish to be Dead (Since Last Contact) - 0 0 0 0 0 0   2. Non-Specific Active Suicidal Thoughts (Since Last Contact) - 0 0 0 0 0 0   Actual Attempt (Since Last Contact) - - 0 0 0 0 0   Has subject engaged in non-suicidal self-injurious behavior? (Since Last Contact) - 0 0 0 0 0 0   Interrupted Attempts (Since Last Contact) - 0 0 0 0 0 0   Aborted or Self-Interrupted Attempt (Since Last Contact) - 0 0 0 0 0 0   Preparatory Acts or Behavior (Since Last Contact) - 0 0 0 0 0 0   Suicide (Since Last Contact) - 0 0 0 0 0 0   Calculated C-SSRS Risk Score (Since Last Contact) - No Risk Indicated No Risk Indicated No Risk Indicated No Risk Indicated No Risk Indicated No Risk Indicated      ASSESSMENT: Current Emotional / Mental Status (status of significant symptoms):   Risk status (Self / Other harm or suicidal ideation)   Patient denies current fears or concerns for personal safety.   Patient denies current or recent suicidal ideation or behaviors.   Patient denies current or recent homicidal ideation or behaviors.   Patient denies current or recent self injurious behavior or ideation.   Patient denies other safety concerns.   Patient reports there has been no change in risk factors since their last session.     Patient reports there has been no change in protective factors since their last session.     Recommended that patient call 911 or go to the local ED should there be a change in any of  these risk factors.     Appearance:                            not seen, not assessed               Eye Contact:                           Good               Psychomotor Behavior:          not assessed, not seen               Attitude:                                   Cooperative               Orientation:                             Person Place Time Situation              Speech                          Rate / Production:       Normal/ Responsive                          Volume:                       Normal               Mood:                                      Not assessed, not seen              Affect:                                      Appropriate               Thought Content:                    Clear               Thought Form:                        Coherent  Logical               Insight:                                     Good      Medication Review:   No current psychiatric medications prescribed     Medication Compliance:   NA     Allergies   Allergen Reactions     Iodine Unknown      Changes in Health Issues:   None reported     Chemical Use Review:   Substance Use: Chemical use reviewed, no active concerns identified      Tobacco Use: No current tobacco use.      Diagnosis:  No diagnosis found.  Collateral Reports Completed:   Routed note to PCP    PLAN: (Patient Tasks / Therapist Tasks / Other):   Patient talk to her friends/family members  when she feels sad for support.  Patient will keep up with her walks with her cat when weather permits to distract herself and stay in shape.  Patient will continue her reading and watch her favorite suppot in evenings to distract her mind from her loss   Patient will remain in touch with her  and other [employer] members for her spiritual support.   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 Limited mobility to get doing.     PROMIS (reviewed every 90 days): 26    Referral / Collaboration:  Referral to another professional/service is not indicated at this time..    Anticipated number of session for this episode of care: 12  Anticipation frequency of session: Biweekly  Anticipated Duration of each session: 38-52 minutes  Treatment plan will be reviewed in 90 days or when goals have been changed.     MeasurableTreatment Goal(s) related to diagnosis / functional impairment(s)  Goal 1: Patient will talk to her friend or sister daily for suppot    I will know I've met my goal when I don't cry every time I am alone.      Objective #A (Patient Action)    Patient will increase understanding of steps in the grief process.  Status: Continued - Date(s): 11/22/2022    Intervention(s)  Therapist will assign homework : go to [employer] with neighbor friend.    Objective #B  Patient will increase understanding of steps in the grief process.  Status: Continued - Date(s): 11/22/2022    Intervention(s)  Therapist will teach emotional regulation skills. asisting patient to create, collect memories about .    Objective #C  Patient will identify 3 strategies to more effectively address stressors.  Status: Continued - Date(s): 11/22/2022    Intervention(s)  Therapist will Continue to assist patient with looking for open grief suppot near her home.     Patient has reviewed and agreed to the above plan  .    FEDE Newell  November 22nd 2022

## 2023-03-20 DIAGNOSIS — I10 ESSENTIAL HYPERTENSION: ICD-10-CM

## 2023-03-20 RX ORDER — AMLODIPINE BESYLATE 10 MG/1
TABLET ORAL
Qty: 90 TABLET | Refills: 0 | Status: SHIPPED | OUTPATIENT
Start: 2023-03-20 | End: 2024-08-05

## 2023-03-20 RX ORDER — VALSARTAN 80 MG/1
TABLET ORAL
Qty: 90 TABLET | Refills: 0 | Status: SHIPPED | OUTPATIENT
Start: 2023-03-20 | End: 2023-09-07

## 2023-03-20 RX ORDER — HYDROCHLOROTHIAZIDE 25 MG/1
TABLET ORAL
Qty: 90 TABLET | Refills: 0 | Status: SHIPPED | OUTPATIENT
Start: 2023-03-20 | End: 2023-09-07

## 2023-03-20 NOTE — TELEPHONE ENCOUNTER
"Routing refill request to provider for review/approval because:  Labs not current:  Labs over a year old    Last Written Prescription Date:  1/18/2022  Last Fill Quantity: 90,  # refills: 3   Last office visit provider:  8/4/2022     Requested Prescriptions   Pending Prescriptions Disp Refills     hydrochlorothiazide (HYDRODIURIL) 25 MG tablet [Pharmacy Med Name: hydroCHLOROthiazide 25 MG Oral Tablet] 90 tablet 0     Sig: Take 1 tablet by mouth once daily       Diuretics (Including Combos) Protocol Failed - 3/20/2023 11:38 AM        Failed - Normal serum creatinine on file in past 12 months     Recent Labs   Lab Test 02/01/22  1056   CR 0.85              Failed - Normal serum potassium on file in past 12 months     Recent Labs   Lab Test 02/01/22  1056   POTASSIUM 3.5                    Failed - Normal serum sodium on file in past 12 months     Recent Labs   Lab Test 02/01/22  1056                 Passed - Blood pressure under 140/90 in past 12 months     BP Readings from Last 3 Encounters:   08/04/22 133/73   06/21/22 130/60   05/04/22 126/68                 Passed - Recent (12 mo) or future (30 days) visit within the authorizing provider's specialty     Patient has had an office visit with the authorizing provider or a provider within the authorizing providers department within the previous 12 mos or has a future within next 30 days. See \"Patient Info\" tab in inbasket, or \"Choose Columns\" in Meds & Orders section of the refill encounter.              Passed - Medication is active on med list        Passed - Patient is age 18 or older        Passed - No active pregancy on record        Passed - No positive pregnancy test in past 12 months           valsartan (DIOVAN) 80 MG tablet [Pharmacy Med Name: Valsartan 80 MG Oral Tablet] 90 tablet 0     Sig: Take 1 tablet by mouth once daily     Last Written Prescription Date:  1/25/2022  Last Fill Quantity: 30,  # refills: 3   Last office visit provider:  8/4/2022     " "Angiotensin-II Receptors Failed - 3/20/2023 11:38 AM        Failed - Normal serum creatinine on file in past 12 months     Recent Labs   Lab Test 02/01/22  1056   CR 0.85       Ok to refill medication if creatinine is low          Failed - Normal serum potassium on file in past 12 months     Recent Labs   Lab Test 02/01/22  1056   POTASSIUM 3.5                    Passed - Last blood pressure under 140/90 in past 12 months     BP Readings from Last 3 Encounters:   08/04/22 133/73   06/21/22 130/60   05/04/22 126/68                 Passed - Recent (12 mo) or future (30 days) visit within the authorizing provider's specialty     Patient has had an office visit with the authorizing provider or a provider within the authorizing providers department within the previous 12 mos or has a future within next 30 days. See \"Patient Info\" tab in inbasket, or \"Choose Columns\" in Meds & Orders section of the refill encounter.              Passed - Medication is active on med list        Passed - Patient is age 18 or older        Passed - No active pregnancy on record        Passed - No positive pregnancy test in past 12 months           amLODIPine (NORVASC) 10 MG tablet [Pharmacy Med Name: amLODIPine Besylate 10 MG Oral Tablet] 90 tablet 0     Sig: Take 1 tablet by mouth once daily     Last Written Prescription Date:  12/22/2022  Last Fill Quantity: 90,  # refills: 0   Last office visit provider:  8/4/2022     Calcium Channel Blockers Protocol  Failed - 3/20/2023 11:38 AM     Last Written Prescription Date:  12/20/2022  Last Fill Quantity: 90,  # refills: 0   Last office visit provider:  8/4/2022      Failed - Normal serum creatinine on file in past 12 months     Recent Labs   Lab Test 02/01/22  1056   CR 0.85       Ok to refill medication if creatinine is low          Passed - Blood pressure under 140/90 in past 12 months     BP Readings from Last 3 Encounters:   08/04/22 133/73   06/21/22 130/60   05/04/22 126/68                 " "Passed - Recent (12 mo) or future (30 days) visit within the authorizing provider's specialty     Patient has had an office visit with the authorizing provider or a provider within the authorizing providers department within the previous 12 mos or has a future within next 30 days. See \"Patient Info\" tab in inbasket, or \"Choose Columns\" in Meds & Orders section of the refill encounter.              Passed - Medication is active on med list        Passed - Patient is age 18 or older        Passed - No active pregnancy on record        Passed - No positive pregnancy test in past 12 months             Marisol Obrien RN 03/20/23 11:38 AM  "

## 2023-04-24 ENCOUNTER — VIRTUAL VISIT (OUTPATIENT)
Dept: PSYCHOLOGY | Facility: CLINIC | Age: 88
End: 2023-04-24
Payer: COMMERCIAL

## 2023-04-24 DIAGNOSIS — F33.41 RECURRENT MAJOR DEPRESSIVE DISORDER, IN PARTIAL REMISSION (H): Primary | ICD-10-CM

## 2023-04-24 PROCEDURE — 90834 PSYTX W PT 45 MINUTES: CPT | Mod: 95 | Performed by: SOCIAL WORKER

## 2023-04-24 ASSESSMENT — COLUMBIA-SUICIDE SEVERITY RATING SCALE - C-SSRS
SUICIDE, SINCE LAST CONTACT: NO
1. SINCE LAST CONTACT, HAVE YOU WISHED YOU WERE DEAD OR WISHED YOU COULD GO TO SLEEP AND NOT WAKE UP?: NO
ATTEMPT SINCE LAST CONTACT: NO
6. HAVE YOU EVER DONE ANYTHING, STARTED TO DO ANYTHING, OR PREPARED TO DO ANYTHING TO END YOUR LIFE?: NO
2. HAVE YOU ACTUALLY HAD ANY THOUGHTS OF KILLING YOURSELF?: NO
TOTAL  NUMBER OF INTERRUPTED ATTEMPTS SINCE LAST CONTACT: NO
TOTAL  NUMBER OF ABORTED OR SELF INTERRUPTED ATTEMPTS SINCE LAST CONTACT: NO

## 2023-04-24 ASSESSMENT — PATIENT HEALTH QUESTIONNAIRE - PHQ9
SUM OF ALL RESPONSES TO PHQ QUESTIONS 1-9: 3
5. POOR APPETITE OR OVEREATING: NOT AT ALL

## 2023-04-24 ASSESSMENT — ANXIETY QUESTIONNAIRES
GAD7 TOTAL SCORE: 1
6. BECOMING EASILY ANNOYED OR IRRITABLE: NOT AT ALL
2. NOT BEING ABLE TO STOP OR CONTROL WORRYING: NOT AT ALL
IF YOU CHECKED OFF ANY PROBLEMS ON THIS QUESTIONNAIRE, HOW DIFFICULT HAVE THESE PROBLEMS MADE IT FOR YOU TO DO YOUR WORK, TAKE CARE OF THINGS AT HOME, OR GET ALONG WITH OTHER PEOPLE: NOT DIFFICULT AT ALL
1. FEELING NERVOUS, ANXIOUS, OR ON EDGE: NOT AT ALL
7. FEELING AFRAID AS IF SOMETHING AWFUL MIGHT HAPPEN: SEVERAL DAYS
3. WORRYING TOO MUCH ABOUT DIFFERENT THINGS: NOT AT ALL
GAD7 TOTAL SCORE: 1
5. BEING SO RESTLESS THAT IT IS HARD TO SIT STILL: NOT AT ALL

## 2023-04-24 NOTE — PROGRESS NOTES
"    [facility] Counseling                                     Progress Note    Patient Name: Laine Sharma  Date: 4/24/2023         Service Type: Individual      Session Start Time:9:05  Session End Time:9:55     Session Length: 50    Session #:39    Attendees: Client    Service Modality:  Phone Visit:      Provider verified identity through the following two step process.  Patient provided:  Patient is known previously to provider    The patient has been notified of the following:      \"We have found that certain health care needs can be provided without the need for a face to face visit.  This service lets us provide the care you need with a phone conversation.       I will have full access to your [facility] medical record during this entire phone call.   I will be taking notes for your medical record.      Since this is like an office visit, we will bill your insurance company for this service.       There are potential benefits and risks of telephone visits (e.g. limits to patient confidentiality) that differ from in-person visits.?Confidentiality still applies for telephone services, and nobody will record the visit.  It is important to be in a quiet, private space that is free of distractions (including cell phone or other devices) during the visit.??      If during the course of the call I believe a telephone visit is not appropriate, you will not be charged for this service\"     Consent has been obtained for this service by care team member: Yes     DATA  Interactive Complexity: No  Crisis: No      Progress Since Last Session (Related to Symptoms / Goals / Homework):   Symptoms: Improving : appears to be achieving her level of acceptance    Homework: Achieved / completed to satisfaction      Episode of Care Goals: Achieved / completed to satisfaction - ACTION (Actively working towards change); Intervened by reinforcing change plan / affirming steps taken-Patient is still working toward " acceptance. It has been going back and forth.      Current / Ongoing Stressors and Concerns: Patient was last seen on 1/24. She was then doing pretty well and was planning to visit her brother in [place], [place]. She ended up going on 4/11 for a week. Had a good time there. Brother is pretty ill with bad diabetes but had a good time with her entire family. On her way back home, on 4/19, patient reports remembering bending to grab her new papers that fell while being on board. Then she felt chest pain. There was a doctor next to her who intervened. The doc alerted the Clue who communicate the [facility] medical team. Once off the plane she was taken to the hospital. She was stabilized and was released he next day. Step son was contacted and came to the airport to take her luggage. He has been watching her and encouraging her. He even offered to take her to her doc appt on 5/11. Patient's cat was being wathed by her neighbor who would come home to take care of her. Grateful she has a good neighbor who is there for her. She notes no issue anymore but shared her most difficult issues are her thumb which she continues to have a debate on with this writer. She fears the surgery which has been recommended. Today, she seemed to be willing to talk to her PCP again and follow their recommendations.  Has been challenged by the cost and benefits of having her surgery or not to. She also continues to work on her acceptance. Evenings continue to be the hardest time of the day.  Patient will have the next visit on 5/15.     Treatment Objective(s) Addressed in This Session:   identify at 2 strategies to more effectively address stressors. Still is working on acceptance     Intervention:   Grief processing; problem solving; support    Assessments completed prior to visit:10/10/2020    The following assessments were completed by patient for this visit:  PHQ2:       1/24/2023     9:16 AM 11/22/2022     9:30 AM 8/17/2022     9:37 AM  7/8/2022     9:26 AM 5/11/2022     9:17 AM 5/4/2022     9:38 AM 4/20/2022    10:34 AM   PHQ-2 ( 1999 [employer])   Q1: Little interest or pleasure in doing things 0 0 0 0 0 0 0   Q2: Feeling down, depressed or hopeless 0 0 0 0 0 0 0   PHQ-2 Score 0 0 0 0 0 0 0   Q1: Little interest or pleasure in doing things      Not at all    Q2: Feeling down, depressed or hopeless      Not at all    PHQ-2 Score      0      PHQ9:       12/15/2021     5:17 PM 2/2/2022     2:22 PM 8/17/2022     9:37 AM 9/19/2022     9:26 AM 11/22/2022     9:31 AM 1/24/2023     9:16 AM 4/24/2023     9:26 AM   PHQ-9 SCORE   PHQ-9 Total Score 0 0 0 4 0 0 3     GAD7:       5/11/2022     9:17 AM 7/8/2022     9:26 AM 8/17/2022     9:37 AM 9/19/2022     9:26 AM 11/22/2022     9:30 AM 1/24/2023     9:29 AM 4/24/2023     9:26 AM   CHAU-7 SCORE   Total Score 0 0 0 1 0 0 1     CAGE-AID:       3/8/2022     9:33 AM 5/11/2022     9:21 AM 7/8/2022     9:26 AM 8/17/2022     9:37 AM 11/22/2022     9:31 AM 1/24/2023     9:16 AM 4/24/2023     9:26 AM   CAGE-AID Total Score   Total Score 0 0 0 0 0 0 0     PROMIS 10-Global Health (all questions and answers displayed):       3/8/2022     9:33 AM 5/11/2022     9:21 AM 7/8/2022     9:26 AM 8/17/2022     9:37 AM 11/22/2022     9:31 AM 1/24/2023     9:16 AM 4/24/2023     9:26 AM   PROMIS 10   In general, would you say your health is: 3 3 3 3 3 3 3   In general, would you say your quality of life is: 3 3 3 3 3 3 3   In general, how would you rate your physical health? 3 3 3 3 3 3 2   In general, how would you rate your mental health, including your mood and your ability to think? 3 3 3 3 3  3   In general, how would you rate your satisfaction with your social activities and relationships? 3 3 3 3 3 3 3   In general, please rate how well you carry out your usual social activities and roles. (This includes activities at home, at work and in your community, and responsibilities as a parent, child, spouse, employee, friend, etc.) 3 3  3 3 3  2   To what extent are you able to carry out your everyday physical activities such as walking, climbing stairs, carrying groceries, or moving a chair? 3 3 3 3 2 2 2   In the past 7 days, how often have you been bothered by emotional problems such as feeling anxious, depressed, or irritable? 3 2 3 3 2 3 3   In the past 7 days, how would you rate your fatigue on average? 3 2 2 2 2 3 3   In the past 7 days, how would you rate your pain on average, where 0 means no pain, and 10 means worst imaginable pain? 5 5 4 4 3 3 4   Global Mental Health Score 12 13 12 12 13  12   Global Physical Health Score 12 13 13 13 13 12 10   PROMIS TOTAL - SUBSCORES 24 26 25 25 26  22     [place] Suicide Severity Rating Scale (Short Version)      3/8/2022     9:32 AM 5/11/2022     9:33 AM 7/8/2022     9:26 AM 8/17/2022     9:37 AM 11/22/2022     9:31 AM 1/24/2023     9:18 AM 4/24/2023     9:28 AM   [place] Suicide Severity Rating (Short Version)   1. Wish to be Dead (Since Last Contact) N N N N N N N   2. Non-Specific Active Suicidal Thoughts (Since Last Contact) N N N N N N N   Actual Attempt (Since Last Contact)  N N N N N N   Has subject engaged in non-suicidal self-injurious behavior? (Since Last Contact) N N N N N N N   Interrupted Attempts (Since Last Contact) N N N N N N N   Aborted or Self-Interrupted Attempt (Since Last Contact) N N N N N N N   Preparatory Acts or Behavior (Since Last Contact) N N N N N N N   Suicide (Since Last Contact) N N N N N N N   Calculated C-SSRS Risk Score (Since Last Contact) No Risk Indicated No Risk Indicated No Risk Indicated No Risk Indicated No Risk Indicated No Risk Indicated No Risk Indicated      ASSESSMENT: Current Emotional / Mental Status (status of significant symptoms):   Risk status (Self / Other harm or suicidal ideation)   Patient denies current fears or concerns for personal safety.   Patient denies current or recent suicidal ideation or behaviors.   Patient denies current or  recent homicidal ideation or behaviors.   Patient denies current or recent self injurious behavior or ideation.   Patient denies other safety concerns.   Patient reports there has been no change in risk factors since their last session.     Patient reports there has been no change in protective factors since their last session.     Recommended that patient call 911 or go to the local ED should there be a change in any of these risk factors.     Appearance:                            not seen, not assessed               Eye Contact:                           Good               Psychomotor Behavior:          not assessed, not seen               Attitude:                                   Cooperative               Orientation:                             Person Place Time Situation              Speech                          Rate / Production:       Normal/ Responsive                          Volume:                       Normal               Mood:                                      Not assessed, not seen              Affect:                                      Appropriate               Thought Content:                    Clear               Thought Form:                        Coherent  Logical               Insight:                                     Good      Medication Review:   No current psychiatric medications prescribed     Medication Compliance:   NA     Allergies   Allergen Reactions     Iodine Unknown      Changes in Health Issues:   None reported     Chemical Use Review:   Substance Use: Chemical use reviewed, no active concerns identified      Tobacco Use: No current tobacco use.      Diagnosis:  1. Recurrent major depressive disorder, in partial remission (H)      Collateral Reports Completed:   Routed note to PCP    PLAN: (Patient Tasks / Therapist Tasks / Other):   Patient talk to her friends/family members  when she feels sad for support.  Patient will keep up with her walks with her cat when  weather permits to distract herself and stay in shape.  Patient will continue her reading and watch her favorite suppot in evenings to distract her mind from her loss   Patient will remain in touch with her  and other [employer] members for her spiritual support.   Patient will call her best friend at least once a week  Patient's next appt in a month- if opening    Rosa Stafford, LICSW  _________________________________________________________________    Individual Treatment Plan    Patient's Name: Laine Sharma  YOB: 1933    Date of Creation:10/28/2020    Date Treatment Plan Last Reviewed/Revised:4/24/2023    DSM5 Diagnoses: Adjustment Disorders  309.0 (F43.21) With depressed mood; Grief reaction [F43.21]    Psychosocial / Contextual Factors: Grief: lost  after over 40 years of marriage. Limited mobility to get doing.     PROMIS (reviewed every 90 days): 26    Referral / Collaboration:  Referral to another professional/service is not indicated at this time..    Anticipated number of session for this episode of care: 12  Anticipation frequency of session: Biweekly; subject to change  Anticipated Duration of each session: 38-52 minutes  Treatment plan will be reviewed in 90 days or when goals have been changed.     MeasurableTreatment Goal(s) related to diagnosis / functional impairment(s)  Goal 1: Patient will talk to her friend or sister daily for suppot    I will know I've met my goal when I don't cry every time I am alone.      Objective #A (Patient Action)    Patient will increase understanding of steps in the grief process.  Status: Continued - Date(s):4/24/2023- keep this goal    Intervention(s)  Therapist will assign homework : go to [employer] with neighbor friend.    Objective #B  Patient will increase understanding of steps in the grief process.  Status: Continued - Date(s): 4/24/2023-keep thisgoal    Intervention(s)  Therapist will teach emotional regulation skills. asisting patient  to create, collect memories about .    Objective #C  Patient will identify 3 strategies to more effectively address stressors.  Status: Continued - Date(s): 4/24/2023    Intervention(s)  Therapist will Continue to assist patient with looking for open grief suppot near her home.     Patient has reviewed and agreed to the above plan  .    JOAQUIM Newell  4/24/2023

## 2023-05-11 ENCOUNTER — OFFICE VISIT (OUTPATIENT)
Dept: FAMILY MEDICINE | Facility: CLINIC | Age: 88
End: 2023-05-11
Payer: COMMERCIAL

## 2023-05-11 VITALS
DIASTOLIC BLOOD PRESSURE: 71 MMHG | BODY MASS INDEX: 31.28 KG/M2 | HEART RATE: 79 BPM | OXYGEN SATURATION: 95 % | WEIGHT: 145 LBS | RESPIRATION RATE: 16 BRPM | HEIGHT: 57 IN | SYSTOLIC BLOOD PRESSURE: 119 MMHG

## 2023-05-11 DIAGNOSIS — Z00.00 HEALTHCARE MAINTENANCE: Primary | ICD-10-CM

## 2023-05-11 DIAGNOSIS — I10 ESSENTIAL HYPERTENSION: ICD-10-CM

## 2023-05-11 DIAGNOSIS — M18.12 ARTHRITIS OF CARPOMETACARPAL (CMC) JOINT OF LEFT THUMB: ICD-10-CM

## 2023-05-11 DIAGNOSIS — Z09 HOSPITAL DISCHARGE FOLLOW-UP: ICD-10-CM

## 2023-05-11 DIAGNOSIS — R55 SYNCOPE, UNSPECIFIED SYNCOPE TYPE: ICD-10-CM

## 2023-05-11 LAB
ANION GAP SERPL CALCULATED.3IONS-SCNC: 13 MMOL/L (ref 7–15)
BUN SERPL-MCNC: 21.8 MG/DL (ref 8–23)
CALCIUM SERPL-MCNC: 9.6 MG/DL (ref 8.8–10.2)
CHLORIDE SERPL-SCNC: 101 MMOL/L (ref 98–107)
CREAT SERPL-MCNC: 0.96 MG/DL (ref 0.51–0.95)
DEPRECATED HCO3 PLAS-SCNC: 26 MMOL/L (ref 22–29)
GFR SERPL CREATININE-BSD FRML MDRD: 56 ML/MIN/1.73M2
GLUCOSE SERPL-MCNC: 104 MG/DL (ref 70–99)
POTASSIUM SERPL-SCNC: 3.3 MMOL/L (ref 3.4–5.3)
SODIUM SERPL-SCNC: 140 MMOL/L (ref 136–145)

## 2023-05-11 PROCEDURE — 36415 COLL VENOUS BLD VENIPUNCTURE: CPT | Performed by: FAMILY MEDICINE

## 2023-05-11 PROCEDURE — 0124A COVID-19 BIVALENT 12+ (PFIZER): CPT | Performed by: FAMILY MEDICINE

## 2023-05-11 PROCEDURE — 91312 COVID-19 BIVALENT 12+ (PFIZER): CPT | Performed by: FAMILY MEDICINE

## 2023-05-11 PROCEDURE — 80048 BASIC METABOLIC PNL TOTAL CA: CPT | Performed by: FAMILY MEDICINE

## 2023-05-11 PROCEDURE — 99214 OFFICE O/P EST MOD 30 MIN: CPT | Mod: 25 | Performed by: FAMILY MEDICINE

## 2023-05-11 ASSESSMENT — PAIN SCALES - GENERAL: PAINLEVEL: MILD PAIN (2)

## 2023-05-11 NOTE — PATIENT INSTRUCTIONS
Stop hydrochlorothiazide  Continue diovan/ valsartan  Check bp 1x/day-   Get me numbers in about 2 weeks

## 2023-05-11 NOTE — PROGRESS NOTES
Assessment/ Plan  1. Hospital discharge follow-up  Outlined below  MED REC REQUIRED  Post Medication Reconciliation Status: discharge medications reconciled and changed, per note/orders    2. Healthcare maintenance  COVID booster given    3. Essential hypertension  Back off on blood pressure medications, stop hydrochlorothiazide.  Follow blood pressures over the next 2 weeks.  Get back to me.  Check BMP, had hypokalemia in the hospital  - Basic metabolic panel  (Ca, Cl, CO2, Creat, Gluc, K, Na, BUN); Future  - Basic metabolic panel  (Ca, Cl, CO2, Creat, Gluc, K, Na, BUN)    4. Arthritis of carpometacarpal (CMC) joint of left thumb  Discussed with son what to buy on line for thumb, thumb spica splint    5. Syncope, unspecified syncope type  See discussion below, unclear cause, work-up if needed    Subjective  CC:  chief complaint  HPI:   genetic test admitted [facility] 4/19 through 4/20/2023 for syncopal episode.  She was visiting family on her way back on the plane when she passed out after bending down to pick something up.  Apparently had AED pad in place but no shock administered.  No reported seizure activity, and question as to whether she was completely unconscious.  Normal vital signs in the emergency room, potassium is 3.2 and indeterminant troponin of 13 which is trending down.  Blood pressure goal apparently liberalized to allow it to go up to 160s so amlodipine was stopped.  Potassium was repeated.  Recommend follow-up potassium, consider evaluation of brain with MRI EEG if recurs.    Discharge medications include hydrochlorothiazide 25, valsartan 80.  She was to stop taking amlodipine.  Also prescribed potassium chloride 20 mill equivalents 1 p.o. twice daily #60 no refills  Review of results, CT brain negative D-dimer negative, she had a positive urine culture with greater than 100,000 CFU Klebsiella, sensitive to nitrofurantoin, fluoroquinolones and TMP sulfa.  CBC was chest x-ray normal EKG  "normal    PFSH:  Patient Active Problem List   Diagnosis     Esophageal reflux     Osteoarthritis     Hyperlipidemia     Essential hypertension     Healthcare maintenance     Nodule of finger of left hand     Grief reaction     Arthritis of carpometacarpal (CMC) joint of left thumb     Anemia, unspecified type     Adjustment disorder with anxious mood     acetaminophen (TYLENOL) 325 MG tablet, [ACETAMINOPHEN (TYLENOL) 325 MG TABLET] Take 650 mg by mouth every 6 (six) hours as needed for pain.  amLODIPine (NORVASC) 10 MG tablet, Take 1 tablet by mouth once daily  escitalopram (LEXAPRO) 5 MG tablet, Take 1 tablet (5 mg) by mouth daily (Patient not taking: Reported on 5/11/2023)  hydrochlorothiazide (HYDRODIURIL) 25 MG tablet, Take 1 tablet by mouth once daily (Patient not taking: Reported on 5/11/2023)  valsartan (DIOVAN) 80 MG tablet, Take 1 tablet by mouth once daily (Patient not taking: Reported on 5/11/2023)    No current facility-administered medications on file prior to visit.       History   Smoking Status     Never   Smokeless Tobacco     Never     Social History     Social History Narrative        Lives in a townhouse alone    2022-still driving locally-all IADLs independent        Julia Guevara lives in town and would be medical decision-maker at this point.  Her son lives in [place]     Patient Care Team:  Abdulkadir Rodriguez MD as PCP - General  Abdulkadir Rodriguez MD as Assigned PCP        Objective  Physical Exam  Vitals:    05/11/23 1414   BP: 119/71   BP Location: Right arm   Patient Position: Sitting   Cuff Size: Adult Regular   Pulse: 79   Resp: 16   SpO2: 95%   Weight: 65.8 kg (145 lb)   Height: 1.44 m (4' 8.69\")     Body mass index is 31.72 kg/m .  Gen- alert, oriented/ appropriately responsive  HEENT- normal cephalic, atraumatic.   Chest- Normal inspiration and expiration.    Clear to ascultation.    No chest wall deformity or scar.  CV- Heart regular rate and rhythm  normal tones, no murmurs   No " gallops or rubs.  Ext- appear well perfused, no edema  Skin- warm and dry,   no visualized rash    Diagnostics:   None      Please note: Voice recognition software was used in this dictation.  It may therefore contain typographical errors.

## 2023-05-11 NOTE — LETTER
May 15, 2023      Laine Zachary  [address] [place] [address]        Dear ,    We are writing to inform you of your test results.    Laine, Potassium is a little low but You are stopping the medicine that is causing that, hydrochlorothiazide.  Just eat lots of fruits and vegetables and you should be fine.    Resulted Orders   Basic metabolic panel  (Ca, Cl, CO2, Creat, Gluc, K, Na, BUN)   Result Value Ref Range    Sodium 140 136 - 145 mmol/L    Potassium 3.3 (L) 3.4 - 5.3 mmol/L    Chloride 101 98 - 107 mmol/L    Carbon Dioxide (CO2) 26 22 - 29 mmol/L    Anion Gap 13 7 - 15 mmol/L    Urea Nitrogen 21.8 8.0 - 23.0 mg/dL    Creatinine 0.96 (H) 0.51 - 0.95 mg/dL    Calcium 9.6 8.8 - 10.2 mg/dL    Glucose 104 (H) 70 - 99 mg/dL    GFR Estimate 56 (L) >60 mL/min/1.73m2      Comment:      eGFR calculated using 2021 CKD-EPI equation.       If you have any questions or concerns, please call the clinic at the number listed above.       Sincerely,      Abdulkadir Rodriguez MD

## 2023-05-15 ENCOUNTER — VIRTUAL VISIT (OUTPATIENT)
Dept: PSYCHOLOGY | Facility: CLINIC | Age: 88
End: 2023-05-15
Payer: COMMERCIAL

## 2023-05-15 DIAGNOSIS — F33.41 RECURRENT MAJOR DEPRESSIVE DISORDER, IN PARTIAL REMISSION (H): Primary | ICD-10-CM

## 2023-05-15 PROCEDURE — 90834 PSYTX W PT 45 MINUTES: CPT | Mod: 95 | Performed by: SOCIAL WORKER

## 2023-05-15 NOTE — PROGRESS NOTES
"    [facility]                                     Progress Note    Patient Name: Laine Sharma  Date: 5/15/2023       Service Type: Individual      Session Start Time: 11:04  Session End Time:9:54     Session Length: 50    Session #:40    Attendees: Client    Service Modality:  Phone Visit:      Provider verified identity through the following two step process.  Patient provided:  Patient is known previously to provider    The patient has been notified of the following:      \"We have found that certain health care needs can be provided without the need for a face to face visit.  This service lets us provide the care you need with a phone conversation.       I will have full access to your [facility] medical record during this entire phone call.   I will be taking notes for your medical record.      Since this is like an office visit, we will bill your insurance company for this service.       There are potential benefits and risks of telephone visits (e.g. limits to patient confidentiality) that differ from in-person visits.?Confidentiality still applies for telephone services, and nobody will record the visit.  It is important to be in a quiet, private space that is free of distractions (including cell phone or other devices) during the visit.??      If during the course of the call I believe a telephone visit is not appropriate, you will not be charged for this service\"     Consent has been obtained for this service by care team member: Yes     DATA  Interactive Complexity: No  Crisis: No      Progress Since Last Session (Related to Symptoms / Goals / Homework):   Symptoms: Improving : appears to be achieving her level of acceptance    Homework: Achieved / completed to satisfaction      Episode of Care Goals: Achieved / completed to satisfaction - ACTION (Actively working towards change); Intervened by reinforcing change plan / affirming steps taken-Patient is still working toward " acceptance. It has been going back and forth.      Current / Ongoing Stressors and Concerns: Patient reports having been doing fairy well. Her step children contacted to wish her happy Mother's day. Shared how blessed she is having them in her life even after losing their father.  no new issues since having some incident at in the air plane coming from [place] to visit her brother.  Was able to visit her PCP and shared her story.  Reports no new issues but is concerned by a low potassium. Has been sorting out her closet items to give away to [employer]. Today she learned about a new resources to use: Epilepsy [employer]. Continues to read and has been doing to places to buy the books. Finds if hard to find cheaper books these days. Plans to spend some time with some of her step children this weekend. The thumb still is hurting but she shared she is going to leave it the way it is since it hurts but not everyday. Feels she is doing pretty good. Her next visit is in a month.     Treatment Objective(s) Addressed in This Session:   identify at 2 strategies to more effectively address stressors. Still is working on acceptance     Intervention:   Grief processing; problem solving; support    Assessments completed prior to visit:10/10/2020    The following assessments were completed by patient for this visit:  PHQ2:       1/24/2023     9:16 AM 11/22/2022     9:30 AM 8/17/2022     9:37 AM 7/8/2022     9:26 AM 5/11/2022     9:17 AM 5/4/2022     9:38 AM 4/20/2022    10:34 AM   PHQ-2 ( 1999 [employer])   Q1: Little interest or pleasure in doing things 0 0 0 0 0 0 0   Q2: Feeling down, depressed or hopeless 0 0 0 0 0 0 0   PHQ-2 Score 0 0 0 0 0 0 0   Q1: Little interest or pleasure in doing things      Not at all    Q2: Feeling down, depressed or hopeless      Not at all    PHQ-2 Score      0      PHQ9:       12/15/2021     5:17 PM 2/2/2022     2:22 PM 8/17/2022     9:37 AM 9/19/2022     9:26 AM 11/22/2022     9:31 AM 1/24/2023      9:16 AM 4/24/2023     9:26 AM   PHQ-9 SCORE   PHQ-9 Total Score 0 0 0 4 0 0 3     GAD7:       5/11/2022     9:17 AM 7/8/2022     9:26 AM 8/17/2022     9:37 AM 9/19/2022     9:26 AM 11/22/2022     9:30 AM 1/24/2023     9:29 AM 4/24/2023     9:26 AM   CHAU-7 SCORE   Total Score 0 0 0 1 0 0 1     CAGE-AID:       3/8/2022     9:33 AM 5/11/2022     9:21 AM 7/8/2022     9:26 AM 8/17/2022     9:37 AM 11/22/2022     9:31 AM 1/24/2023     9:16 AM 4/24/2023     9:26 AM   CAGE-AID Total Score   Total Score 0 0 0 0 0 0 0     PROMIS 10-Global Health (all questions and answers displayed):       3/8/2022     9:33 AM 5/11/2022     9:21 AM 7/8/2022     9:26 AM 8/17/2022     9:37 AM 11/22/2022     9:31 AM 1/24/2023     9:16 AM 4/24/2023     9:26 AM   PROMIS 10   In general, would you say your health is: 3 3 3 3 3 3 3   In general, would you say your quality of life is: 3 3 3 3 3 3 3   In general, how would you rate your physical health? 3 3 3 3 3 3 2   In general, how would you rate your mental health, including your mood and your ability to think? 3 3 3 3 3  3   In general, how would you rate your satisfaction with your social activities and relationships? 3 3 3 3 3 3 3   In general, please rate how well you carry out your usual social activities and roles. (This includes activities at home, at work and in your community, and responsibilities as a parent, child, spouse, employee, friend, etc.) 3 3 3 3 3  2   To what extent are you able to carry out your everyday physical activities such as walking, climbing stairs, carrying groceries, or moving a chair? 3 3 3 3 2 2 2   In the past 7 days, how often have you been bothered by emotional problems such as feeling anxious, depressed, or irritable? 3 2 3 3 2 3 3   In the past 7 days, how would you rate your fatigue on average? 3 2 2 2 2 3 3   In the past 7 days, how would you rate your pain on average, where 0 means no pain, and 10 means worst imaginable pain? 5 5 4 4 3 3 4   Global Mental  Health Score 12 13 12 12 13  12   Global Physical Health Score 12 13 13 13 13 12 10   PROMIS TOTAL - SUBSCORES 24 26 25 25 26  22     [place] Suicide Severity Rating Scale (Short Version)      3/8/2022     9:32 AM 5/11/2022     9:33 AM 7/8/2022     9:26 AM 8/17/2022     9:37 AM 11/22/2022     9:31 AM 1/24/2023     9:18 AM 4/24/2023     9:28 AM   [place] Suicide Severity Rating (Short Version)   1. Wish to be Dead (Since Last Contact) N N N N N N N   2. Non-Specific Active Suicidal Thoughts (Since Last Contact) N N N N N N N   Actual Attempt (Since Last Contact)  N N N N N N   Has subject engaged in non-suicidal self-injurious behavior? (Since Last Contact) N N N N N N N   Interrupted Attempts (Since Last Contact) N N N N N N N   Aborted or Self-Interrupted Attempt (Since Last Contact) N N N N N N N   Preparatory Acts or Behavior (Since Last Contact) N N N N N N N   Suicide (Since Last Contact) N N N N N N N   Calculated C-SSRS Risk Score (Since Last Contact) No Risk Indicated No Risk Indicated No Risk Indicated No Risk Indicated No Risk Indicated No Risk Indicated No Risk Indicated      ASSESSMENT: Current Emotional / Mental Status (status of significant symptoms):   Risk status (Self / Other harm or suicidal ideation)   Patient denies current fears or concerns for personal safety.   Patient denies current or recent suicidal ideation or behaviors.   Patient denies current or recent homicidal ideation or behaviors.   Patient denies current or recent self injurious behavior or ideation.   Patient denies other safety concerns.   Patient reports there has been no change in risk factors since their last session.     Patient reports there has been no change in protective factors since their last session.     Recommended that patient call 911 or go to the local ED should there be a change in any of these risk factors.     Appearance:                            not seen, not assessed               Eye Contact:                            Good               Psychomotor Behavior:          not assessed, not seen               Attitude:                                   Cooperative               Orientation:                             Person Place Time Situation              Speech                          Rate / Production:       Normal/ Responsive                          Volume:                       Normal               Mood:                                      Not assessed, not seen              Affect:                                      Appropriate               Thought Content:                    Clear               Thought Form:                        Coherent  Logical               Insight:                                     Good      Medication Review:   No current psychiatric medications prescribed     Medication Compliance:   NA     Allergies   Allergen Reactions     Iodine Unknown      Changes in Health Issues:   None reported     Chemical Use Review:   Substance Use: Chemical use reviewed, no active concerns identified      Tobacco Use: No current tobacco use.      Diagnosis:  1. Recurrent major depressive disorder, in partial remission (H)      Collateral Reports Completed:   Routed note to PCP    PLAN: (Patient Tasks / Therapist Tasks / Other): .  Patient will continue her reading and watch her favorite suppot in evenings to distract her mind from her loss   Patient will remain in touch with her family for support.   Patient will call her best friend at least once a week  Patient's next appt in a month    JOAQUIM Newell  _________________________________________________________________    Individual Treatment Plan    Patient's Name: Laine Sharma  YOB: 1933    Date of Creation:10/28/2020    Date Treatment Plan Last Reviewed/Revised:4/24/2023    DSM5 Diagnoses: Adjustment Disorders  309.0 (F43.21) With depressed mood; Grief reaction [F43.21]    Psychosocial / Contextual Factors: Grief: lost   after over 40 years of marriage. Limited mobility to get doing.     PROMIS (reviewed every 90 days): 26    Referral / Collaboration:  Referral to another professional/service is not indicated at this time..    Anticipated number of session for this episode of care: 12  Anticipation frequency of session: Biweekly; subject to change  Anticipated Duration of each session: 38-52 minutes  Treatment plan will be reviewed in 90 days or when goals have been changed.     MeasurableTreatment Goal(s) related to diagnosis / functional impairment(s)  Goal 1: Patient will talk to her friend or sister daily for suppot    I will know I've met my goal when I don't cry every time I am alone.      Objective #A (Patient Action)    Patient will increase understanding of steps in the grief process.  Status: Continued - Date(s):4/24/2023- keep this goal    Intervention(s)  Therapist will assign homework : go to [employer] with neighbor friend.    Objective #B  Patient will increase understanding of steps in the grief process.  Status: Continued - Date(s): 4/24/2023-keep thisgoal    Intervention(s)  Therapist will teach emotional regulation skills. asisting patient to create, collect memories about .    Objective #C  Patient will identify 3 strategies to more effectively address stressors.  Status: Continued - Date(s): 4/24/2023    Intervention(s)  Therapist will Continue to assist patient with looking for open grief suppot near her home.     Patient has reviewed and agreed to the above plan  .    JOAQUIM Newell  4/24/2023

## 2023-07-06 ENCOUNTER — VIRTUAL VISIT (OUTPATIENT)
Dept: PSYCHOLOGY | Facility: CLINIC | Age: 88
End: 2023-07-06
Payer: COMMERCIAL

## 2023-07-06 DIAGNOSIS — F33.41 RECURRENT MAJOR DEPRESSIVE DISORDER, IN PARTIAL REMISSION (H): Primary | ICD-10-CM

## 2023-07-06 PROCEDURE — 90834 PSYTX W PT 45 MINUTES: CPT | Mod: 95 | Performed by: SOCIAL WORKER

## 2023-07-06 ASSESSMENT — ANXIETY QUESTIONNAIRES
2. NOT BEING ABLE TO STOP OR CONTROL WORRYING: NOT AT ALL
7. FEELING AFRAID AS IF SOMETHING AWFUL MIGHT HAPPEN: NOT AT ALL
5. BEING SO RESTLESS THAT IT IS HARD TO SIT STILL: NOT AT ALL
GAD7 TOTAL SCORE: 0
GAD7 TOTAL SCORE: 0
3. WORRYING TOO MUCH ABOUT DIFFERENT THINGS: NOT AT ALL
IF YOU CHECKED OFF ANY PROBLEMS ON THIS QUESTIONNAIRE, HOW DIFFICULT HAVE THESE PROBLEMS MADE IT FOR YOU TO DO YOUR WORK, TAKE CARE OF THINGS AT HOME, OR GET ALONG WITH OTHER PEOPLE: NOT DIFFICULT AT ALL
1. FEELING NERVOUS, ANXIOUS, OR ON EDGE: NOT AT ALL
6. BECOMING EASILY ANNOYED OR IRRITABLE: NOT AT ALL

## 2023-07-06 ASSESSMENT — COLUMBIA-SUICIDE SEVERITY RATING SCALE - C-SSRS
6. HAVE YOU EVER DONE ANYTHING, STARTED TO DO ANYTHING, OR PREPARED TO DO ANYTHING TO END YOUR LIFE?: NO
1. SINCE LAST CONTACT, HAVE YOU WISHED YOU WERE DEAD OR WISHED YOU COULD GO TO SLEEP AND NOT WAKE UP?: NO
TOTAL  NUMBER OF ABORTED OR SELF INTERRUPTED ATTEMPTS SINCE LAST CONTACT: NO
2. HAVE YOU ACTUALLY HAD ANY THOUGHTS OF KILLING YOURSELF?: NO
SUICIDE, SINCE LAST CONTACT: NO
TOTAL  NUMBER OF INTERRUPTED ATTEMPTS SINCE LAST CONTACT: NO
ATTEMPT SINCE LAST CONTACT: NO

## 2023-07-06 ASSESSMENT — PATIENT HEALTH QUESTIONNAIRE - PHQ9
5. POOR APPETITE OR OVEREATING: NOT AT ALL
SUM OF ALL RESPONSES TO PHQ QUESTIONS 1-9: 0

## 2023-07-06 NOTE — PROGRESS NOTES
"    [facility]                                     Progress Note    Patient Name: Laine Sharma  Date: 7/06/2023       Service Type: Individual      Session Start Time: 9:08 Session End Time:9:52     Session Length: 44    Session #:41    Attendees: Client    Service Modality:  Phone Visit:      Provider verified identity through the following two step process.  Patient provided:  Patient is known previously to provider    The patient has been notified of the following:      \"We have found that certain health care needs can be provided without the need for a face to face visit.  This service lets us provide the care you need with a phone conversation.       I will have full access to your [facility] medical record during this entire phone call.   I will be taking notes for your medical record.      Since this is like an office visit, we will bill your insurance company for this service.       There are potential benefits and risks of telephone visits (e.g. limits to patient confidentiality) that differ from in-person visits.?Confidentiality still applies for telephone services, and nobody will record the visit.  It is important to be in a quiet, private space that is free of distractions (including cell phone or other devices) during the visit.??      If during the course of the call I believe a telephone visit is not appropriate, you will not be charged for this service\"     Consent has been obtained for this service by care team member: Yes     DATA  Interactive Complexity: No  Crisis: No      Progress Since Last Session (Related to Symptoms / Goals / Homework):   Symptoms: Improving : appears to be achieving her level of acceptance    Homework: Achieved / completed to satisfaction      Episode of Care Goals: Achieved / completed to satisfaction - ACTION (Actively working towards change); Intervened by reinforcing change plan / affirming steps taken-Patient is still working toward " acceptance. It has been going back and forth.      Current / Ongoing Stressors and Concerns: Patient turned 89 today. Very grateful to God and family as well as friends those in [place] have taken her to her favorite restaurant, [employer]. This was a restaurant she used to go to with her . Feels sad he is not there anymore. Though agreed he left her good memories she is able to share with those around her. Will remind herself to focus on those good memories and cherish what is to remind her the > 40 years together.  No other concern expressed today. Has made up her mind, no surgery on her thumb. Bother's toes were emputed due to diabetes. She will travel to [facility] to visit him in November. Will keep her monthly visit. Feels she is stable and might discharged at the next visit.      Treatment Objective(s) Addressed in This Session:   identify at 2 strategies to more effectively address stressors. Still is working on acceptance     Intervention:   Grief processing; problem solving; support    Assessments completed prior to visit:10/10/2020    The following assessments were completed by patient for this visit:  PHQ2:       1/24/2023     9:16 AM 11/22/2022     9:30 AM 8/17/2022     9:37 AM 7/8/2022     9:26 AM 5/11/2022     9:17 AM 5/4/2022     9:38 AM 4/20/2022    10:34 AM   PHQ-2 ( 1999 Pf[employer])   Q1: Little interest or pleasure in doing things 0 0 0 0 0 0 0   Q2: Feeling down, depressed or hopeless 0 0 0 0 0 0 0   PHQ-2 Score 0 0 0 0 0 0 0   Q1: Little interest or pleasure in doing things      Not at all    Q2: Feeling down, depressed or hopeless      Not at all    PHQ-2 Score      0      PHQ9:       2/2/2022     2:22 PM 8/17/2022     9:37 AM 9/19/2022     9:26 AM 11/22/2022     9:31 AM 1/24/2023     9:16 AM 4/24/2023     9:26 AM 7/6/2023     9:52 AM   PHQ-9 SCORE   PHQ-9 Total Score 0 0 4 0 0 3 0     GAD7:       7/8/2022     9:26 AM 8/17/2022     9:37 AM 9/19/2022     9:26 AM 11/22/2022     9:30 AM 1/24/2023      9:29 AM 4/24/2023     9:26 AM 7/6/2023     9:52 AM   CHAU-7 SCORE   Total Score 0 0 1 0 0 1 0     CAGE-AID:       5/11/2022     9:21 AM 7/8/2022     9:26 AM 8/17/2022     9:37 AM 11/22/2022     9:31 AM 1/24/2023     9:16 AM 4/24/2023     9:26 AM 7/6/2023     9:52 AM   CAGE-AID Total Score   Total Score 0 0 0 0 0 0 0     PROMIS 10-Global Health (all questions and answers displayed):       5/11/2022     9:21 AM 7/8/2022     9:26 AM 8/17/2022     9:37 AM 11/22/2022     9:31 AM 1/24/2023     9:16 AM 4/24/2023     9:26 AM 7/6/2023     9:52 AM   PROMIS 10   In general, would you say your health is: 3 3 3 3 3 3 3   In general, would you say your quality of life is: 3 3 3 3 3 3 3   In general, how would you rate your physical health? 3 3 3 3 3 2 3   In general, how would you rate your mental health, including your mood and your ability to think? 3 3 3 3  3 4   In general, how would you rate your satisfaction with your social activities and relationships? 3 3 3 3 3 3 3   In general, please rate how well you carry out your usual social activities and roles. (This includes activities at home, at work and in your community, and responsibilities as a parent, child, spouse, employee, friend, etc.) 3 3 3 3  2 3   To what extent are you able to carry out your everyday physical activities such as walking, climbing stairs, carrying groceries, or moving a chair? 3 3 3 2 2 2 3   In the past 7 days, how often have you been bothered by emotional problems such as feeling anxious, depressed, or irritable? 2 3 3 2 3 3 2   In the past 7 days, how would you rate your fatigue on average? 2 2 2 2 3 3 2   In the past 7 days, how would you rate your pain on average, where 0 means no pain, and 10 means worst imaginable pain? 5 4 4 3 3 4 1   Global Mental Health Score 13 12 12 13  12 14   Global Physical Health Score 13 13 13 13 12 10 14   PROMIS TOTAL - SUBSCORES 26 25 25 26  22 28     [place] Suicide Severity Rating Scale (Short Version)       5/11/2022     9:33 AM 7/8/2022     9:26 AM 8/17/2022     9:37 AM 11/22/2022     9:31 AM 1/24/2023     9:18 AM 4/24/2023     9:28 AM 7/6/2023     9:52 AM   [place] Suicide Severity Rating (Short Version)   1. Wish to be Dead (Since Last Contact) N N N N N N N   2. Non-Specific Active Suicidal Thoughts (Since Last Contact) N N N N N N N   Actual Attempt (Since Last Contact) N N N N N N N   Has subject engaged in non-suicidal self-injurious behavior? (Since Last Contact) N N N N N N N   Interrupted Attempts (Since Last Contact) N N N N N N N   Aborted or Self-Interrupted Attempt (Since Last Contact) N N N N N N N   Preparatory Acts or Behavior (Since Last Contact) N N N N N N N   Suicide (Since Last Contact) N N N N N N N   Calculated C-SSRS Risk Score (Since Last Contact) No Risk Indicated No Risk Indicated No Risk Indicated No Risk Indicated No Risk Indicated No Risk Indicated No Risk Indicated      ASSESSMENT: Current Emotional / Mental Status (status of significant symptoms):   Risk status (Self / Other harm or suicidal ideation)   Patient denies current fears or concerns for personal safety.   Patient denies current or recent suicidal ideation or behaviors.   Patient denies current or recent homicidal ideation or behaviors.   Patient denies current or recent self injurious behavior or ideation.   Patient denies other safety concerns.   Patient reports there has been no change in risk factors since their last session.     Patient reports there has been no change in protective factors since their last session.     Recommended that patient call 911 or go to the local ED should there be a change in any of these risk factors.     Appearance:                            not seen, not assessed               Eye Contact:                           Good               Psychomotor Behavior:          not assessed, not seen               Attitude:                                   Cooperative               Orientation:                              Person Place Time Situation              Speech                          Rate / Production:       Normal/ Responsive                          Volume:                       Normal               Mood:                                      Not assessed, not seen              Affect:                                      Appropriate               Thought Content:                    Clear               Thought Form:                        Coherent  Logical               Insight:                                     Good      Medication Review:   No current psychiatric medications prescribed     Medication Compliance:   NA     Allergies   Allergen Reactions     Iodine Unknown      Changes in Health Issues:   None reported     Chemical Use Review:   Substance Use: Chemical use reviewed, no active concerns identified      Tobacco Use: No current tobacco use.      Diagnosis:  1. Recurrent major depressive disorder, in partial remission (H)      Collateral Reports Completed:   Routed note to PCP    PLAN: (Patient Tasks / Therapist Tasks / Other): .   Patient will remain in touch with her family for support.   Patient will call her best friend at least once a week  Patient will keep up with her reading to distract her mind  Patient's next appt in a month    JOAQUIM Newell  _________________________________________________________________    Individual Treatment Plan    Patient's Name: Laine Sharma  YOB: 1933    Date of Creation:10/28/2020    Date Treatment Plan Last Reviewed/Revised:4/24/2023    DSM5 Diagnoses: Adjustment Disorders  309.0 (F43.21) With depressed mood; Grief reaction [F43.21]    Psychosocial / Contextual Factors: Grief: lost  after over 40 years of marriage. Limited mobility to get doing.     PROMIS (reviewed every 90 days): 26    Referral / Collaboration:  Referral to another professional/service is not indicated at this time..    Anticipated number of session for  this episode of care: 12  Anticipation frequency of session: Biweekly; subject to change  Anticipated Duration of each session: 38-52 minutes  Treatment plan will be reviewed in 90 days or when goals have been changed.     MeasurableTreatment Goal(s) related to diagnosis / functional impairment(s)  Goal 1: Patient will talk to her friend or sister daily for suppot    I will know I've met my goal when I don't cry every time I am alone.      Objective #A (Patient Action)    Patient will increase understanding of steps in the grief process.  Status: Continued - Date(s):4/24/2023- keep this goal    Intervention(s)  Therapist will assign homework : go to [employer] with neighbor friend.    Objective #B  Patient will increase understanding of steps in the grief process.  Status: Continued - Date(s): 4/24/2023-keep thisgoal    Intervention(s)  Therapist will teach emotional regulation skills. asisting patient to create, collect memories about .    Objective #C  Patient will identify 3 strategies to more effectively address stressors.  Status: Continued - Date(s): 4/24/2023    Intervention(s)  Therapist will Continue to assist patient with looking for open grief suppot near her home.     Patient has reviewed and agreed to the above plan  .    JOAQUIM Newell  4/24/2023

## 2023-07-10 ENCOUNTER — TELEPHONE (OUTPATIENT)
Dept: FAMILY MEDICINE | Facility: CLINIC | Age: 88
End: 2023-07-10
Payer: COMMERCIAL

## 2023-07-10 NOTE — TELEPHONE ENCOUNTER
Reason for Call:  Appointment Request    Patient requesting this type of appt:  Hospital/ED Follow-Up     Requested provider: Abdulkadir Rodriguez    Reason patient unable to be scheduled: Not within requested timeframe    When does patient want to be seen/preferred time: 3-7 days    Comments: Patient states they have a choking/cough episode while eating and was told to follow up with provider regarding the possible cause of the episode. Patient currently scheduled with PCPs soonest available.     Okay to leave a detailed message?: Yes at Cell number on file:    Telephone Information:   Mobile [phone]       Call taken on 7/10/2023 at 2:43 PM by Brant Thakur

## 2023-07-13 ENCOUNTER — OFFICE VISIT (OUTPATIENT)
Dept: FAMILY MEDICINE | Facility: CLINIC | Age: 88
End: 2023-07-13
Payer: COMMERCIAL

## 2023-07-13 VITALS
RESPIRATION RATE: 24 BRPM | SYSTOLIC BLOOD PRESSURE: 143 MMHG | DIASTOLIC BLOOD PRESSURE: 74 MMHG | OXYGEN SATURATION: 97 % | WEIGHT: 144.75 LBS | TEMPERATURE: 98.1 F | BODY MASS INDEX: 31.23 KG/M2 | HEIGHT: 57 IN | HEART RATE: 65 BPM

## 2023-07-13 DIAGNOSIS — J20.9 ACUTE BRONCHITIS, UNSPECIFIED ORGANISM: Primary | ICD-10-CM

## 2023-07-13 PROCEDURE — 99214 OFFICE O/P EST MOD 30 MIN: CPT | Performed by: FAMILY MEDICINE

## 2023-07-13 RX ORDER — DOXYCYCLINE 100 MG/1
100 CAPSULE ORAL 2 TIMES DAILY
Qty: 20 CAPSULE | Refills: 0 | Status: SHIPPED | OUTPATIENT
Start: 2023-07-13

## 2023-07-13 NOTE — PROGRESS NOTES
Assessment/ Plan  1. Acute bronchitis, unspecified organism  Despite negative chest x-ray and evaluation in the emergency room yesterday, given 2 weeks of productive cough, will treat with doxycycline  - doxycycline hyclate (VIBRAMYCIN) 100 MG capsule; Take 1 capsule (100 mg) by mouth 2 times daily  Dispense: 20 capsule; Refill: 0         Body mass index is 30.8 kg/m .    Subjective  CC:  chief complaint  HPI:  89-year-old  Seen in the emergency room yesterday at [facility] for shortness of breath cough and chest injury.    Had a choking episode about 2 weeks ago in a restaurant.  On [place] performed Heimlich maneuver to get piece of salad out.  Paramedics came and recommended that she go to the hospital.  She declined.  She had some shortness of breath that night and it continued.  Eventually developed purulent cough and had some blood in it.  Went to the emergency room yesterday with shortness of breath  Denies any fever or chills.    There she was examined, no noted abnormalities.  White blood cell count was 5.2, hemoglobin normal, BNP normal, troponins were borderline at 13 and 12.  BMP was normal except glucose of 110.  EKG normal  Chest x-ray was normal    Patient Active Problem List   Diagnosis     Esophageal reflux     Osteoarthritis     Hyperlipidemia     Essential hypertension     Healthcare maintenance     Nodule of finger of left hand     Grief reaction     Arthritis of carpometacarpal (CMC) joint of left thumb     Anemia, unspecified type     Adjustment disorder with anxious mood     Current medications reviewed as follows:  acetaminophen (TYLENOL) 325 MG tablet, [ACETAMINOPHEN (TYLENOL) 325 MG TABLET] Take 650 mg by mouth every 6 (six) hours as needed for pain.  amLODIPine (NORVASC) 10 MG tablet, Take 1 tablet by mouth once daily  valsartan (DIOVAN) 80 MG tablet, Take 1 tablet by mouth once daily  escitalopram (LEXAPRO) 5 MG tablet, Take 1 tablet (5 mg) by mouth daily (Patient not taking: Reported on  "5/11/2023)  hydrochlorothiazide (HYDRODIURIL) 25 MG tablet, Take 1 tablet by mouth once daily (Patient not taking: Reported on 5/11/2023)    No current facility-administered medications on file prior to visit.     History   Smoking Status     Never   Smokeless Tobacco     Never     Social History     Social History Narrative        Lives in a townhouse alone    2022-still driving locally-all IADLs independent        Julia Guevara lives in town and would be medical decision-maker at this point.  Her son lives in [place]     Patient Care Team:  Abdulkadir Rodriguez MD as PCP - General  Abdulkadir Rodriguez MD as Assigned PCP      Objective  Physical Exam  Vitals:    07/13/23 1332 07/13/23 1339   BP: (!) 144/82 (!) 143/74   BP Location: Right arm    Patient Position: Sitting    Cuff Size: Adult Regular    Pulse: 72 65   Resp: 24    Temp: 98.1  F (36.7  C)    SpO2: 97%    Weight: 65.7 kg (144 lb 12 oz)    Height: 1.46 m (4' 9.48\")      Gen- alert, oriented/ appropriately responsive  HEENT- normal cephalic, atraumatic.   Chest- Normal inspiration and expiration.   Fine rales heard throughout the chest, worse on the right side than the left.  No wheezing   No chest wall deformity or scar.  CV- Heart regular rate and rhythm  normal tones, no murmurs   No gallops or rubs.  Ext- appear well perfused, no edema  Skin- warm and dry,   no visualized rash    Diagnostics  None    Please note: Voice recognition software was used in this dictation.  It may therefore contain typographical errors.    Answers for HPI/ROS submitted by the patient on 7/13/2023  What is the reason for your visit today? : Choking episode      "

## 2023-08-09 ENCOUNTER — VIRTUAL VISIT (OUTPATIENT)
Dept: PSYCHOLOGY | Facility: CLINIC | Age: 88
End: 2023-08-09
Payer: COMMERCIAL

## 2023-08-09 DIAGNOSIS — F33.41 RECURRENT MAJOR DEPRESSIVE DISORDER, IN PARTIAL REMISSION (H): Primary | ICD-10-CM

## 2023-08-09 PROCEDURE — 90834 PSYTX W PT 45 MINUTES: CPT | Mod: 95 | Performed by: SOCIAL WORKER

## 2023-08-09 ASSESSMENT — COLUMBIA-SUICIDE SEVERITY RATING SCALE - C-SSRS
SUICIDE, SINCE LAST CONTACT: NO
TOTAL  NUMBER OF ABORTED OR SELF INTERRUPTED ATTEMPTS SINCE LAST CONTACT: NO
6. HAVE YOU EVER DONE ANYTHING, STARTED TO DO ANYTHING, OR PREPARED TO DO ANYTHING TO END YOUR LIFE?: NO
1. SINCE LAST CONTACT, HAVE YOU WISHED YOU WERE DEAD OR WISHED YOU COULD GO TO SLEEP AND NOT WAKE UP?: NO
ATTEMPT SINCE LAST CONTACT: NO
TOTAL  NUMBER OF INTERRUPTED ATTEMPTS SINCE LAST CONTACT: NO
2. HAVE YOU ACTUALLY HAD ANY THOUGHTS OF KILLING YOURSELF?: NO

## 2023-08-09 ASSESSMENT — ANXIETY QUESTIONNAIRES
5. BEING SO RESTLESS THAT IT IS HARD TO SIT STILL: NOT AT ALL
GAD7 TOTAL SCORE: 0
6. BECOMING EASILY ANNOYED OR IRRITABLE: NOT AT ALL
7. FEELING AFRAID AS IF SOMETHING AWFUL MIGHT HAPPEN: NOT AT ALL
3. WORRYING TOO MUCH ABOUT DIFFERENT THINGS: NOT AT ALL
IF YOU CHECKED OFF ANY PROBLEMS ON THIS QUESTIONNAIRE, HOW DIFFICULT HAVE THESE PROBLEMS MADE IT FOR YOU TO DO YOUR WORK, TAKE CARE OF THINGS AT HOME, OR GET ALONG WITH OTHER PEOPLE: NOT DIFFICULT AT ALL
2. NOT BEING ABLE TO STOP OR CONTROL WORRYING: NOT AT ALL
GAD7 TOTAL SCORE: 0
1. FEELING NERVOUS, ANXIOUS, OR ON EDGE: NOT AT ALL

## 2023-08-09 ASSESSMENT — PATIENT HEALTH QUESTIONNAIRE - PHQ9
5. POOR APPETITE OR OVEREATING: NOT AT ALL
SUM OF ALL RESPONSES TO PHQ QUESTIONS 1-9: 0

## 2023-08-09 NOTE — PROGRESS NOTES
"    [facility]                                     Progress Note    Patient Name: Laine Sharma  Date: 8/09/2023       Service Type: Individual      Session Start Time: 9:05 Session End Time:9:52     Session Length: 47    Session #:42    Attendees: Client    Service Modality:  Phone Visit:      Provider verified identity through the following two step process.  Patient provided:  Patient is known previously to provider    The patient has been notified of the following:      \"We have found that certain health care needs can be provided without the need for a face to face visit.  This service lets us provide the care you need with a phone conversation.       I will have full access to your [facility] medical record during this entire phone call.   I will be taking notes for your medical record.      Since this is like an office visit, we will bill your insurance company for this service.       There are potential benefits and risks of telephone visits (e.g. limits to patient confidentiality) that differ from in-person visits.?Confidentiality still applies for telephone services, and nobody will record the visit.  It is important to be in a quiet, private space that is free of distractions (including cell phone or other devices) during the visit.??      If during the course of the call I believe a telephone visit is not appropriate, you will not be charged for this service\"     Consent has been obtained for this service by care team member: Yes     DATA  Interactive Complexity: No  Crisis: No      Progress Since Last Session (Related to Symptoms / Goals / Homework):   Symptoms: Improving : appears to be achieving her level of acceptance    Homework: Achieved / completed to satisfaction      Episode of Care Goals: Achieved / completed to satisfaction - ACTION (Actively working towards change); Intervened by reinforcing change plan / affirming steps taken-Patient is still working toward " "acceptance. It has been going back and forth.      Current / Ongoing Stressors and Concerns:  Patient reports she she is doing well.  She has been making peace with her evenings which are the most challenging moment for her. Keeps herself busy with her reading and her cat. Stays in touch with her family.  The thumb still hurts but she has made it clear that a \"surgery is not an option at my age\" She has been careful with her driving. Only drives when there is no rush hour and no more than 2 miles away for grocery shopping. Has not gone to [employer] still but has her  visiting her.  Continues to make her own meals and cleans her our home.  Patient enjoys company with her family and shares satisfaction when it comes to how she is being cared for by her family.  Has shared she knows she is getting older and feels she worries about her brother with diabetes who lives in [place], [place]. Will continue focusing on positivity. Her next visit is in a month.     Treatment Objective(s) Addressed in This Session:   identify at 2 strategies to more effectively address stressors. Still is working on acceptance     Intervention:   Grief processing; problem solving ; support    Assessments completed prior to visit:10/10/2020    The following assessments were completed by patient for this visit:  PHQ2:       1/24/2023     9:16 AM 11/22/2022     9:30 AM 8/17/2022     9:37 AM 7/8/2022     9:26 AM 5/11/2022     9:17 AM 5/4/2022     9:38 AM 4/20/2022    10:34 AM   PHQ-2 ( 1999 [employer])   Q1: Little interest or pleasure in doing things 0 0 0 0 0 0 0   Q2: Feeling down, depressed or hopeless 0 0 0 0 0 0 0   PHQ-2 Score 0 0 0 0 0 0 0   Q1: Little interest or pleasure in doing things      Not at all    Q2: Feeling down, depressed or hopeless      Not at all    PHQ-2 Score      0      PHQ9:       8/17/2022     9:37 AM 9/19/2022     9:26 AM 11/22/2022     9:31 AM 1/24/2023     9:16 AM 4/24/2023     9:26 AM 7/6/2023     9:52 AM 8/9/2023     " 9:34 AM   PHQ-9 SCORE   PHQ-9 Total Score 0 4 0 0 3 0 0     GAD7:       8/17/2022     9:37 AM 9/19/2022     9:26 AM 11/22/2022     9:30 AM 1/24/2023     9:29 AM 4/24/2023     9:26 AM 7/6/2023     9:52 AM 8/9/2023     9:34 AM   CHAU-7 SCORE   Total Score 0 1 0 0 1 0 0     CAGE-AID:       7/8/2022     9:26 AM 8/17/2022     9:37 AM 11/22/2022     9:31 AM 1/24/2023     9:16 AM 4/24/2023     9:26 AM 7/6/2023     9:52 AM 8/9/2023     9:34 AM   CAGE-AID Total Score   Total Score 0 0 0 0 0 0 0     PROMIS 10-Global Health (all questions and answers displayed):       7/8/2022     9:26 AM 8/17/2022     9:37 AM 11/22/2022     9:31 AM 1/24/2023     9:16 AM 4/24/2023     9:26 AM 7/6/2023     9:52 AM 8/9/2023     9:34 AM   PROMIS 10   In general, would you say your health is: 3 3 3 3 3 3 3   In general, would you say your quality of life is: 3 3 3 3 3 3 3   In general, how would you rate your physical health? 3 3 3 3 2 3 3   In general, how would you rate your mental health, including your mood and your ability to think? 3 3 3  3 4 3   In general, how would you rate your satisfaction with your social activities and relationships? 3 3 3 3 3 3 3   In general, please rate how well you carry out your usual social activities and roles. (This includes activities at home, at work and in your community, and responsibilities as a parent, child, spouse, employee, friend, etc.) 3 3 3  2 3 3   To what extent are you able to carry out your everyday physical activities such as walking, climbing stairs, carrying groceries, or moving a chair? 3 3 2 2 2 3 3   In the past 7 days, how often have you been bothered by emotional problems such as feeling anxious, depressed, or irritable? 3 3 2 3 3 2 4   In the past 7 days, how would you rate your fatigue on average? 2 2 2 3 3 2 3   In the past 7 days, how would you rate your pain on average, where 0 means no pain, and 10 means worst imaginable pain? 4 4 3 3 4 1 2   Global Mental Health Score 12 12 13  12  14 11   Global Physical Health Score 13 13 13 12 10 14 13   PROMIS TOTAL - SUBSCORES 25 25 26  22 28 24     [place] Suicide Severity Rating Scale (Short Version)      7/8/2022     9:26 AM 8/17/2022     9:37 AM 11/22/2022     9:31 AM 1/24/2023     9:18 AM 4/24/2023     9:28 AM 7/6/2023     9:52 AM 8/9/2023     9:33 AM   [place] Suicide Severity Rating (Short Version)   1. Wish to be Dead (Since Last Contact) N N N N N N N   2. Non-Specific Active Suicidal Thoughts (Since Last Contact) N N N N N N N   Actual Attempt (Since Last Contact) N N N N N N N   Has subject engaged in non-suicidal self-injurious behavior? (Since Last Contact) N N N N N N N   Interrupted Attempts (Since Last Contact) N N N N N N N   Aborted or Self-Interrupted Attempt (Since Last Contact) N N N N N N N   Preparatory Acts or Behavior (Since Last Contact) N N N N N N N   Suicide (Since Last Contact) N N N N N N N   Calculated C-SSRS Risk Score (Since Last Contact) No Risk Indicated No Risk Indicated No Risk Indicated No Risk Indicated No Risk Indicated No Risk Indicated No Risk Indicated      ASSESSMENT: Current Emotional / Mental Status (status of significant symptoms):   Risk status (Self / Other harm or suicidal ideation)   Patient denies current fears or concerns for personal safety.   Patient denies current or recent suicidal ideation or behaviors.   Patient denies current or recent homicidal ideation or behaviors.   Patient denies current or recent self injurious behavior or ideation.   Patient denies other safety concerns.   Patient reports there has been no change in risk factors since their last session.     Patient reports there has been no change in protective factors since their last session.     Recommended that patient call 911 or go to the local ED should there be a change in any of these risk factors.     Appearance:                            not seen, not assessed               Eye Contact:                           Good                Psychomotor Behavior:          not assessed, not seen               Attitude:                                   Cooperative               Orientation:                             Person Place Time Situation              Speech                          Rate / Production:       Normal/ Responsive                          Volume:                       Normal               Mood:                                      Not assessed, not seen              Affect:                                      Appropriate               Thought Content:                    Clear               Thought Form:                        Coherent  Logical               Insight:                                     Good      Medication Review:   No current psychiatric medications prescribed     Medication Compliance:   NA     Allergies   Allergen Reactions    Iodine Unknown      Changes in Health Issues:   None reported     Chemical Use Review:   Substance Use: Chemical use reviewed, no active concerns identified      Tobacco Use: No current tobacco use.      Diagnosis:  1. Recurrent major depressive disorder, in partial remission (H)      Collateral Reports Completed:   Routed note to PCP    PLAN: (Patient Tasks / Therapist Tasks / Other): .   Patient will remain in touch with her family for support.   Patient will call her best friend at least once a week  Patient will keep up with her reading to distract her mind  Patient's next appt in a month    JOAQUIM Newell  _________________________________________________________________    Individual Treatment Plan    Patient's Name: Laine Sharma  YOB: 1933    Date of Creation:10/28/2020    Date Treatment Plan Last Reviewed/Revised: 8/09/2023    DSM5 Diagnoses: Adjustment Disorders  309.0 (F43.21) With depressed mood; Grief reaction [F43.21]    Psychosocial / Contextual Factors: Grief: lost  after over 40 years of marriage. Limited mobility to get doing.     [employer]  (reviewed every 90 days): 26    Referral / Collaboration:  Referral to another professional/service is not indicated at this time..    Anticipated number of session for this episode of care: 12  Anticipation frequency of session: Biweekly; subject to change  Anticipated Duration of each session: 38-52 minutes  Treatment plan will be reviewed in 90 days or when goals have been changed.     MeasurableTreatment Goal(s) related to diagnosis / functional impairment(s)  Goal 1: Patient will talk to her friend or sister daily for suppot    I will know I've met my goal when I don't cry every time I am alone.      Objective #A (Patient Action)    Patient will increase understanding of steps in the grief process.  Status: Continued - Date(s):8/09/2023- keep this goal    Intervention(s)  Therapist will assign homework : go to [employer] with neighbor friend .    Objective #B  Patient will increase understanding of steps in the grief process.  Status: Continued - Date(s): 8/09/2023-keep thisgoal    Intervention(s)  Therapist will teach emotional regulation skills. asisting patient to create, collect memories about  .    Objective #C  Patient will identify 3 strategies to more effectively address stressors.  Status: Continued - Date(s): 8/09/2023    Intervention(s)  Therapist will  Continue to assist patient with looking for open grief suppot near her home.     Patient has reviewed and agreed to the above plan  .    JOAQUIM Newell  8/09/2023

## 2023-09-07 DIAGNOSIS — I10 ESSENTIAL HYPERTENSION: ICD-10-CM

## 2023-09-07 RX ORDER — VALSARTAN 80 MG/1
TABLET ORAL
Qty: 90 TABLET | Refills: 0 | Status: SHIPPED | OUTPATIENT
Start: 2023-09-07 | End: 2023-12-12

## 2023-09-07 RX ORDER — HYDROCHLOROTHIAZIDE 25 MG/1
TABLET ORAL
Qty: 60 TABLET | Refills: 0 | Status: SHIPPED | OUTPATIENT
Start: 2023-09-07 | End: 2023-12-12

## 2023-09-07 NOTE — TELEPHONE ENCOUNTER
Patient calls back inquiring about status of refill request. Patient have 2 pills left of valsartan (DIOVAN) 80 MG tablet. Writer inform caller message was sent to provider- awaiting for response. Writer will send provider another message. Caller verbalizes understanding.     Snehal Carrillo,   [facility]  September 7, 2023 10:47 AM

## 2023-09-18 ENCOUNTER — DOCUMENTATION ONLY (OUTPATIENT)
Dept: PSYCHOLOGY | Facility: CLINIC | Age: 88
End: 2023-09-18
Payer: COMMERCIAL

## 2023-09-18 NOTE — PROGRESS NOTES
received a call from officeeileen Marion with [place] police department. Shared he has been at the patient's home and nothing out of the ordinary was noted. He was able to reach patient's  step son who stated that she was with him over the weekend and that apparently she was doing errands today.  Writer indicated that the patient had an appt at 9 am wonders if she drove that far to have been gone for this long.  Officer is aware that until patient talked to them,  is still concerned but she put her daniel on the fact the patient was with her step son  on the weekend.

## 2023-09-18 NOTE — PROGRESS NOTES
Patient was a no show today for her phone visit. This is unusual to miss her appt. Writer left her a message and the phone number to call: [phone]. Writer called again at different time: 15: 05 minutes and still did not reach her. Writer then call her emergency contact, Lynn. She indicated that she has not heard from the patient for few days.  Stated she noted patient tried to call her but when she called her back, did not reach her.  Writer then call the [place] police department for a welfare check.  Around 15:10 minutes, Writer will be updated once they have information.

## 2023-09-20 ENCOUNTER — DOCUMENTATION ONLY (OUTPATIENT)
Dept: PSYCHOLOGY | Facility: CLINIC | Age: 88
End: 2023-09-20
Payer: COMMERCIAL

## 2023-09-20 NOTE — PROGRESS NOTES
Writer was able to reach the patient today. She was visiting her relative nearby and has forgotten about her visit with W. She will be seen this Friday.

## 2023-09-25 ENCOUNTER — VIRTUAL VISIT (OUTPATIENT)
Dept: PSYCHOLOGY | Facility: CLINIC | Age: 88
End: 2023-09-25
Payer: COMMERCIAL

## 2023-09-25 DIAGNOSIS — F33.41 RECURRENT MAJOR DEPRESSIVE DISORDER, IN PARTIAL REMISSION (H): Primary | ICD-10-CM

## 2023-09-25 PROCEDURE — 90834 PSYTX W PT 45 MINUTES: CPT | Mod: 95 | Performed by: SOCIAL WORKER

## 2023-09-25 NOTE — PROGRESS NOTES
"    [facility]                                     Progress Note    Patient Name: Laine Sharma  Date: 9/25/2023       Service Type: Individual      Session Start Time: 8:02 Session End Time:8:45     Session Length: 43    Session #:43    Attendees: Client    Service Modality:  Phone Visit:      Provider verified identity through the following two step process.  Patient provided:  Patient is known previously to provider    The patient has been notified of the following:      \"We have found that certain health care needs can be provided without the need for a face to face visit.  This service lets us provide the care you need with a phone conversation.       I will have full access to your [facility] medical record during this entire phone call.   I will be taking notes for your medical record.      Since this is like an office visit, we will bill your insurance company for this service.       There are potential benefits and risks of telephone visits (e.g. limits to patient confidentiality) that differ from in-person visits.?Confidentiality still applies for telephone services, and nobody will record the visit.  It is important to be in a quiet, private space that is free of distractions (including cell phone or other devices) during the visit.??      If during the course of the call I believe a telephone visit is not appropriate, you will not be charged for this service\"     Consent has been obtained for this service by care team member: Yes     DATA  Interactive Complexity: No  Crisis: No      Progress Since Last Session (Related to Symptoms / Goals / Homework):   Symptoms: Improving : appears to be achieving her level of acceptance    Homework: Achieved / completed to satisfaction      Episode of Care Goals: Achieved / completed to satisfaction - ACTION (Actively working towards change); Intervened by reinforcing change plan / affirming steps taken-Patient is still working toward " acceptance. It has been going back and forth.      Current / Ongoing Stressors and Concerns:  Patent and writer discussed about the last week situation. She shared how she had gone to her sister  in law is very ill and stayed longer than planned. Sister in law wanted her to stay longer because she has no support. Patient expressed gratitude that at least someone can check on her which sister in law does not get.  Patient otherwise has been doing pretty good especially staying in touch with her family, reading her books at [place] of them a week and playing with her cat. Her  continues to visit heart least once a week. She reports no safety concern. Shared that only her thumb continues to bother her. She understands it was  her choice to not go for the PCP recommendation for a surgery.  Patient will reflect on the idea of writing about her life. Might find someone to write on her behalf. Her next visit is in a month.      Treatment Objective(s) Addressed in This Session:   identify at 2 strategies to more effectively address stressors. Still is working on acceptance     Intervention:   Grief processing; problem solving ; support    Assessments completed prior to visit:10/10/2020    The following assessments were completed by patient for this visit:  PHQ2:       1/24/2023     9:16 AM 11/22/2022     9:30 AM 8/17/2022     9:37 AM 7/8/2022     9:26 AM 5/11/2022     9:17 AM 5/4/2022     9:38 AM 4/20/2022    10:34 AM   PHQ-2 ( 1999 [employer])   Q1: Little interest or pleasure in doing things 0 0 0 0 0 0 0   Q2: Feeling down, depressed or hopeless 0 0 0 0 0 0 0   PHQ-2 Score 0 0 0 0 0 0 0   Q1: Little interest or pleasure in doing things      Not at all    Q2: Feeling down, depressed or hopeless      Not at all    PHQ-2 Score      0      PHQ9:       8/17/2022     9:37 AM 9/19/2022     9:26 AM 11/22/2022     9:31 AM 1/24/2023     9:16 AM 4/24/2023     9:26 AM 7/6/2023     9:52 AM 8/9/2023     9:34 AM   PHQ-9 SCORE   PHQ-9  Total Score 0 4 0 0 3 0 0     GAD7:       8/17/2022     9:37 AM 9/19/2022     9:26 AM 11/22/2022     9:30 AM 1/24/2023     9:29 AM 4/24/2023     9:26 AM 7/6/2023     9:52 AM 8/9/2023     9:34 AM   CHAU-7 SCORE   Total Score 0 1 0 0 1 0 0     CAGE-AID:       7/8/2022     9:26 AM 8/17/2022     9:37 AM 11/22/2022     9:31 AM 1/24/2023     9:16 AM 4/24/2023     9:26 AM 7/6/2023     9:52 AM 8/9/2023     9:34 AM   CAGE-AID Total Score   Total Score 0 0 0 0 0 0 0     PROMIS 10-Global Health (all questions and answers displayed):       7/8/2022     9:26 AM 8/17/2022     9:37 AM 11/22/2022     9:31 AM 1/24/2023     9:16 AM 4/24/2023     9:26 AM 7/6/2023     9:52 AM 8/9/2023     9:34 AM   PROMIS 10   In general, would you say your health is: 3 3 3 3 3 3 3   In general, would you say your quality of life is: 3 3 3 3 3 3 3   In general, how would you rate your physical health? 3 3 3 3 2 3 3   In general, how would you rate your mental health, including your mood and your ability to think? 3 3 3  3 4 3   In general, how would you rate your satisfaction with your social activities and relationships? 3 3 3 3 3 3 3   In general, please rate how well you carry out your usual social activities and roles. (This includes activities at home, at work and in your community, and responsibilities as a parent, child, spouse, employee, friend, etc.) 3 3 3  2 3 3   To what extent are you able to carry out your everyday physical activities such as walking, climbing stairs, carrying groceries, or moving a chair? 3 3 2 2 2 3 3   In the past 7 days, how often have you been bothered by emotional problems such as feeling anxious, depressed, or irritable? 3 3 2 3 3 2 4   In the past 7 days, how would you rate your fatigue on average? 2 2 2 3 3 2 3   In the past 7 days, how would you rate your pain on average, where 0 means no pain, and 10 means worst imaginable pain? 4 4 3 3 4 1 2   Global Mental Health Score 12 12 13  12 14 11   Global Physical  Health Score 13 13 13 12 10 14 13   PROMIS TOTAL - SUBSCORES 25 25 26  22 28 24     [place] Suicide Severity Rating Scale (Short Version)      7/8/2022     9:26 AM 8/17/2022     9:37 AM 11/22/2022     9:31 AM 1/24/2023     9:18 AM 4/24/2023     9:28 AM 7/6/2023     9:52 AM 8/9/2023     9:33 AM   [place] Suicide Severity Rating (Short Version)   1. Wish to be Dead (Since Last Contact) N N N N N N N   2. Non-Specific Active Suicidal Thoughts (Since Last Contact) N N N N N N N   Actual Attempt (Since Last Contact) N N N N N N N   Has subject engaged in non-suicidal self-injurious behavior? (Since Last Contact) N N N N N N N   Interrupted Attempts (Since Last Contact) N N N N N N N   Aborted or Self-Interrupted Attempt (Since Last Contact) N N N N N N N   Preparatory Acts or Behavior (Since Last Contact) N N N N N N N   Suicide (Since Last Contact) N N N N N N N   Calculated C-SSRS Risk Score (Since Last Contact) No Risk Indicated No Risk Indicated No Risk Indicated No Risk Indicated No Risk Indicated No Risk Indicated No Risk Indicated      ASSESSMENT: Current Emotional / Mental Status (status of significant symptoms):   Risk status (Self / Other harm or suicidal ideation)   Patient denies current fears or concerns for personal safety.   Patient denies current or recent suicidal ideation or behaviors.   Patient denies current or recent homicidal ideation or behaviors.   Patient denies current or recent self injurious behavior or ideation.   Patient denies other safety concerns.   Patient reports there has been no change in risk factors since their last session.     Patient reports there has been no change in protective factors since their last session.     Recommended that patient call 911 or go to the local ED should there be a change in any of these risk factors.     Appearance:                            not seen, not assessed               Eye Contact:                           Good               Psychomotor  Behavior:          not assessed, not seen               Attitude:                                   Cooperative               Orientation:                             Person Place Time Situation              Speech                          Rate / Production:       Normal/ Responsive                          Volume:                       Normal               Mood:                                      Not assessed, not seen              Affect:                                      Appropriate               Thought Content:                    Clear               Thought Form:                        Coherent  Logical               Insight:                                     Good      Medication Review:   No current psychiatric medications prescribed     Medication Compliance:   NA     Allergies   Allergen Reactions    Iodine Unknown      Changes in Health Issues:   None reported     Chemical Use Review:   Substance Use: Chemical use reviewed, no active concerns identified      Tobacco Use: No current tobacco use.      Diagnosis:  1. Recurrent major depressive disorder, in partial remission (H)        Collateral Reports Completed:   Routed note to PCP    PLAN: (Patient Tasks / Therapist Tasks / Other): .   Patient will remain in touch with her family for support.   Patient will will update her family where she is at when she needs to stay longer outside her home  Patient will keep up with her reading to distract her mind  Patient's next appt in a month    JOAQUIM Newell  _________________________________________________________________    Individual Treatment Plan    Patient's Name: Laine Sharma  YOB: 1933    Date of Creation:10/28/2020    Date Treatment Plan Last Reviewed/Revised: 8/09/2023    DSM5 Diagnoses: Adjustment Disorders  309.0 (F43.21) With depressed mood; Grief reaction [F43.21]    Psychosocial / Contextual Factors: Grief: lost  after over 40 years of marriage. Limited mobility  to get doing.     PROMIS (reviewed every 90 days): 26    Referral / Collaboration:  Referral to another professional/service is not indicated at this time..    Anticipated number of session for this episode of care: 12  Anticipation frequency of session: Biweekly; subject to change  Anticipated Duration of each session: 38-52 minutes  Treatment plan will be reviewed in 90 days or when goals have been changed.     MeasurableTreatment Goal(s) related to diagnosis / functional impairment(s)  Goal 1: Patient will talk to her friend or sister daily for suppot    I will know I've met my goal when I don't cry every time I am alone.      Objective #A (Patient Action)    Patient will increase understanding of steps in the grief process.  Status: Continued - Date(s):8/09/2023- keep this goal    Intervention(s)  Therapist will assign homework : go to [employer] with neighbor friend .    Objective #B  Patient will increase understanding of steps in the grief process.  Status: Continued - Date(s): 8/09/2023-keep thisgoal    Intervention(s)  Therapist will teach emotional regulation skills. asisting patient to create, collect memories about  .    Objective #C  Patient will identify 3 strategies to more effectively address stressors.  Status: Continued - Date(s): 8/09/2023    Intervention(s)  Therapist will  Continue to assist patient with looking for open grief suppot near her home.     Patient has reviewed and agreed to the above plan  .    JOAQUIM Newell  8/09/2023

## 2023-09-28 ENCOUNTER — OFFICE VISIT (OUTPATIENT)
Dept: FAMILY MEDICINE | Facility: CLINIC | Age: 88
End: 2023-09-28
Payer: COMMERCIAL

## 2023-09-28 VITALS
HEART RATE: 83 BPM | WEIGHT: 144 LBS | DIASTOLIC BLOOD PRESSURE: 76 MMHG | BODY MASS INDEX: 31.07 KG/M2 | OXYGEN SATURATION: 96 % | HEIGHT: 57 IN | RESPIRATION RATE: 26 BRPM | SYSTOLIC BLOOD PRESSURE: 118 MMHG | TEMPERATURE: 97.8 F

## 2023-09-28 DIAGNOSIS — Z00.00 HEALTHCARE MAINTENANCE: ICD-10-CM

## 2023-09-28 DIAGNOSIS — M18.12 ARTHRITIS OF CARPOMETACARPAL (CMC) JOINT OF LEFT THUMB: Primary | ICD-10-CM

## 2023-09-28 DIAGNOSIS — I10 ESSENTIAL HYPERTENSION: ICD-10-CM

## 2023-09-28 PROBLEM — R55 SYNCOPE: Status: ACTIVE | Noted: 2023-04-19

## 2023-09-28 PROCEDURE — 99213 OFFICE O/P EST LOW 20 MIN: CPT | Mod: 25 | Performed by: FAMILY MEDICINE

## 2023-09-28 PROCEDURE — 91320 SARSCV2 VAC 30MCG TRS-SUC IM: CPT | Performed by: FAMILY MEDICINE

## 2023-09-28 PROCEDURE — 90662 IIV NO PRSV INCREASED AG IM: CPT | Performed by: FAMILY MEDICINE

## 2023-09-28 PROCEDURE — G0008 ADMIN INFLUENZA VIRUS VAC: HCPCS | Performed by: FAMILY MEDICINE

## 2023-09-28 PROCEDURE — 90480 ADMN SARSCOV2 VAC 1/ONLY CMP: CPT | Performed by: FAMILY MEDICINE

## 2023-09-28 RX ORDER — POTASSIUM CHLORIDE 1500 MG/1
20 TABLET, EXTENDED RELEASE ORAL DAILY
COMMUNITY
Start: 2023-04-20

## 2023-09-28 ASSESSMENT — PAIN SCALES - GENERAL: PAINLEVEL: MODERATE PAIN (5)

## 2023-09-28 NOTE — ASSESSMENT & PLAN NOTE
Still wearing wrist splint without thumb extension.  Have tried to get thumb spica splint including risk that allows her to use her IP joint.  Still have not.  I have given up on prescriptions for this, I searched [employer] and showed her the wrist splints with thumb extensions I was discussing and wrote these down for her son to order.

## 2023-09-28 NOTE — PROGRESS NOTES
Assessment/ Plan  Essential hypertension  Blood pressure well controlled today on medications, no change    Arthritis of carpometacarpal (CMC) joint of left thumb  Still wearing wrist splint without thumb extension.  Have tried to get thumb spica splint including risk that allows her to use her IP joint.  Still have not.  I have given up on prescriptions for this, I searched [employer] and showed her the wrist splints with thumb extensions I was discussing and wrote these down for her son to order.    Healthcare maintenance  Flu and COVID   Subjective  CC:  chief complaint  HPI:  Laine continues to have pain in her left thumb/left hand.  She has documented osteoarthritis of her CMC joint.  Also has pain in her MP joint  .  Multiple visits spent trying to get her a wrist-based thumb spica splint.  1 that does not extend beyond the IP joint of the thumb.  She is still wearing her wrist splint that has no protection of her thumb  PFSH:  Patient Active Problem List   Diagnosis    Esophageal reflux    Osteoarthritis    Hyperlipidemia    Essential hypertension    Healthcare maintenance    Nodule of finger of left hand    Grief reaction    Arthritis of carpometacarpal (CMC) joint of left thumb    Anemia, unspecified type    Adjustment disorder with anxious mood    Syncope     acetaminophen (TYLENOL) 325 MG tablet, [ACETAMINOPHEN (TYLENOL) 325 MG TABLET] Take 650 mg by mouth every 6 (six) hours as needed for pain.  amLODIPine (NORVASC) 10 MG tablet, Take 1 tablet by mouth once daily  potassium chloride ER (KLOR-CON M) 20 MEQ CR tablet, Take 20 mEq by mouth daily  valsartan (DIOVAN) 80 MG tablet, Take 1 tablet by mouth once daily  doxycycline hyclate (VIBRAMYCIN) 100 MG capsule, Take 1 capsule (100 mg) by mouth 2 times daily (Patient not taking: Reported on 9/28/2023)  escitalopram (LEXAPRO) 5 MG tablet, Take 1 tablet (5 mg) by mouth daily (Patient not taking: Reported on 5/11/2023)  hydrochlorothiazide (HYDRODIURIL) 25 MG  "tablet, Take 1 tablet by mouth once daily (Patient not taking: Reported on 9/28/2023)    No current facility-administered medications on file prior to visit.       History   Smoking Status    Never   Smokeless Tobacco    Never     Social History     Social History Narrative        Lives in a townhouse alone    2022-still driving locally-all IADLs independent        Julia Guevara lives in town and would be medical decision-maker at this point.  Her son lives in [place]     Patient Care Team:  Abdulkadir Rodriguez MD as PCP - General  Abdulkadir Rodriguez MD as Assigned PCP        Objective  Physical Exam  Vitals:    09/28/23 1053   BP: 118/76   BP Location: Left arm   Patient Position: Sitting   Cuff Size: Adult Regular   Pulse: 83   Resp: 26   Temp: 97.8  F (36.6  C)   SpO2: 96%   Weight: 65.3 kg (144 lb)   Height: 1.456 m (4' 9.32\")     Body mass index is 30.81 kg/m .  Hypertrophic changes of MP joint right thumb, tenderness on CMC  Diagnostics:   None      Please note: Voice recognition software was used in this dictation.  It may therefore contain typographical errors.      "

## 2023-12-12 DIAGNOSIS — I10 ESSENTIAL HYPERTENSION: ICD-10-CM

## 2023-12-12 RX ORDER — HYDROCHLOROTHIAZIDE 25 MG/1
TABLET ORAL
Qty: 60 TABLET | Refills: 0 | Status: SHIPPED | OUTPATIENT
Start: 2023-12-12 | End: 2024-08-05

## 2023-12-12 RX ORDER — VALSARTAN 80 MG/1
TABLET ORAL
Qty: 90 TABLET | Refills: 0 | Status: SHIPPED | OUTPATIENT
Start: 2023-12-12 | End: 2024-07-30

## 2024-07-30 DIAGNOSIS — I10 ESSENTIAL HYPERTENSION: ICD-10-CM

## 2024-07-30 RX ORDER — VALSARTAN 80 MG/1
TABLET ORAL
Qty: 90 TABLET | Refills: 0 | Status: SHIPPED | OUTPATIENT
Start: 2024-07-30

## 2024-11-12 DIAGNOSIS — I10 ESSENTIAL HYPERTENSION: ICD-10-CM

## 2024-11-15 NOTE — TELEPHONE ENCOUNTER
Future Appointments 11/15/2024 - 5/14/2025        Date Visit Type Length Department Provider     12/26/2024  8:30 AM OFFICE VISIT 30 min [facility] Abdulkadir Rodriguez MD    Location Instructions:     [facility] is located at [address] in [place], at the intersection of [address]. This is one block south of the [facility]. Free parking is available in the lot directly north of the clinic across [address]. The clinic is near stops along bus [address].

## 2024-11-19 RX ORDER — VALSARTAN 80 MG/1
TABLET ORAL
Qty: 90 TABLET | Refills: 3 | Status: SHIPPED | OUTPATIENT
Start: 2024-11-19

## 2025-02-25 NOTE — PROGRESS NOTES
"Mental Health Psychotherapy Telephone Note    Patient: Laine Sharma    : 1933 MRN: [id number]    Date: 2020  Start time: 14:05 Stop Time: 14:32   Psychotherapy Session # 2    The patient has been notified of the following:   \"We have found that certain health care needs can be provided without the need for a face to face visit.  This service lets us provide the care you need with a phone conversation.  I will have full access to your [facility] medical record during this entire phone call.   I will be taking notes for your medical record. Since this is like an office visit, we will bill your insurance company for this service.  There are potential benefits and risks of telephone visits (e.g. limits to patient confidentiality) that differ from in-person visits.?  Confidentiality still applies for telephone services, and nobody will record the visit.  It is important to be in a quiet, private space that is free of distractions (including cell phone or other devices) during the visit.?? If during the course of the call I believe a telephone visit is not appropriate, you will not be charged for this service\"  Consent has been obtained for this service by care team member: Yes, per verbal agreement   Session Type: Patient is participating in a telemedicine phone visit: No device for video.     Chief Complaint   Patient presents with      Follow Up     Telephone visit for psychotherapy     New symptoms or complaints: Grief, family illness  PHQ-9: 0   CHAU 7:0    Functional Impairment:   Personal: 3  Family: 3  Work: 0  Social:3        ASSESSMENT: Current Emotional / Mental Status (status of significant symptoms):              Risk status (Self / Other harm or suicidal ideation)              Patient denied personal safety              Patient denies current or recent suicidal ideation or behaviors.              Patient denies current or recent homicidal ideation or behaviors.              Patient denies current or " recent self injurious behavior or ideation.              Patient denies other safety concerns.              Patient denied any change in risk factors   Patient  denied any change in the protective factors              Recommended that patient call 911 or go to the local ED should there be a change in any of these risk factors.                Attitude:  Cooperative               Orientation:    X3              Speech                          Rate / Production:       Normal/ Responsive                          Volume:                       Normal               Mood:                                      Normal              Thought Content:                    Clear               Thought Form:                        Coherent  Logical               Insight:                                     Good     Patient's impression of their current status: Patient verified her name and  at he beggining  of this visit. Patient reported that the emotional reactions associated with with the loss of her  have been under control.  Reported no feelings of depression or anxiety today . Currently, she is visiting her brother who lives out of the state.  He is in recovery from COVID-19 which gives her much more strength and hope.  Patient also expressed sadness about the possibility of losing another family member, nephew's wife due to severe medical issues.  She is visiting this family before returning to [place].  Patient will cancel her next appointment visit as that she is still visiting her family.    Therapist impression of patients current state: Patient was a supported as that she began to express his feelings more freely as rapport and trust levels increased.  Writer assisted her safety and the level of anxiety and depression using PHQ 9 and CHAU 7.  Offered empathy and validated her feelings of sadness related to her family illnesses and another possible loss. Patient is doing pretty well processing her grief with her  family.  Writer and patient reviewed her schedule to be able to spend some time with her family. Will resume her session in 3 weeks.     Type of psychotherapeutic technique provided: Insight oriented, Client centered, Solution-focused, CBT and Grief processing    Progress toward short term goals: Progress as expected, reports she is emotionally in control of her feelings arund grief and ongoing family issues.      Review of long term goals: Initial treatment plan: 10/28/2020 next review: 1/28/2021     Diagnosis:  1. Grief reaction    2. Adjustment disorder with anxiety      Plan and Follow up:   Patient will spend some quality time with her family in [place]  Patient will resume health sessions on November 189; her schedule is being adjusted    Discharge Criteria/Planning: Patient will continue with follow-up until therapy can be discontinued without return of signs and symptoms.    I have reviewed the note as documented above.  This accurately captures the substance of my conversation with the patient.  As the provider I attest to compliance with applicable laws and regulations related to telemedicine.  Performed and documented by CHASITY Newell- JOAQUIM; [facility] 11/4/2020   24-Feb-2025 21:51